# Patient Record
Sex: FEMALE | Race: AMERICAN INDIAN OR ALASKA NATIVE | NOT HISPANIC OR LATINO | URBAN - METROPOLITAN AREA
[De-identification: names, ages, dates, MRNs, and addresses within clinical notes are randomized per-mention and may not be internally consistent; named-entity substitution may affect disease eponyms.]

---

## 2018-10-17 ENCOUNTER — OUTPATIENT (OUTPATIENT)
Dept: OUTPATIENT SERVICES | Facility: HOSPITAL | Age: 63
LOS: 1 days | Discharge: HOME | End: 2018-10-17

## 2018-10-17 DIAGNOSIS — M25.521 PAIN IN RIGHT ELBOW: ICD-10-CM

## 2018-10-17 DIAGNOSIS — M25.562 PAIN IN LEFT KNEE: ICD-10-CM

## 2018-10-17 DIAGNOSIS — M25.561 PAIN IN RIGHT KNEE: ICD-10-CM

## 2018-10-17 DIAGNOSIS — M25.522 PAIN IN LEFT ELBOW: ICD-10-CM

## 2018-10-19 ENCOUNTER — TRANSCRIPTION ENCOUNTER (OUTPATIENT)
Age: 63
End: 2018-10-19

## 2019-06-27 ENCOUNTER — OUTPATIENT (OUTPATIENT)
Dept: OUTPATIENT SERVICES | Facility: HOSPITAL | Age: 64
LOS: 1 days | Discharge: HOME | End: 2019-06-27

## 2019-06-27 ENCOUNTER — APPOINTMENT (OUTPATIENT)
Age: 64
End: 2019-06-27
Payer: MEDICARE

## 2019-06-27 VITALS
BODY MASS INDEX: 35.08 KG/M2 | HEIGHT: 63 IN | WEIGHT: 198 LBS | HEART RATE: 88 BPM | DIASTOLIC BLOOD PRESSURE: 89 MMHG | SYSTOLIC BLOOD PRESSURE: 149 MMHG

## 2019-06-27 DIAGNOSIS — Z78.9 OTHER SPECIFIED HEALTH STATUS: ICD-10-CM

## 2019-06-27 DIAGNOSIS — Z86.79 PERSONAL HISTORY OF OTHER DISEASES OF THE CIRCULATORY SYSTEM: ICD-10-CM

## 2019-06-27 DIAGNOSIS — Z82.49 FAMILY HISTORY OF ISCHEMIC HEART DISEASE AND OTHER DISEASES OF THE CIRCULATORY SYSTEM: ICD-10-CM

## 2019-06-27 DIAGNOSIS — Z80.9 FAMILY HISTORY OF MALIGNANT NEOPLASM, UNSPECIFIED: ICD-10-CM

## 2019-06-27 DIAGNOSIS — Z85.850 PERSONAL HISTORY OF MALIGNANT NEOPLASM OF THYROID: ICD-10-CM

## 2019-06-27 DIAGNOSIS — M19.90 UNSPECIFIED OSTEOARTHRITIS, UNSPECIFIED SITE: ICD-10-CM

## 2019-06-27 DIAGNOSIS — Z87.09 PERSONAL HISTORY OF OTHER DISEASES OF THE RESPIRATORY SYSTEM: ICD-10-CM

## 2019-06-27 LAB
BILIRUB UR QL STRIP: NORMAL
CLARITY UR: CLEAR
COLLECTION METHOD: NORMAL
GLUCOSE UR-MCNC: NORMAL
HCG UR QL: NORMAL EU/DL
HGB UR QL STRIP.AUTO: NORMAL
KETONES UR-MCNC: NORMAL
LEUKOCYTE ESTERASE UR QL STRIP: 25
NITRITE UR QL STRIP: NORMAL
PH UR STRIP: 7
PROT UR STRIP-MCNC: NORMAL
SP GR UR STRIP: 1

## 2019-06-27 PROCEDURE — 81003 URINALYSIS AUTO W/O SCOPE: CPT | Mod: QW

## 2019-06-27 PROCEDURE — 99205 OFFICE O/P NEW HI 60 MIN: CPT

## 2019-06-27 PROCEDURE — 51701 INSERT BLADDER CATHETER: CPT

## 2019-06-27 RX ORDER — ASPIRIN ENTERIC COATED TABLETS 81 MG 81 MG/1
81 TABLET, DELAYED RELEASE ORAL
Refills: 0 | Status: ACTIVE | COMMUNITY

## 2019-06-27 RX ORDER — LEVOTHYROXINE SODIUM 137 UG/1
137 TABLET ORAL
Refills: 0 | Status: ACTIVE | COMMUNITY

## 2019-06-27 RX ORDER — ESTRADIOL 0.1 MG/G
0.1 CREAM VAGINAL
Qty: 1 | Refills: 3 | Status: ACTIVE | COMMUNITY
Start: 2019-06-27 | End: 1900-01-01

## 2019-06-27 RX ORDER — ALLOPURINOL 100 MG/1
100 TABLET ORAL
Refills: 0 | Status: ACTIVE | COMMUNITY

## 2019-06-27 RX ORDER — LOSARTAN POTASSIUM 50 MG/1
50 TABLET, FILM COATED ORAL
Refills: 0 | Status: ACTIVE | COMMUNITY

## 2019-06-27 NOTE — COUNSELING
[FreeTextEntry1] : \par We will notify you of the urine results if they are abnormal\par \par Please call the office if you feel like you have an infection so that we can arrange testing of your urine with a urine CULTURE. If you keep getting infections then I will recommend further evaluation of your kidneys and bladder\par \par Please decrease your water intake to MAX 4-5 bottles of water per day\par \par Decrease bladder irritants\par \par Apply a pea size amount of the cream to the opening of the vagina every night for two weeks followed by three nights per week\par \par Please start taking the trospium (bladder medication) twice a day for the urgency and incontinence\par \par Please call my office if you have any issues with the cost or side effects of the medication.\par \par Schedule 6 week med check with my PAJeannette (GERARDO/atrophy)\par \par Referral to pelvic floor PT. We will add you to the list for scheduling PT here at [facility]\par \par \par

## 2019-06-27 NOTE — DISCUSSION/SUMMARY
[FreeTextEntry1] : \par Mixed incontinence-\par The pathophysiology of the above condition was discussed with the patient. The patient was given information and education on pelvic floor muscle exercises/rehabilitation, avoidance of dietary bladder irritants, and other strategies to improve bladder control, such as scheduled voiding. She was counseled regarding further management strategies for overactive bladder and urge incontinence including pelvic floor physical therapy, medications or surgical management. The patient voiced understanding and agrees with diet changes, referral to PT and medical management. The risks and benefits of trospium was reviewed. We will follow up on her urine tests.\par \par The management options for stress incontinence were discussed including observation, pessary placement, pelvic floor physical therapy or surgery. The patient voiced understanding and agrees with a referral to PT.\par \par History of UTI-\par Advised the patient that recurrent UTIs are defined as having 3 or more positive urine culture in 1 year or 2 or more in 6 months, which she has not had. Advised to call the office if she feels like she has an infection so that we can arrange testing of her urine. If she keeps getting infections then I will recommend a workup of further evaluation of her kidneys and bladder and further prevention treatment including estrogen vaginal cream and daily antibiotic suppression. The patient voiced understanding and agrees with the plan.\par \par Atrophic vaginitis-\par We reviewed the risks, benefits, alternatives and indications of local estrogen therapy and I gave her a handout that covers this information. She does opt to begin this therapy. I have given her a prescription and specific instructions on how to use the estrogen cream, applied with a finger at a low dose for urogenital atrophy.\par \par \par

## 2019-06-27 NOTE — HISTORY OF PRESENT ILLNESS
[FreeTextEntry1] : \par Pt with pelvic floor dysfunction here for urogynecologic evaluation. She describes: \par Referring provider: Dr FRANCESCA Parra\par PCP: Dr Naveen Bagley\par \par Chief PFD: urinary incontinence\par \par RN\par \par UTI symptoms-not always related to sex\par hesitancy, dysuria, back pain, no hematuria\par takes azo and it mostly improves, if it does not improve her pcp empirically treats without sending a culture. last Abx treatment two weeks ago, around 4 treatments of abx in the last 12 months\par \par Pelvic organ prolapse: s/p tubal, s/p QUEENIE/US0 for fibroids, no bulge, denies splinting \par Stress urinary incontinence: Leakage with coughing, GERARDO: U>S\par Overactive bladder syndrome: voids [id number] secondary to urgency and incontinence, urge incontinence a couple of times per week, daily eneuresis, for one year, worsening, no prior treatment, no glaucoma, reports drinking 10-12 bottles of water per day (each 16 ounces)\par Voiding dysfunction: No incomplete bladder emptying, no hesitancy, no splinting \par Lower urinary tract/vaginal symptoms: as above UTIs per year, no hematuria, occasional dysuria relieved with azo, no bladder pain \par Fecal incontinence: denies \par Defecatory dysfunction: soft\par Sexual dysfunction: denies pain, has leakage with orgasm\par Pelvic pain: intermittent left sided, cramping & dull, typically 7/0 intensity, no pain currently, no aggravating factors, radiates to lower back, improves with resting, for around 1.5 years\par Vaginal dryness, uses Replens intermittently\par \par Her pelvic floor symptoms are significantly bothersome and negatively impacting her quality of life. \par \par

## 2019-06-28 DIAGNOSIS — N39.46 MIXED INCONTINENCE: ICD-10-CM

## 2019-07-01 LAB
APPEARANCE: CLEAR
BACTERIA UR CULT: NORMAL
BILIRUBIN URINE: NEGATIVE
BLOOD URINE: NEGATIVE
COLOR: YELLOW
GLUCOSE QUALITATIVE U: NEGATIVE MG/DL
KETONES URINE: NEGATIVE
LEUKOCYTE ESTERASE URINE: NEGATIVE
NITRITE URINE: NEGATIVE
PH URINE: 7
PROTEIN URINE: NEGATIVE MG/DL
SPECIFIC GRAVITY URINE: <=1.005
UROBILINOGEN URINE: 0.2 MG/DL (ref 0.2–?)

## 2019-08-12 ENCOUNTER — APPOINTMENT (OUTPATIENT)
Dept: UROGYNECOLOGY | Facility: CLINIC | Age: 64
End: 2019-08-12

## 2019-09-06 ENCOUNTER — RX RENEWAL (OUTPATIENT)
Age: 64
End: 2019-09-06

## 2019-09-06 RX ORDER — TROSPIUM CHLORIDE 20 MG/1
20 TABLET, FILM COATED ORAL
Qty: 60 | Refills: 1 | Status: DISCONTINUED | COMMUNITY
Start: 2019-06-27 | End: 2019-09-06

## 2019-09-12 RX ORDER — NITROFURANTOIN (MONOHYDRATE/MACROCRYSTALS) 25; 75 MG/1; MG/1
100 CAPSULE ORAL TWICE DAILY
Qty: 14 | Refills: 0 | Status: COMPLETED | COMMUNITY
Start: 2019-09-12 | End: 2019-09-19

## 2020-01-01 ENCOUNTER — APPOINTMENT (OUTPATIENT)
Dept: GASTROENTEROLOGY | Facility: CLINIC | Age: 65
End: 2020-01-01
Payer: COMMERCIAL

## 2020-01-01 ENCOUNTER — OUTPATIENT (OUTPATIENT)
Dept: OUTPATIENT SERVICES | Facility: HOSPITAL | Age: 65
LOS: 1 days | Discharge: HOME | End: 2020-01-01
Payer: COMMERCIAL

## 2020-01-01 VITALS — WEIGHT: 194 LBS | BODY MASS INDEX: 34.38 KG/M2 | TEMPERATURE: 98 F | HEIGHT: 63 IN

## 2020-01-01 DIAGNOSIS — Z87.09 PERSONAL HISTORY OF OTHER DISEASES OF THE RESPIRATORY SYSTEM: ICD-10-CM

## 2020-01-01 DIAGNOSIS — R07.9 CHEST PAIN, UNSPECIFIED: ICD-10-CM

## 2020-01-01 DIAGNOSIS — R73.03 PREDIABETES.: ICD-10-CM

## 2020-01-01 DIAGNOSIS — Z85.850 PERSONAL HISTORY OF MALIGNANT NEOPLASM OF THYROID: ICD-10-CM

## 2020-01-01 PROCEDURE — 75574 CT ANGIO HRT W/3D IMAGE: CPT | Mod: 26

## 2020-01-01 PROCEDURE — 99203 OFFICE O/P NEW LOW 30 MIN: CPT

## 2020-01-01 RX ORDER — ASCORBIC ACID 500 MG
500 TABLET ORAL
Refills: 0 | Status: ACTIVE | COMMUNITY

## 2020-01-01 RX ORDER — MONTELUKAST SODIUM 10 MG/1
10 TABLET, FILM COATED ORAL
Refills: 0 | Status: ACTIVE | COMMUNITY

## 2020-01-01 RX ORDER — PANTOPRAZOLE SODIUM 20 MG/1
20 TABLET, DELAYED RELEASE ORAL
Refills: 0 | Status: ACTIVE | COMMUNITY

## 2020-01-01 RX ORDER — AMLODIPINE BESYLATE 5 MG/1
5 TABLET ORAL
Refills: 0 | Status: ACTIVE | COMMUNITY

## 2020-08-21 ENCOUNTER — TRANSCRIPTION ENCOUNTER (OUTPATIENT)
Age: 65
End: 2020-08-21

## 2020-08-21 ENCOUNTER — EMERGENCY (EMERGENCY)
Facility: HOSPITAL | Age: 65
LOS: 0 days | Discharge: HOME | End: 2020-08-21
Attending: STUDENT IN AN ORGANIZED HEALTH CARE EDUCATION/TRAINING PROGRAM | Admitting: INTERNAL MEDICINE
Payer: COMMERCIAL

## 2020-08-21 VITALS
DIASTOLIC BLOOD PRESSURE: 73 MMHG | SYSTOLIC BLOOD PRESSURE: 156 MMHG | HEART RATE: 100 BPM | RESPIRATION RATE: 18 BRPM | TEMPERATURE: 98 F | OXYGEN SATURATION: 99 %

## 2020-08-21 VITALS
RESPIRATION RATE: 18 BRPM | HEART RATE: 78 BPM | DIASTOLIC BLOOD PRESSURE: 77 MMHG | TEMPERATURE: 97 F | OXYGEN SATURATION: 99 % | SYSTOLIC BLOOD PRESSURE: 148 MMHG

## 2020-08-21 DIAGNOSIS — Z90.09 ACQUIRED ABSENCE OF OTHER PART OF HEAD AND NECK: ICD-10-CM

## 2020-08-21 DIAGNOSIS — J45.909 UNSPECIFIED ASTHMA, UNCOMPLICATED: ICD-10-CM

## 2020-08-21 DIAGNOSIS — I10 ESSENTIAL (PRIMARY) HYPERTENSION: ICD-10-CM

## 2020-08-21 DIAGNOSIS — E11.9 TYPE 2 DIABETES MELLITUS WITHOUT COMPLICATIONS: ICD-10-CM

## 2020-08-21 DIAGNOSIS — N39.0 URINARY TRACT INFECTION, SITE NOT SPECIFIED: ICD-10-CM

## 2020-08-21 DIAGNOSIS — Z90.710 ACQUIRED ABSENCE OF BOTH CERVIX AND UTERUS: ICD-10-CM

## 2020-08-21 DIAGNOSIS — R10.31 RIGHT LOWER QUADRANT PAIN: ICD-10-CM

## 2020-08-21 DIAGNOSIS — B37.9 CANDIDIASIS, UNSPECIFIED: ICD-10-CM

## 2020-08-21 LAB
ALBUMIN SERPL ELPH-MCNC: 4.5 G/DL — SIGNIFICANT CHANGE UP (ref 3.5–5.2)
ALP SERPL-CCNC: 76 U/L — SIGNIFICANT CHANGE UP (ref 30–115)
ALT FLD-CCNC: 21 U/L — SIGNIFICANT CHANGE UP (ref 0–41)
ANION GAP SERPL CALC-SCNC: 11 MMOL/L — SIGNIFICANT CHANGE UP (ref 7–14)
APPEARANCE UR: ABNORMAL
AST SERPL-CCNC: 28 U/L — SIGNIFICANT CHANGE UP (ref 0–41)
BACTERIA # UR AUTO: NEGATIVE — SIGNIFICANT CHANGE UP
BASOPHILS # BLD AUTO: 0.04 K/UL — SIGNIFICANT CHANGE UP (ref 0–0.2)
BASOPHILS NFR BLD AUTO: 0.6 % — SIGNIFICANT CHANGE UP (ref 0–1)
BILIRUB DIRECT SERPL-MCNC: <0.2 MG/DL — SIGNIFICANT CHANGE UP (ref 0–0.2)
BILIRUB INDIRECT FLD-MCNC: >0.3 MG/DL — SIGNIFICANT CHANGE UP (ref 0.2–1.2)
BILIRUB SERPL-MCNC: 0.5 MG/DL — SIGNIFICANT CHANGE UP (ref 0.2–1.2)
BILIRUB UR-MCNC: NEGATIVE — SIGNIFICANT CHANGE UP
BUN SERPL-MCNC: 10 MG/DL — SIGNIFICANT CHANGE UP (ref 10–20)
CALCIUM SERPL-MCNC: 9.2 MG/DL — SIGNIFICANT CHANGE UP (ref 8.5–10.1)
CHLORIDE SERPL-SCNC: 105 MMOL/L — SIGNIFICANT CHANGE UP (ref 98–110)
CO2 SERPL-SCNC: 23 MMOL/L — SIGNIFICANT CHANGE UP (ref 17–32)
COLOR SPEC: YELLOW — SIGNIFICANT CHANGE UP
CREAT SERPL-MCNC: 0.9 MG/DL — SIGNIFICANT CHANGE UP (ref 0.7–1.5)
DIFF PNL FLD: SIGNIFICANT CHANGE UP
EOSINOPHIL # BLD AUTO: 0.08 K/UL — SIGNIFICANT CHANGE UP (ref 0–0.7)
EOSINOPHIL NFR BLD AUTO: 1.2 % — SIGNIFICANT CHANGE UP (ref 0–8)
EPI CELLS # UR: >27 /HPF — HIGH (ref 0–5)
GLUCOSE SERPL-MCNC: 102 MG/DL — HIGH (ref 70–99)
GLUCOSE UR QL: NEGATIVE — SIGNIFICANT CHANGE UP
HCT VFR BLD CALC: 37.2 % — SIGNIFICANT CHANGE UP (ref 37–47)
HGB BLD-MCNC: 12 G/DL — SIGNIFICANT CHANGE UP (ref 12–16)
HYALINE CASTS # UR AUTO: 2 /LPF — SIGNIFICANT CHANGE UP (ref 0–7)
IMM GRANULOCYTES NFR BLD AUTO: 0.3 % — SIGNIFICANT CHANGE UP (ref 0.1–0.3)
KETONES UR-MCNC: NEGATIVE — SIGNIFICANT CHANGE UP
LACTATE SERPL-SCNC: 1.9 MMOL/L — SIGNIFICANT CHANGE UP (ref 0.7–2)
LEUKOCYTE ESTERASE UR-ACNC: ABNORMAL
LIDOCAIN IGE QN: 23 U/L — SIGNIFICANT CHANGE UP (ref 7–60)
LYMPHOCYTES # BLD AUTO: 1.93 K/UL — SIGNIFICANT CHANGE UP (ref 1.2–3.4)
LYMPHOCYTES # BLD AUTO: 29 % — SIGNIFICANT CHANGE UP (ref 20.5–51.1)
MCHC RBC-ENTMCNC: 30.4 PG — SIGNIFICANT CHANGE UP (ref 27–31)
MCHC RBC-ENTMCNC: 32.3 G/DL — SIGNIFICANT CHANGE UP (ref 32–37)
MCV RBC AUTO: 94.2 FL — SIGNIFICANT CHANGE UP (ref 81–99)
MONOCYTES # BLD AUTO: 0.63 K/UL — HIGH (ref 0.1–0.6)
MONOCYTES NFR BLD AUTO: 9.5 % — HIGH (ref 1.7–9.3)
NEUTROPHILS # BLD AUTO: 3.95 K/UL — SIGNIFICANT CHANGE UP (ref 1.4–6.5)
NEUTROPHILS NFR BLD AUTO: 59.4 % — SIGNIFICANT CHANGE UP (ref 42.2–75.2)
NITRITE UR-MCNC: NEGATIVE — SIGNIFICANT CHANGE UP
NRBC # BLD: 0 /100 WBCS — SIGNIFICANT CHANGE UP (ref 0–0)
PH UR: 7 — SIGNIFICANT CHANGE UP (ref 5–8)
PLATELET # BLD AUTO: 587 K/UL — HIGH (ref 130–400)
POTASSIUM SERPL-MCNC: 4.9 MMOL/L — SIGNIFICANT CHANGE UP (ref 3.5–5)
POTASSIUM SERPL-SCNC: 4.9 MMOL/L — SIGNIFICANT CHANGE UP (ref 3.5–5)
PROT SERPL-MCNC: 7.3 G/DL — SIGNIFICANT CHANGE UP (ref 6–8)
PROT UR-MCNC: SIGNIFICANT CHANGE UP
RBC # BLD: 3.95 M/UL — LOW (ref 4.2–5.4)
RBC # FLD: 13.2 % — SIGNIFICANT CHANGE UP (ref 11.5–14.5)
RBC CASTS # UR COMP ASSIST: 2 /HPF — SIGNIFICANT CHANGE UP (ref 0–4)
SODIUM SERPL-SCNC: 139 MMOL/L — SIGNIFICANT CHANGE UP (ref 135–146)
SP GR SPEC: 1.01 — SIGNIFICANT CHANGE UP (ref 1.01–1.02)
UROBILINOGEN FLD QL: SIGNIFICANT CHANGE UP
WBC # BLD: 6.65 K/UL — SIGNIFICANT CHANGE UP (ref 4.8–10.8)
WBC # FLD AUTO: 6.65 K/UL — SIGNIFICANT CHANGE UP (ref 4.8–10.8)
WBC UR QL: 14 /HPF — HIGH (ref 0–5)

## 2020-08-21 PROCEDURE — 99285 EMERGENCY DEPT VISIT HI MDM: CPT

## 2020-08-21 PROCEDURE — 74177 CT ABD & PELVIS W/CONTRAST: CPT | Mod: 26

## 2020-08-21 RX ORDER — CEFPODOXIME PROXETIL 100 MG
1 TABLET ORAL
Qty: 20 | Refills: 0
Start: 2020-08-21 | End: 2020-01-01

## 2020-08-21 RX ORDER — ONDANSETRON 8 MG/1
4 TABLET, FILM COATED ORAL ONCE
Refills: 0 | Status: COMPLETED | OUTPATIENT
Start: 2020-08-21 | End: 2020-08-21

## 2020-08-21 RX ORDER — SODIUM CHLORIDE 9 MG/ML
1000 INJECTION, SOLUTION INTRAVENOUS ONCE
Refills: 0 | Status: COMPLETED | OUTPATIENT
Start: 2020-08-21 | End: 2020-08-21

## 2020-08-21 RX ORDER — FLUCONAZOLE 150 MG/1
150 TABLET ORAL ONCE
Refills: 0 | Status: COMPLETED | OUTPATIENT
Start: 2020-08-21 | End: 2020-08-21

## 2020-08-21 RX ORDER — ACETAMINOPHEN 500 MG
650 TABLET ORAL ONCE
Refills: 0 | Status: COMPLETED | OUTPATIENT
Start: 2020-08-21 | End: 2020-08-21

## 2020-08-21 RX ADMIN — FLUCONAZOLE 150 MILLIGRAM(S): 150 TABLET ORAL at 14:05

## 2020-08-21 RX ADMIN — SODIUM CHLORIDE 1000 MILLILITER(S): 9 INJECTION, SOLUTION INTRAVENOUS at 12:35

## 2020-08-21 RX ADMIN — Medication 650 MILLIGRAM(S): at 12:34

## 2020-08-21 RX ADMIN — ONDANSETRON 4 MILLIGRAM(S): 8 TABLET, FILM COATED ORAL at 12:35

## 2020-08-21 NOTE — ED PROVIDER NOTE - PHYSICAL EXAMINATION
CONSTITUTIONAL: Well-developed; well-nourished; in no acute distress.   SKIN: warm, dry  HEAD: Normocephalic; atraumatic.  EYES: no conjunctival injection.  EOMI.   ENT: No nasal discharge; airway clear.  NECK: Supple; non tender.  CARD: S1, S2 normal; no murmurs, gallops, or rubs. Regular rate and rhythm.   RESP: No wheezes, rales or rhonchi.  ABD: soft nondistended. rlq ttp. +suprapubic TTP. no CVA tenderness.   EXT: Normal ROM.  No clubbing, cyanosis or edema.   LYMPH: No acute cervical adenopathy.  NEURO: Alert, oriented, grossly unremarkable.  PSYCH: Cooperative, appropriate.

## 2020-08-21 NOTE — ED PROVIDER NOTE - NSFOLLOWUPINSTRUCTIONS_ED_ALL_ED_FT
Urinary Tract Infection    A urinary tract infection (UTI) is an infection of any part of the urinary tract, which includes the kidneys, ureters, bladder, and urethra. Risk factors include ignoring your need to urinate, wiping back to front if female, being an uncircumcised male, and having diabetes or a weak immune system. Symptoms include frequent urination, pain or burning with urination, foul smelling urine, cloudy urine, pain in the lower abdomen, blood in the urine, and fever. If you were prescribed an antibiotic medicine, take it as told by your health care provider. Do not stop taking the antibiotic even if you start to feel better.    SEEK IMMEDIATE MEDICAL CARE IF YOU HAVE THE FOLLOWING SYMPTOMS: severe back or abdominal pain, inability to keep fluids or medicine down, dizziness/lightheadedness, or a change in mental status.      Vaginal Yeast infection, Adult    Vaginal yeast infection is a condition that causes soreness, swelling, and redness (inflammation) of the vagina. It also causes vaginal discharge. This is a common condition. Some women get this infection frequently.     CAUSES  This condition is caused by a change in the normal balance of the yeast (candida) and bacteria that live in the vagina. This change causes an overgrowth of yeast, which causes the inflammation.    RISK FACTORS  This condition is more likely to develop in:    Women who take antibiotic medicines.  Women who have diabetes.  Women who take birth control pills.  Women who are pregnant.  Women who douche often.  Women who have a weak defense (immune) system.  Women who have been taking steroid medicines for a long time.  Women who frequently wear tight clothing.    SYMPTOMS  Symptoms of this condition include:    White, thick vaginal discharge.  Swelling, itching, redness, and irritation of the vagina. The lips of the vagina (vulva) may be affected as well.  Pain or a burning feeling while urinating.  Pain during sex.    DIAGNOSIS  This condition is diagnosed with a medical history and physical exam. This will include a pelvic exam. Your health care provider will examine a sample of your vaginal discharge under a microscope. Your health care provider may send this sample for testing to confirm the diagnosis.    TREATMENT  This condition is treated with medicine. Medicines may be over-the-counter or prescription. You may be told to use one or more of the following:    Medicine that is taken orally.  Medicine that is applied as a cream.  Medicine that is inserted directly into the vagina (suppository).    HOME CARE INSTRUCTIONS  Take or apply over-the-counter and prescription medicines only as told by your health care provider.  Do not have sex until your health care provider has approved. Tell your sex partner that you have a yeast infection. That person should go to his or her health care provider if he or she develops symptoms.  Do not wear tight clothes, such as pantyhose or tight pants.  Avoid using tampons until your health care provider approves.  Eat more yogurt. This may help to keep your yeast infection from returning.  Try taking a sitz bath to help with discomfort. This is a warm water bath that is taken while you are sitting down. The water should only come up to your hips and should cover your buttocks. Do this 3–4 times per day or as told by your health care provider.  Do not douche.  Wear breathable, cotton underwear.  If you have diabetes, keep your blood sugar levels under control.    SEEK MEDICAL CARE IF:  You have a fever.  Your symptoms go away and then return.  Your symptoms do not get better with treatment.  Your symptoms get worse.  You have new symptoms.  You develop blisters in or around your vagina.  You have blood coming from your vagina and it is not your menstrual period.  You develop pain in your abdomen.    ADDITIONAL NOTES AND INSTRUCTIONS    Please follow up with your Primary MD in 24-48 hr.  Seek immediate medical care for any new/worsening signs or symptoms.

## 2020-08-21 NOTE — ED PROVIDER NOTE - CARE PROVIDER_API CALL
Zurdo Caceres  GASTROENTEROLOGY  [address] [place] [address]  Phone: [phone]  Fax: [phone]  Follow Up Time:     Naveen Bagley  INTERNAL MEDICINE  [address] [place] [address]  Phone: [phone]  Fax: [phone]  Follow Up Time:

## 2020-08-21 NOTE — ED ADULT NURSE NOTE - OBJECTIVE STATEMENT
Patient presents to ED with c/o RLQ abdominal pain and nausea for 3 days, worsening today. Patient denies vomiting, denies diarrhea.

## 2020-08-21 NOTE — ED PROVIDER NOTE - ATTENDING CONTRIBUTION TO CARE
65 yr old f w/ a pmh significant for  HTN, asthma, DM, thyroid CA s/p thyroidectomy, partial hysterectomy presents to ED with RLQ pain x3 days radiating to her back. Pt denies any nausea, vomiting, chest pain, fevers, chills back pain or any other complaints. Of note, pt does complain of vaginal itchiness.     Review of Systems    Constitutional: (-) fever  Cardiovascular: (-) chest pain, (-) syncope  Respiratory: (-) cough, (-) shortness of breath  Gastrointestinal: (-) vomiting, (-) diarrhea, (+) abdominal pain  Musculoskeletal: (-) neck pain, (-) back pain, (-) joint pain  Integumentary: (-) rash, (-) edema  Neurological: (-) headache, (-) altered mental status    Except as documented in the HPI, all other systems are negative.    VITAL SIGNS: I have reviewed nursing notes and confirm.  CONSTITUTIONAL: non-toxic, well appearing  SKIN: no rash, no petechiae.  EYES: PERRL, EOMI, pink conjunctiva, anicteric  ENT: tongue midline, no exudates, MMM  NECK: Supple; no meningismus, no JVD  CARD: RRR, no murmurs, equal radial pulses bilaterally 2+  RESP: CTAB, no respiratory distress  ABD: Soft, non-tender, non-distended, no peritoneal signs, no HSM, no CVA tenderness  : GYN Exam:      Ext vagina: normal. No erythema, swelling, or lesions.      Speculum exam: Vaginal wall normal in appearance without erythema, masses, or lesions. Cervix normal in appearance without erythema or masses. The os is closed. There is scant discharge. The discharge is thick and white.      Bimanual: No CMT, right adnexal tenderness, or left adnexal tenderness.      Chaperoned by: Dr. Rizvi    a/p  65 yr old f that presents with lower abdominal pain, concerning for UTI vs appy  -on exam, pt has evidence of yeast infection, will treat with fluconazole  -labs  -ct a/p  -ua/culture  -dispo as per above

## 2020-08-21 NOTE — ED PROVIDER NOTE - OBJECTIVE STATEMENT
64 yo F 66 yo F PMHx 66 yo F PMHx HTN, asthma, DM, thyroid CA s/p thyroidectomy, partial hysterectomy presents to ED with RLQ pain x3 days radiating to her back. Denies vomiting, fevers, diarrhea. Pain has been gradual and worsening. Denies vaginal bleeding, dysuria but reports urinary frequency.

## 2020-08-21 NOTE — ED PROVIDER NOTE - PATIENT PORTAL LINK FT
You can access the FollowMyHealth Patient Portal offered by [facility] by registering at the following website: [email or web address]. By joining [employer]’s FollowMyHealth portal, you will also be able to view your health information using other applications (apps) compatible with our system.

## 2020-08-21 NOTE — ED PROVIDER NOTE - PROGRESS NOTE DETAILS
GONZÁLEZ Rizvi: Pelvic exam performed. Normal appearing female genitalia, no masses/lesions/erythema. +thick white discharge consistent with yeast infection. Os closed. No active bleeding. No CMT or adnexal tenderness. David: pt signed out to Dr. Knapp SR: s/o received from dr. ivy will f.u ct and reassess

## 2020-08-21 NOTE — ED PROVIDER NOTE - CARE PLAN
Principal Discharge DX:	Abdominal pain  Secondary Diagnosis:	UTI (urinary tract infection)  Secondary Diagnosis:	Yeast infection

## 2020-08-21 NOTE — ED PROVIDER NOTE - NS ED ROS FT
Review of Systems:  CONSTITUTIONAL: No fever, No diaphoresis, No weight change  SKIN: No rash  HEMATOLOGIC: No abnormal bleeding or bruising  EYES: No eye pain, No blurred vision  ENT: No change in hearing, No sore throat, No neck pain, No rhinorrhea, No ear pain  RESPIRATORY: No shortness of breath, No cough  CARDIAC: No chest pain, No palpitations  GI: +abdominal pain, No nausea, No vomiting, No diarrhea, No constipation, No bright red blood per rectum or melena. No flank pain.   : No dysuria, hematuria. +frequency   MUSCULOSKELETAL: No joint paint, No swelling, No back pain  NEUROLOGIC: No numbness, No focal weakness, No headache, No dizziness  All other systems negative, unless specified in HPI

## 2020-08-21 NOTE — ED PROVIDER NOTE - CLINICAL SUMMARY MEDICAL DECISION MAKING FREE TEXT BOX
65 year old f w/ a pmh significant for  HTN, asthma, DM, thyroid CA s/p thyroidectomy, partial hysterectomy presents to ED with RLQ pain x3 days . VS reviewed. Labs imaging obtained and reviewed. Patient found to have findings consistent with yeast infection on pelvic exam and treated with fluconazole. Urinalysis positive for UTI , antibiotics sent to pharmacy. CT demonstrated "Ill-defined 1.5 cm focus of hypodensity in the left hepatic lobe is indeterminate. Another ill-defined a 3.8 cm region of hyperdensity adjacent to the gallbladder in the left hepatic lobe. Gadolinium-enhanced MRI of the liver is recommended for further evaluation as an outpatient." Discussed with patient to follow up with PMD for further evaluation for MRI. Patient a spoken to in detail about results  All questions addressed.  Results of ED work up discussed and patient given a copy of the results. Patient has proper follow up. Return precautions given.

## 2020-08-22 LAB
CULTURE RESULTS: SIGNIFICANT CHANGE UP
SPECIMEN SOURCE: SIGNIFICANT CHANGE UP

## 2020-09-30 PROBLEM — Z85.850 HISTORY OF MALIGNANT NEOPLASM OF THYROID: Status: RESOLVED | Noted: 2020-01-01 | Resolved: 2020-01-01

## 2020-09-30 PROBLEM — Z87.09 HISTORY OF SINUSITIS: Status: RESOLVED | Noted: 2020-01-01 | Resolved: 2020-01-01

## 2020-09-30 PROBLEM — R73.03 PRE-DIABETES: Status: RESOLVED | Noted: 2020-01-01 | Resolved: 2020-01-01

## 2020-09-30 NOTE — ASSESSMENT
[FreeTextEntry1] : Patient is a 64 y/o with PMHX of HTN, pre-DM, whom was referred by ED for abdominal pain that occurred in August. Pain was located in right lower quadrant with radiation to back. Pain since resolved. PAtient without fever, chills, additional pain, or change in stool. She is up to date with CRC and follows GI  whom did last Colonoscopy 3 years ago. CT scan done in ED notable for 1.5cm and 3.8cm Liver lesion. Lab work was unrevealing. will obtain MRI with Brodie.\par \par Liver lesions 1.5cm and 3.8cm\par - MRI of abdomen with contrast \par \par Patient will call us back if she request EGD and Colonoscopy

## 2020-09-30 NOTE — HISTORY OF PRESENT ILLNESS
[de-identified] : Patient is a 66 y/o with PMHX of HTN, pre-DM, whom was referred by ED for abdominal pain that occurred in August. Pain was located in right lower quadrant with radiation to back. Pain since resolved. PAtient without fever, chills, additional pain, or change in stool. She is up to date with CRC and follows GI  whom did last Colonoscopy 3 years ago.

## 2020-09-30 NOTE — PHYSICAL EXAM
[General Appearance - Alert] : alert [General Appearance - In No Acute Distress] : in no acute distress [Sclera] : the sclera and conjunctiva were normal [Outer Ear] : the ears and nose were normal in appearance [Neck Appearance] : the appearance of the neck was normal [] : no respiratory distress [Heart Sounds] : normal S1 and S2 [Abdomen Soft] : soft [Abdomen Tenderness] : non-tender [Abnormal Walk] : normal gait [Skin Color & Pigmentation] : normal skin color and pigmentation [No Focal Deficits] : no focal deficits [Oriented To Time, Place, And Person] : oriented to person, place, and time [Affect] : the affect was normal

## 2021-01-01 ENCOUNTER — RESULT REVIEW (OUTPATIENT)
Age: 66
End: 2021-01-01

## 2021-01-01 ENCOUNTER — APPOINTMENT (OUTPATIENT)
Dept: GASTROENTEROLOGY | Facility: CLINIC | Age: 66
End: 2021-01-01
Payer: COMMERCIAL

## 2021-01-01 ENCOUNTER — TRANSCRIPTION ENCOUNTER (OUTPATIENT)
Age: 66
End: 2021-01-01

## 2021-01-01 ENCOUNTER — OUTPATIENT (OUTPATIENT)
Dept: OUTPATIENT SERVICES | Facility: HOSPITAL | Age: 66
LOS: 1 days | Discharge: HOME | End: 2021-01-01
Payer: COMMERCIAL

## 2021-01-01 ENCOUNTER — OUTPATIENT (OUTPATIENT)
Dept: OUTPATIENT SERVICES | Facility: HOSPITAL | Age: 66
LOS: 1 days | Discharge: HOME | End: 2021-01-01

## 2021-01-01 ENCOUNTER — INPATIENT (INPATIENT)
Facility: HOSPITAL | Age: 66
LOS: 15 days | End: 2021-08-23
Attending: INTERNAL MEDICINE | Admitting: INTERNAL MEDICINE
Payer: SELF-PAY

## 2021-01-01 ENCOUNTER — LABORATORY RESULT (OUTPATIENT)
Age: 66
End: 2021-01-01

## 2021-01-01 VITALS
WEIGHT: 195.99 LBS | OXYGEN SATURATION: 100 % | HEIGHT: 63 IN | DIASTOLIC BLOOD PRESSURE: 80 MMHG | RESPIRATION RATE: 16 BRPM | SYSTOLIC BLOOD PRESSURE: 155 MMHG | TEMPERATURE: 97 F | HEART RATE: 102 BPM

## 2021-01-01 VITALS
OXYGEN SATURATION: 85 % | RESPIRATION RATE: 22 BRPM | TEMPERATURE: 102 F | HEIGHT: 63 IN | SYSTOLIC BLOOD PRESSURE: 138 MMHG | DIASTOLIC BLOOD PRESSURE: 61 MMHG | HEART RATE: 105 BPM | WEIGHT: 195.99 LBS

## 2021-01-01 VITALS — RESPIRATION RATE: 18 BRPM | DIASTOLIC BLOOD PRESSURE: 76 MMHG | HEART RATE: 86 BPM | SYSTOLIC BLOOD PRESSURE: 149 MMHG

## 2021-01-01 VITALS — HEART RATE: 60 BPM

## 2021-01-01 DIAGNOSIS — K29.80 DUODENITIS WITHOUT BLEEDING: ICD-10-CM

## 2021-01-01 DIAGNOSIS — K62.1 RECTAL POLYP: ICD-10-CM

## 2021-01-01 DIAGNOSIS — N95.2 POSTMENOPAUSAL ATROPHIC VAGINITIS: ICD-10-CM

## 2021-01-01 DIAGNOSIS — Z00.00 ENCOUNTER FOR GENERAL ADULT MEDICAL EXAMINATION W/OUT ABNORMAL FINDINGS: ICD-10-CM

## 2021-01-01 DIAGNOSIS — K76.9 LIVER DISEASE, UNSPECIFIED: ICD-10-CM

## 2021-01-01 DIAGNOSIS — K25.4 CHRONIC OR UNSPECIFIED GASTRIC ULCER WITH HEMORRHAGE: ICD-10-CM

## 2021-01-01 DIAGNOSIS — K25.9 GASTRIC ULCER, UNSPECIFIED AS ACUTE OR CHRONIC, W/OUT HEMORRHAGE OR PERFORATION: ICD-10-CM

## 2021-01-01 DIAGNOSIS — Z90.710 ACQUIRED ABSENCE OF BOTH CERVIX AND UTERUS: Chronic | ICD-10-CM

## 2021-01-01 DIAGNOSIS — K63.5 POLYP OF COLON: ICD-10-CM

## 2021-01-01 DIAGNOSIS — R74.8 ABNORMAL LEVELS OF OTHER SERUM ENZYMES: ICD-10-CM

## 2021-01-01 DIAGNOSIS — K62.89 OTHER SPECIFIED DISEASES OF ANUS AND RECTUM: ICD-10-CM

## 2021-01-01 DIAGNOSIS — K57.30 DIVERTICULOSIS OF LARGE INTESTINE W/OUT PERFORATION OR ABSCESS W/OUT BLEEDING: ICD-10-CM

## 2021-01-01 DIAGNOSIS — R12 HEARTBURN: ICD-10-CM

## 2021-01-01 DIAGNOSIS — E89.0 POSTPROCEDURAL HYPOTHYROIDISM: Chronic | ICD-10-CM

## 2021-01-01 DIAGNOSIS — K57.30 DIVERTICULOSIS OF LARGE INTESTINE WITHOUT PERFORATION OR ABSCESS WITHOUT BLEEDING: ICD-10-CM

## 2021-01-01 DIAGNOSIS — Z12.11 ENCOUNTER FOR SCREENING FOR MALIGNANT NEOPLASM OF COLON: ICD-10-CM

## 2021-01-01 DIAGNOSIS — K20.90 ESOPHAGITIS, UNSPECIFIED WITHOUT BLEEDING: ICD-10-CM

## 2021-01-01 DIAGNOSIS — N39.46 MIXED INCONTINENCE: ICD-10-CM

## 2021-01-01 DIAGNOSIS — K29.50 UNSPECIFIED CHRONIC GASTRITIS WITHOUT BLEEDING: ICD-10-CM

## 2021-01-01 DIAGNOSIS — K20.0 EOSINOPHILIC ESOPHAGITIS: ICD-10-CM

## 2021-01-01 DIAGNOSIS — K64.8 OTHER HEMORRHOIDS: ICD-10-CM

## 2021-01-01 DIAGNOSIS — R10.9 UNSPECIFIED ABDOMINAL PAIN: ICD-10-CM

## 2021-01-01 DIAGNOSIS — Z11.59 ENCOUNTER FOR SCREENING FOR OTHER VIRAL DISEASES: ICD-10-CM

## 2021-01-01 DIAGNOSIS — K52.9 NONINFECTIVE GASTROENTERITIS AND COLITIS, UNSPECIFIED: ICD-10-CM

## 2021-01-01 DIAGNOSIS — Z87.440 PERSONAL HISTORY OF URINARY (TRACT) INFECTIONS: ICD-10-CM

## 2021-01-01 LAB
A1C WITH ESTIMATED AVERAGE GLUCOSE RESULT: 7 % — HIGH (ref 4–5.6)
ALBUMIN SERPL ELPH-MCNC: 3.1 G/DL — LOW (ref 3.5–5.2)
ALBUMIN SERPL ELPH-MCNC: 3.2 G/DL — LOW (ref 3.5–5.2)
ALBUMIN SERPL ELPH-MCNC: 3.3 G/DL — LOW (ref 3.5–5.2)
ALBUMIN SERPL ELPH-MCNC: 3.4 G/DL — LOW (ref 3.5–5.2)
ALBUMIN SERPL ELPH-MCNC: 3.5 G/DL — SIGNIFICANT CHANGE UP (ref 3.5–5.2)
ALBUMIN SERPL ELPH-MCNC: 3.6 G/DL — SIGNIFICANT CHANGE UP (ref 3.5–5.2)
ALBUMIN SERPL ELPH-MCNC: 3.6 G/DL — SIGNIFICANT CHANGE UP (ref 3.5–5.2)
ALBUMIN SERPL ELPH-MCNC: 3.7 G/DL — SIGNIFICANT CHANGE UP (ref 3.5–5.2)
ALP SERPL-CCNC: 63 U/L — SIGNIFICANT CHANGE UP (ref 30–115)
ALP SERPL-CCNC: 64 U/L — SIGNIFICANT CHANGE UP (ref 30–115)
ALP SERPL-CCNC: 74 U/L — SIGNIFICANT CHANGE UP (ref 30–115)
ALP SERPL-CCNC: 80 U/L — SIGNIFICANT CHANGE UP (ref 30–115)
ALP SERPL-CCNC: 82 U/L — SIGNIFICANT CHANGE UP (ref 30–115)
ALP SERPL-CCNC: 83 U/L — SIGNIFICANT CHANGE UP (ref 30–115)
ALP SERPL-CCNC: 86 U/L — SIGNIFICANT CHANGE UP (ref 30–115)
ALP SERPL-CCNC: 87 U/L — SIGNIFICANT CHANGE UP (ref 30–115)
ALP SERPL-CCNC: 88 U/L — SIGNIFICANT CHANGE UP (ref 30–115)
ALP SERPL-CCNC: 91 U/L — SIGNIFICANT CHANGE UP (ref 30–115)
ALP SERPL-CCNC: 96 U/L — SIGNIFICANT CHANGE UP (ref 30–115)
ALP SERPL-CCNC: 97 U/L — SIGNIFICANT CHANGE UP (ref 30–115)
ALT FLD-CCNC: 26 U/L — SIGNIFICANT CHANGE UP (ref 0–41)
ALT FLD-CCNC: 27 U/L — SIGNIFICANT CHANGE UP (ref 0–41)
ALT FLD-CCNC: 30 U/L — SIGNIFICANT CHANGE UP (ref 0–41)
ALT FLD-CCNC: 32 U/L — SIGNIFICANT CHANGE UP (ref 0–41)
ALT FLD-CCNC: 33 U/L — SIGNIFICANT CHANGE UP (ref 0–41)
ALT FLD-CCNC: 37 U/L — SIGNIFICANT CHANGE UP (ref 0–41)
ALT FLD-CCNC: 38 U/L — SIGNIFICANT CHANGE UP (ref 0–41)
ALT FLD-CCNC: 38 U/L — SIGNIFICANT CHANGE UP (ref 0–41)
ALT FLD-CCNC: 40 U/L — SIGNIFICANT CHANGE UP (ref 0–41)
ALT FLD-CCNC: 42 U/L — HIGH (ref 0–41)
ALT FLD-CCNC: 43 U/L — HIGH (ref 0–41)
ALT FLD-CCNC: 44 U/L — HIGH (ref 0–41)
ALT FLD-CCNC: 46 U/L — HIGH (ref 0–41)
ALT FLD-CCNC: 48 U/L — HIGH (ref 0–41)
ALT FLD-CCNC: 59 U/L — HIGH (ref 0–41)
ALT FLD-CCNC: 60 U/L — HIGH (ref 0–41)
ANION GAP SERPL CALC-SCNC: 12 MMOL/L — SIGNIFICANT CHANGE UP (ref 7–14)
ANION GAP SERPL CALC-SCNC: 13 MMOL/L — SIGNIFICANT CHANGE UP (ref 7–14)
ANION GAP SERPL CALC-SCNC: 13 MMOL/L — SIGNIFICANT CHANGE UP (ref 7–14)
ANION GAP SERPL CALC-SCNC: 14 MMOL/L — SIGNIFICANT CHANGE UP (ref 7–14)
ANION GAP SERPL CALC-SCNC: 15 MMOL/L — HIGH (ref 7–14)
ANION GAP SERPL CALC-SCNC: 15 MMOL/L — HIGH (ref 7–14)
ANION GAP SERPL CALC-SCNC: 16 MMOL/L — HIGH (ref 7–14)
ANION GAP SERPL CALC-SCNC: 19 MMOL/L — HIGH (ref 7–14)
APPEARANCE UR: ABNORMAL
AST SERPL-CCNC: 29 U/L — SIGNIFICANT CHANGE UP (ref 0–41)
AST SERPL-CCNC: 30 U/L — SIGNIFICANT CHANGE UP (ref 0–41)
AST SERPL-CCNC: 31 U/L — SIGNIFICANT CHANGE UP (ref 0–41)
AST SERPL-CCNC: 32 U/L — SIGNIFICANT CHANGE UP (ref 0–41)
AST SERPL-CCNC: 35 U/L — SIGNIFICANT CHANGE UP (ref 0–41)
AST SERPL-CCNC: 39 U/L — SIGNIFICANT CHANGE UP (ref 0–41)
AST SERPL-CCNC: 47 U/L — HIGH (ref 0–41)
AST SERPL-CCNC: 48 U/L — HIGH (ref 0–41)
AST SERPL-CCNC: 48 U/L — HIGH (ref 0–41)
AST SERPL-CCNC: 50 U/L — HIGH (ref 0–41)
AST SERPL-CCNC: 51 U/L — HIGH (ref 0–41)
AST SERPL-CCNC: 56 U/L — HIGH (ref 0–41)
AST SERPL-CCNC: 57 U/L — HIGH (ref 0–41)
AST SERPL-CCNC: 59 U/L — HIGH (ref 0–41)
AST SERPL-CCNC: 71 U/L — HIGH (ref 0–41)
AST SERPL-CCNC: 73 U/L — HIGH (ref 0–41)
BACTERIA # UR AUTO: NEGATIVE — SIGNIFICANT CHANGE UP
BASE EXCESS BLDA CALC-SCNC: 4 MMOL/L — HIGH (ref -2–2)
BASE EXCESS BLDV CALC-SCNC: 5.6 MMOL/L — HIGH (ref -2–2)
BASOPHILS # BLD AUTO: 0 K/UL — SIGNIFICANT CHANGE UP (ref 0–0.2)
BASOPHILS # BLD AUTO: 0.01 K/UL — SIGNIFICANT CHANGE UP (ref 0–0.2)
BASOPHILS # BLD AUTO: 0.02 K/UL — SIGNIFICANT CHANGE UP (ref 0–0.2)
BASOPHILS # BLD AUTO: 0.03 K/UL — SIGNIFICANT CHANGE UP (ref 0–0.2)
BASOPHILS # BLD AUTO: 0.03 K/UL — SIGNIFICANT CHANGE UP (ref 0–0.2)
BASOPHILS NFR BLD AUTO: 0 % — SIGNIFICANT CHANGE UP (ref 0–1)
BASOPHILS NFR BLD AUTO: 0.1 % — SIGNIFICANT CHANGE UP (ref 0–1)
BASOPHILS NFR BLD AUTO: 0.2 % — SIGNIFICANT CHANGE UP (ref 0–1)
BASOPHILS NFR BLD AUTO: 0.3 % — SIGNIFICANT CHANGE UP (ref 0–1)
BILIRUB SERPL-MCNC: 0.4 MG/DL — SIGNIFICANT CHANGE UP (ref 0.2–1.2)
BILIRUB SERPL-MCNC: 0.5 MG/DL — SIGNIFICANT CHANGE UP (ref 0.2–1.2)
BILIRUB SERPL-MCNC: 0.5 MG/DL — SIGNIFICANT CHANGE UP (ref 0.2–1.2)
BILIRUB SERPL-MCNC: 0.6 MG/DL — SIGNIFICANT CHANGE UP (ref 0.2–1.2)
BILIRUB SERPL-MCNC: 0.7 MG/DL — SIGNIFICANT CHANGE UP (ref 0.2–1.2)
BILIRUB SERPL-MCNC: 0.7 MG/DL — SIGNIFICANT CHANGE UP (ref 0.2–1.2)
BILIRUB SERPL-MCNC: 0.8 MG/DL — SIGNIFICANT CHANGE UP (ref 0.2–1.2)
BILIRUB SERPL-MCNC: 0.9 MG/DL — SIGNIFICANT CHANGE UP (ref 0.2–1.2)
BILIRUB SERPL-MCNC: 1 MG/DL — SIGNIFICANT CHANGE UP (ref 0.2–1.2)
BILIRUB UR-MCNC: NEGATIVE — SIGNIFICANT CHANGE UP
BLD GP AB SCN SERPL QL: SIGNIFICANT CHANGE UP
BLD GP AB SCN SERPL QL: SIGNIFICANT CHANGE UP
BUN SERPL-MCNC: 12 MG/DL — SIGNIFICANT CHANGE UP (ref 10–20)
BUN SERPL-MCNC: 12 MG/DL — SIGNIFICANT CHANGE UP (ref 10–20)
BUN SERPL-MCNC: 14 MG/DL — SIGNIFICANT CHANGE UP (ref 10–20)
BUN SERPL-MCNC: 15 MG/DL — SIGNIFICANT CHANGE UP (ref 10–20)
BUN SERPL-MCNC: 16 MG/DL — SIGNIFICANT CHANGE UP (ref 10–20)
BUN SERPL-MCNC: 17 MG/DL — SIGNIFICANT CHANGE UP (ref 10–20)
BUN SERPL-MCNC: 17 MG/DL — SIGNIFICANT CHANGE UP (ref 10–20)
BUN SERPL-MCNC: 18 MG/DL — SIGNIFICANT CHANGE UP (ref 10–20)
BUN SERPL-MCNC: 19 MG/DL — SIGNIFICANT CHANGE UP (ref 10–20)
BUN SERPL-MCNC: 25 MG/DL — HIGH (ref 10–20)
BUN SERPL-MCNC: 25 MG/DL — HIGH (ref 10–20)
BUN SERPL-MCNC: 26 MG/DL — HIGH (ref 10–20)
BUN SERPL-MCNC: 29 MG/DL — HIGH (ref 10–20)
BUN SERPL-MCNC: 30 MG/DL — HIGH (ref 10–20)
CALCIUM SERPL-MCNC: 7.7 MG/DL — LOW (ref 8.5–10.1)
CALCIUM SERPL-MCNC: 7.8 MG/DL — LOW (ref 8.5–10.1)
CALCIUM SERPL-MCNC: 7.8 MG/DL — LOW (ref 8.5–10.1)
CALCIUM SERPL-MCNC: 7.9 MG/DL — LOW (ref 8.5–10.1)
CALCIUM SERPL-MCNC: 8 MG/DL — LOW (ref 8.5–10.1)
CALCIUM SERPL-MCNC: 8.1 MG/DL — LOW (ref 8.5–10.1)
CALCIUM SERPL-MCNC: 8.2 MG/DL — LOW (ref 8.5–10.1)
CALCIUM SERPL-MCNC: 8.3 MG/DL — LOW (ref 8.5–10.1)
CALCIUM SERPL-MCNC: 8.6 MG/DL — SIGNIFICANT CHANGE UP (ref 8.5–10.1)
CALCIUM SERPL-MCNC: 8.7 MG/DL — SIGNIFICANT CHANGE UP (ref 8.5–10.1)
CHLORIDE SERPL-SCNC: 100 MMOL/L — SIGNIFICANT CHANGE UP (ref 98–110)
CHLORIDE SERPL-SCNC: 101 MMOL/L — SIGNIFICANT CHANGE UP (ref 98–110)
CHLORIDE SERPL-SCNC: 103 MMOL/L — SIGNIFICANT CHANGE UP (ref 98–110)
CHLORIDE SERPL-SCNC: 86 MMOL/L — LOW (ref 98–110)
CHLORIDE SERPL-SCNC: 93 MMOL/L — LOW (ref 98–110)
CHLORIDE SERPL-SCNC: 95 MMOL/L — LOW (ref 98–110)
CHLORIDE SERPL-SCNC: 95 MMOL/L — LOW (ref 98–110)
CHLORIDE SERPL-SCNC: 97 MMOL/L — LOW (ref 98–110)
CHLORIDE SERPL-SCNC: 97 MMOL/L — LOW (ref 98–110)
CHLORIDE SERPL-SCNC: 98 MMOL/L — SIGNIFICANT CHANGE UP (ref 98–110)
CHLORIDE SERPL-SCNC: 99 MMOL/L — SIGNIFICANT CHANGE UP (ref 98–110)
CO2 SERPL-SCNC: 19 MMOL/L — SIGNIFICANT CHANGE UP (ref 17–32)
CO2 SERPL-SCNC: 23 MMOL/L — SIGNIFICANT CHANGE UP (ref 17–32)
CO2 SERPL-SCNC: 24 MMOL/L — SIGNIFICANT CHANGE UP (ref 17–32)
CO2 SERPL-SCNC: 24 MMOL/L — SIGNIFICANT CHANGE UP (ref 17–32)
CO2 SERPL-SCNC: 25 MMOL/L — SIGNIFICANT CHANGE UP (ref 17–32)
CO2 SERPL-SCNC: 26 MMOL/L — SIGNIFICANT CHANGE UP (ref 17–32)
CO2 SERPL-SCNC: 27 MMOL/L — SIGNIFICANT CHANGE UP (ref 17–32)
CO2 SERPL-SCNC: 28 MMOL/L — SIGNIFICANT CHANGE UP (ref 17–32)
CO2 SERPL-SCNC: 28 MMOL/L — SIGNIFICANT CHANGE UP (ref 17–32)
CO2 SERPL-SCNC: 29 MMOL/L — SIGNIFICANT CHANGE UP (ref 17–32)
COLOR SPEC: YELLOW — SIGNIFICANT CHANGE UP
COVID-19 SPIKE DOMAIN AB INTERP: NEGATIVE — SIGNIFICANT CHANGE UP
COVID-19 SPIKE DOMAIN ANTIBODY RESULT: 0.4 U/ML — SIGNIFICANT CHANGE UP
CREAT SERPL-MCNC: 0.5 MG/DL — LOW (ref 0.7–1.5)
CREAT SERPL-MCNC: 0.5 MG/DL — LOW (ref 0.7–1.5)
CREAT SERPL-MCNC: 0.6 MG/DL — LOW (ref 0.7–1.5)
CREAT SERPL-MCNC: 0.7 MG/DL — SIGNIFICANT CHANGE UP (ref 0.7–1.5)
CREAT SERPL-MCNC: 0.7 MG/DL — SIGNIFICANT CHANGE UP (ref 0.7–1.5)
CREAT SERPL-MCNC: 0.8 MG/DL — SIGNIFICANT CHANGE UP (ref 0.7–1.5)
CREAT SERPL-MCNC: 0.8 MG/DL — SIGNIFICANT CHANGE UP (ref 0.7–1.5)
CREAT SERPL-MCNC: 1 MG/DL — SIGNIFICANT CHANGE UP (ref 0.7–1.5)
CREAT SERPL-MCNC: 1.1 MG/DL — SIGNIFICANT CHANGE UP (ref 0.7–1.5)
CREAT SERPL-MCNC: 1.5 MG/DL — SIGNIFICANT CHANGE UP (ref 0.7–1.5)
CRP SERPL-MCNC: 11 MG/L — HIGH
CRP SERPL-MCNC: 204 MG/L — HIGH
CRP SERPL-MCNC: 21 MG/L — HIGH
CRP SERPL-MCNC: 4 MG/L — SIGNIFICANT CHANGE UP
CRP SERPL-MCNC: 42 MG/L — HIGH
CRP SERPL-MCNC: 51 MG/L — HIGH
CRP SERPL-MCNC: 7 MG/L — HIGH
CULTURE RESULTS: SIGNIFICANT CHANGE UP
D DIMER BLD IA.RAPID-MCNC: 1265 NG/ML DDU — HIGH (ref 0–230)
D DIMER BLD IA.RAPID-MCNC: 137 NG/ML DDU — SIGNIFICANT CHANGE UP (ref 0–230)
D DIMER BLD IA.RAPID-MCNC: 2220 NG/ML DDU — HIGH (ref 0–230)
D DIMER BLD IA.RAPID-MCNC: 227 NG/ML DDU — SIGNIFICANT CHANGE UP (ref 0–230)
D DIMER BLD IA.RAPID-MCNC: 4492 NG/ML DDU — HIGH (ref 0–230)
D DIMER BLD IA.RAPID-MCNC: 651 NG/ML DDU — HIGH (ref 0–230)
D DIMER BLD IA.RAPID-MCNC: 882 NG/ML DDU — HIGH (ref 0–230)
DIFF PNL FLD: NEGATIVE — SIGNIFICANT CHANGE UP
EOSINOPHIL # BLD AUTO: 0 K/UL — SIGNIFICANT CHANGE UP (ref 0–0.7)
EOSINOPHIL # BLD AUTO: 0.01 K/UL — SIGNIFICANT CHANGE UP (ref 0–0.7)
EOSINOPHIL # BLD AUTO: 0.03 K/UL — SIGNIFICANT CHANGE UP (ref 0–0.7)
EOSINOPHIL # BLD AUTO: 0.04 K/UL — SIGNIFICANT CHANGE UP (ref 0–0.7)
EOSINOPHIL # BLD AUTO: 0.04 K/UL — SIGNIFICANT CHANGE UP (ref 0–0.7)
EOSINOPHIL # BLD AUTO: 0.05 K/UL — SIGNIFICANT CHANGE UP (ref 0–0.7)
EOSINOPHIL # BLD AUTO: 0.05 K/UL — SIGNIFICANT CHANGE UP (ref 0–0.7)
EOSINOPHIL # BLD AUTO: 0.18 K/UL — SIGNIFICANT CHANGE UP (ref 0–0.7)
EOSINOPHIL # BLD AUTO: 0.46 K/UL — SIGNIFICANT CHANGE UP (ref 0–0.7)
EOSINOPHIL # BLD AUTO: 0.58 K/UL — SIGNIFICANT CHANGE UP (ref 0–0.7)
EOSINOPHIL NFR BLD AUTO: 0 % — SIGNIFICANT CHANGE UP (ref 0–8)
EOSINOPHIL NFR BLD AUTO: 0.1 % — SIGNIFICANT CHANGE UP (ref 0–8)
EOSINOPHIL NFR BLD AUTO: 0.2 % — SIGNIFICANT CHANGE UP (ref 0–8)
EOSINOPHIL NFR BLD AUTO: 0.3 % — SIGNIFICANT CHANGE UP (ref 0–8)
EOSINOPHIL NFR BLD AUTO: 0.3 % — SIGNIFICANT CHANGE UP (ref 0–8)
EOSINOPHIL NFR BLD AUTO: 0.4 % — SIGNIFICANT CHANGE UP (ref 0–8)
EOSINOPHIL NFR BLD AUTO: 0.4 % — SIGNIFICANT CHANGE UP (ref 0–8)
EOSINOPHIL NFR BLD AUTO: 1.1 % — SIGNIFICANT CHANGE UP (ref 0–8)
EOSINOPHIL NFR BLD AUTO: 2.8 % — SIGNIFICANT CHANGE UP (ref 0–8)
EOSINOPHIL NFR BLD AUTO: 3.2 % — SIGNIFICANT CHANGE UP (ref 0–8)
EPI CELLS # UR: 3 /HPF — SIGNIFICANT CHANGE UP (ref 0–5)
ESTIMATED AVERAGE GLUCOSE: 154 MG/DL — HIGH (ref 68–114)
FERRITIN SERPL-MCNC: 1359 NG/ML — HIGH (ref 15–150)
FERRITIN SERPL-MCNC: 1426 NG/ML — HIGH (ref 15–150)
FERRITIN SERPL-MCNC: 1574 NG/ML — HIGH (ref 15–150)
FERRITIN SERPL-MCNC: 2247 NG/ML — HIGH (ref 15–150)
FERRITIN SERPL-MCNC: 926 NG/ML — HIGH (ref 15–150)
GAS PNL BLDA: SIGNIFICANT CHANGE UP
GLUCOSE BLDC GLUCOMTR-MCNC: 121 MG/DL — HIGH (ref 70–99)
GLUCOSE BLDC GLUCOMTR-MCNC: 122 MG/DL — HIGH (ref 70–99)
GLUCOSE BLDC GLUCOMTR-MCNC: 131 MG/DL — HIGH (ref 70–99)
GLUCOSE BLDC GLUCOMTR-MCNC: 133 MG/DL — HIGH (ref 70–99)
GLUCOSE BLDC GLUCOMTR-MCNC: 139 MG/DL — HIGH (ref 70–99)
GLUCOSE BLDC GLUCOMTR-MCNC: 141 MG/DL — HIGH (ref 70–99)
GLUCOSE BLDC GLUCOMTR-MCNC: 142 MG/DL — HIGH (ref 70–99)
GLUCOSE BLDC GLUCOMTR-MCNC: 143 MG/DL — HIGH (ref 70–99)
GLUCOSE BLDC GLUCOMTR-MCNC: 146 MG/DL — HIGH (ref 70–99)
GLUCOSE BLDC GLUCOMTR-MCNC: 146 MG/DL — HIGH (ref 70–99)
GLUCOSE BLDC GLUCOMTR-MCNC: 147 MG/DL — HIGH (ref 70–99)
GLUCOSE BLDC GLUCOMTR-MCNC: 150 MG/DL — HIGH (ref 70–99)
GLUCOSE BLDC GLUCOMTR-MCNC: 151 MG/DL — HIGH (ref 70–99)
GLUCOSE BLDC GLUCOMTR-MCNC: 151 MG/DL — HIGH (ref 70–99)
GLUCOSE BLDC GLUCOMTR-MCNC: 155 MG/DL — HIGH (ref 70–99)
GLUCOSE BLDC GLUCOMTR-MCNC: 155 MG/DL — HIGH (ref 70–99)
GLUCOSE BLDC GLUCOMTR-MCNC: 156 MG/DL — HIGH (ref 70–99)
GLUCOSE BLDC GLUCOMTR-MCNC: 157 MG/DL — HIGH (ref 70–99)
GLUCOSE BLDC GLUCOMTR-MCNC: 158 MG/DL — HIGH (ref 70–99)
GLUCOSE BLDC GLUCOMTR-MCNC: 161 MG/DL — HIGH (ref 70–99)
GLUCOSE BLDC GLUCOMTR-MCNC: 165 MG/DL — HIGH (ref 70–99)
GLUCOSE BLDC GLUCOMTR-MCNC: 169 MG/DL — HIGH (ref 70–99)
GLUCOSE BLDC GLUCOMTR-MCNC: 169 MG/DL — HIGH (ref 70–99)
GLUCOSE BLDC GLUCOMTR-MCNC: 171 MG/DL — HIGH (ref 70–99)
GLUCOSE BLDC GLUCOMTR-MCNC: 173 MG/DL — HIGH (ref 70–99)
GLUCOSE BLDC GLUCOMTR-MCNC: 175 MG/DL — HIGH (ref 70–99)
GLUCOSE BLDC GLUCOMTR-MCNC: 175 MG/DL — HIGH (ref 70–99)
GLUCOSE BLDC GLUCOMTR-MCNC: 177 MG/DL — HIGH (ref 70–99)
GLUCOSE BLDC GLUCOMTR-MCNC: 179 MG/DL — HIGH (ref 70–99)
GLUCOSE BLDC GLUCOMTR-MCNC: 180 MG/DL — HIGH (ref 70–99)
GLUCOSE BLDC GLUCOMTR-MCNC: 181 MG/DL — HIGH (ref 70–99)
GLUCOSE BLDC GLUCOMTR-MCNC: 188 MG/DL — HIGH (ref 70–99)
GLUCOSE BLDC GLUCOMTR-MCNC: 194 MG/DL — HIGH (ref 70–99)
GLUCOSE BLDC GLUCOMTR-MCNC: 195 MG/DL — HIGH (ref 70–99)
GLUCOSE BLDC GLUCOMTR-MCNC: 196 MG/DL — HIGH (ref 70–99)
GLUCOSE BLDC GLUCOMTR-MCNC: 201 MG/DL — HIGH (ref 70–99)
GLUCOSE BLDC GLUCOMTR-MCNC: 202 MG/DL — HIGH (ref 70–99)
GLUCOSE BLDC GLUCOMTR-MCNC: 205 MG/DL — HIGH (ref 70–99)
GLUCOSE BLDC GLUCOMTR-MCNC: 205 MG/DL — HIGH (ref 70–99)
GLUCOSE BLDC GLUCOMTR-MCNC: 208 MG/DL — HIGH (ref 70–99)
GLUCOSE BLDC GLUCOMTR-MCNC: 213 MG/DL — HIGH (ref 70–99)
GLUCOSE BLDC GLUCOMTR-MCNC: 222 MG/DL — HIGH (ref 70–99)
GLUCOSE BLDC GLUCOMTR-MCNC: 224 MG/DL — HIGH (ref 70–99)
GLUCOSE BLDC GLUCOMTR-MCNC: 224 MG/DL — HIGH (ref 70–99)
GLUCOSE BLDC GLUCOMTR-MCNC: 232 MG/DL — HIGH (ref 70–99)
GLUCOSE BLDC GLUCOMTR-MCNC: 234 MG/DL — HIGH (ref 70–99)
GLUCOSE BLDC GLUCOMTR-MCNC: 236 MG/DL — HIGH (ref 70–99)
GLUCOSE BLDC GLUCOMTR-MCNC: 238 MG/DL — HIGH (ref 70–99)
GLUCOSE BLDC GLUCOMTR-MCNC: 240 MG/DL — HIGH (ref 70–99)
GLUCOSE BLDC GLUCOMTR-MCNC: 260 MG/DL — HIGH (ref 70–99)
GLUCOSE BLDC GLUCOMTR-MCNC: 262 MG/DL — HIGH (ref 70–99)
GLUCOSE BLDC GLUCOMTR-MCNC: 266 MG/DL — HIGH (ref 70–99)
GLUCOSE BLDC GLUCOMTR-MCNC: 270 MG/DL — HIGH (ref 70–99)
GLUCOSE BLDC GLUCOMTR-MCNC: 287 MG/DL — HIGH (ref 70–99)
GLUCOSE BLDC GLUCOMTR-MCNC: 296 MG/DL — HIGH (ref 70–99)
GLUCOSE BLDC GLUCOMTR-MCNC: 80 MG/DL — SIGNIFICANT CHANGE UP (ref 70–99)
GLUCOSE BLDC GLUCOMTR-MCNC: 98 MG/DL — SIGNIFICANT CHANGE UP (ref 70–99)
GLUCOSE SERPL-MCNC: 103 MG/DL — HIGH (ref 70–99)
GLUCOSE SERPL-MCNC: 117 MG/DL — HIGH (ref 70–99)
GLUCOSE SERPL-MCNC: 131 MG/DL — HIGH (ref 70–99)
GLUCOSE SERPL-MCNC: 143 MG/DL — HIGH (ref 70–99)
GLUCOSE SERPL-MCNC: 146 MG/DL — HIGH (ref 70–99)
GLUCOSE SERPL-MCNC: 149 MG/DL — HIGH (ref 70–99)
GLUCOSE SERPL-MCNC: 152 MG/DL — HIGH (ref 70–99)
GLUCOSE SERPL-MCNC: 165 MG/DL — HIGH (ref 70–99)
GLUCOSE SERPL-MCNC: 169 MG/DL — HIGH (ref 70–99)
GLUCOSE SERPL-MCNC: 172 MG/DL — HIGH (ref 70–99)
GLUCOSE SERPL-MCNC: 185 MG/DL — HIGH (ref 70–99)
GLUCOSE SERPL-MCNC: 192 MG/DL — HIGH (ref 70–99)
GLUCOSE SERPL-MCNC: 203 MG/DL — HIGH (ref 70–99)
GLUCOSE SERPL-MCNC: 204 MG/DL — HIGH (ref 70–99)
GLUCOSE SERPL-MCNC: 223 MG/DL — HIGH (ref 70–99)
GLUCOSE SERPL-MCNC: 228 MG/DL — HIGH (ref 70–99)
GLUCOSE UR QL: NEGATIVE — SIGNIFICANT CHANGE UP
HCG SERPL QL: NEGATIVE — SIGNIFICANT CHANGE UP
HCO3 BLDA-SCNC: 28 MMOL/L — HIGH (ref 23–27)
HCO3 BLDV-SCNC: 29 MMOL/L — SIGNIFICANT CHANGE UP (ref 22–29)
HCT VFR BLD CALC: 35.6 % — LOW (ref 37–47)
HCT VFR BLD CALC: 36.8 % — LOW (ref 37–47)
HCT VFR BLD CALC: 37 % — SIGNIFICANT CHANGE UP (ref 37–47)
HCT VFR BLD CALC: 37.2 % — SIGNIFICANT CHANGE UP (ref 37–47)
HCT VFR BLD CALC: 37.3 % — SIGNIFICANT CHANGE UP (ref 37–47)
HCT VFR BLD CALC: 37.3 % — SIGNIFICANT CHANGE UP (ref 37–47)
HCT VFR BLD CALC: 37.8 % — SIGNIFICANT CHANGE UP (ref 37–47)
HCT VFR BLD CALC: 38.4 % — SIGNIFICANT CHANGE UP (ref 37–47)
HCT VFR BLD CALC: 38.5 % — SIGNIFICANT CHANGE UP (ref 37–47)
HCT VFR BLD CALC: 38.7 % — SIGNIFICANT CHANGE UP (ref 37–47)
HCT VFR BLD CALC: 39 % — SIGNIFICANT CHANGE UP (ref 37–47)
HCT VFR BLD CALC: 39.1 % — SIGNIFICANT CHANGE UP (ref 37–47)
HCT VFR BLD CALC: 39.4 % — SIGNIFICANT CHANGE UP (ref 37–47)
HCT VFR BLD CALC: 39.5 % — SIGNIFICANT CHANGE UP (ref 37–47)
HCT VFR BLD CALC: 39.7 % — SIGNIFICANT CHANGE UP (ref 37–47)
HCT VFR BLD CALC: 40.9 % — SIGNIFICANT CHANGE UP (ref 37–47)
HCV AB S/CO SERPL IA: 0.03 COI — SIGNIFICANT CHANGE UP
HCV AB SERPL-IMP: SIGNIFICANT CHANGE UP
HGB BLD-MCNC: 11.7 G/DL — LOW (ref 12–16)
HGB BLD-MCNC: 11.8 G/DL — LOW (ref 12–16)
HGB BLD-MCNC: 11.9 G/DL — LOW (ref 12–16)
HGB BLD-MCNC: 12.2 G/DL — SIGNIFICANT CHANGE UP (ref 12–16)
HGB BLD-MCNC: 12.3 G/DL — SIGNIFICANT CHANGE UP (ref 12–16)
HGB BLD-MCNC: 12.3 G/DL — SIGNIFICANT CHANGE UP (ref 12–16)
HGB BLD-MCNC: 12.4 G/DL — SIGNIFICANT CHANGE UP (ref 12–16)
HGB BLD-MCNC: 12.5 G/DL — SIGNIFICANT CHANGE UP (ref 12–16)
HGB BLD-MCNC: 12.6 G/DL — SIGNIFICANT CHANGE UP (ref 12–16)
HGB BLD-MCNC: 12.7 G/DL — SIGNIFICANT CHANGE UP (ref 12–16)
HGB BLD-MCNC: 12.8 G/DL — SIGNIFICANT CHANGE UP (ref 12–16)
HGB BLD-MCNC: 13 G/DL — SIGNIFICANT CHANGE UP (ref 12–16)
HGB BLD-MCNC: 13.2 G/DL — SIGNIFICANT CHANGE UP (ref 12–16)
HGB BLD-MCNC: 13.2 G/DL — SIGNIFICANT CHANGE UP (ref 12–16)
HYALINE CASTS # UR AUTO: 0 /LPF — SIGNIFICANT CHANGE UP (ref 0–7)
IMM GRANULOCYTES NFR BLD AUTO: 0.5 % — HIGH (ref 0.1–0.3)
IMM GRANULOCYTES NFR BLD AUTO: 0.5 % — HIGH (ref 0.1–0.3)
IMM GRANULOCYTES NFR BLD AUTO: 0.6 % — HIGH (ref 0.1–0.3)
IMM GRANULOCYTES NFR BLD AUTO: 0.7 % — HIGH (ref 0.1–0.3)
IMM GRANULOCYTES NFR BLD AUTO: 0.8 % — HIGH (ref 0.1–0.3)
IMM GRANULOCYTES NFR BLD AUTO: 0.8 % — HIGH (ref 0.1–0.3)
IMM GRANULOCYTES NFR BLD AUTO: 0.9 % — HIGH (ref 0.1–0.3)
IMM GRANULOCYTES NFR BLD AUTO: 1.2 % — HIGH (ref 0.1–0.3)
IMM GRANULOCYTES NFR BLD AUTO: 1.2 % — HIGH (ref 0.1–0.3)
IMM GRANULOCYTES NFR BLD AUTO: 1.4 % — HIGH (ref 0.1–0.3)
IMM GRANULOCYTES NFR BLD AUTO: 2.3 % — HIGH (ref 0.1–0.3)
IMM GRANULOCYTES NFR BLD AUTO: 2.7 % — HIGH (ref 0.1–0.3)
INR BLD: 1.03 RATIO — SIGNIFICANT CHANGE UP (ref 0.65–1.3)
KETONES UR-MCNC: NEGATIVE — SIGNIFICANT CHANGE UP
LACTATE BLDV-MCNC: 1.6 MMOL/L — SIGNIFICANT CHANGE UP (ref 0.5–1.6)
LEGIONELLA AG UR QL: NEGATIVE — SIGNIFICANT CHANGE UP
LEUKOCYTE ESTERASE UR-ACNC: NEGATIVE — SIGNIFICANT CHANGE UP
LYMPHOCYTES # BLD AUTO: 0.57 K/UL — LOW (ref 1.2–3.4)
LYMPHOCYTES # BLD AUTO: 0.68 K/UL — LOW (ref 1.2–3.4)
LYMPHOCYTES # BLD AUTO: 0.73 K/UL — LOW (ref 1.2–3.4)
LYMPHOCYTES # BLD AUTO: 0.73 K/UL — LOW (ref 1.2–3.4)
LYMPHOCYTES # BLD AUTO: 0.8 K/UL — LOW (ref 1.2–3.4)
LYMPHOCYTES # BLD AUTO: 0.89 K/UL — LOW (ref 1.2–3.4)
LYMPHOCYTES # BLD AUTO: 0.9 K/UL — LOW (ref 1.2–3.4)
LYMPHOCYTES # BLD AUTO: 0.94 K/UL — LOW (ref 1.2–3.4)
LYMPHOCYTES # BLD AUTO: 1.04 K/UL — LOW (ref 1.2–3.4)
LYMPHOCYTES # BLD AUTO: 1.12 K/UL — LOW (ref 1.2–3.4)
LYMPHOCYTES # BLD AUTO: 1.13 K/UL — LOW (ref 1.2–3.4)
LYMPHOCYTES # BLD AUTO: 1.16 K/UL — LOW (ref 1.2–3.4)
LYMPHOCYTES # BLD AUTO: 1.32 K/UL — SIGNIFICANT CHANGE UP (ref 1.2–3.4)
LYMPHOCYTES # BLD AUTO: 1.51 K/UL — SIGNIFICANT CHANGE UP (ref 1.2–3.4)
LYMPHOCYTES # BLD AUTO: 1.54 K/UL — SIGNIFICANT CHANGE UP (ref 1.2–3.4)
LYMPHOCYTES # BLD AUTO: 1.66 K/UL — SIGNIFICANT CHANGE UP (ref 1.2–3.4)
LYMPHOCYTES # BLD AUTO: 10.6 % — LOW (ref 20.5–51.1)
LYMPHOCYTES # BLD AUTO: 11.3 % — LOW (ref 20.5–51.1)
LYMPHOCYTES # BLD AUTO: 11.5 % — LOW (ref 20.5–51.1)
LYMPHOCYTES # BLD AUTO: 11.9 % — LOW (ref 20.5–51.1)
LYMPHOCYTES # BLD AUTO: 11.9 % — LOW (ref 20.5–51.1)
LYMPHOCYTES # BLD AUTO: 16.7 % — LOW (ref 20.5–51.1)
LYMPHOCYTES # BLD AUTO: 17 % — LOW (ref 20.5–51.1)
LYMPHOCYTES # BLD AUTO: 4.4 % — LOW (ref 20.5–51.1)
LYMPHOCYTES # BLD AUTO: 4.6 % — LOW (ref 20.5–51.1)
LYMPHOCYTES # BLD AUTO: 5.2 % — LOW (ref 20.5–51.1)
LYMPHOCYTES # BLD AUTO: 5.7 % — LOW (ref 20.5–51.1)
LYMPHOCYTES # BLD AUTO: 7 % — LOW (ref 20.5–51.1)
LYMPHOCYTES # BLD AUTO: 7 % — LOW (ref 20.5–51.1)
LYMPHOCYTES # BLD AUTO: 7.2 % — LOW (ref 20.5–51.1)
LYMPHOCYTES # BLD AUTO: 8.4 % — LOW (ref 20.5–51.1)
LYMPHOCYTES # BLD AUTO: 9 % — LOW (ref 20.5–51.1)
MAGNESIUM SERPL-MCNC: 1.4 MG/DL — LOW (ref 1.8–2.4)
MAGNESIUM SERPL-MCNC: 1.7 MG/DL — LOW (ref 1.8–2.4)
MAGNESIUM SERPL-MCNC: 1.9 MG/DL — SIGNIFICANT CHANGE UP (ref 1.8–2.4)
MAGNESIUM SERPL-MCNC: 1.9 MG/DL — SIGNIFICANT CHANGE UP (ref 1.8–2.4)
MAGNESIUM SERPL-MCNC: 2 MG/DL — SIGNIFICANT CHANGE UP (ref 1.8–2.4)
MAGNESIUM SERPL-MCNC: 2.1 MG/DL — SIGNIFICANT CHANGE UP (ref 1.8–2.4)
MAGNESIUM SERPL-MCNC: 2.2 MG/DL — SIGNIFICANT CHANGE UP (ref 1.8–2.4)
MAGNESIUM SERPL-MCNC: 2.3 MG/DL — SIGNIFICANT CHANGE UP (ref 1.8–2.4)
MCHC RBC-ENTMCNC: 28.9 PG — SIGNIFICANT CHANGE UP (ref 27–31)
MCHC RBC-ENTMCNC: 29 PG — SIGNIFICANT CHANGE UP (ref 27–31)
MCHC RBC-ENTMCNC: 29 PG — SIGNIFICANT CHANGE UP (ref 27–31)
MCHC RBC-ENTMCNC: 29.2 PG — SIGNIFICANT CHANGE UP (ref 27–31)
MCHC RBC-ENTMCNC: 29.3 PG — SIGNIFICANT CHANGE UP (ref 27–31)
MCHC RBC-ENTMCNC: 29.5 PG — SIGNIFICANT CHANGE UP (ref 27–31)
MCHC RBC-ENTMCNC: 29.6 PG — SIGNIFICANT CHANGE UP (ref 27–31)
MCHC RBC-ENTMCNC: 29.8 PG — SIGNIFICANT CHANGE UP (ref 27–31)
MCHC RBC-ENTMCNC: 29.8 PG — SIGNIFICANT CHANGE UP (ref 27–31)
MCHC RBC-ENTMCNC: 29.9 PG — SIGNIFICANT CHANGE UP (ref 27–31)
MCHC RBC-ENTMCNC: 30.1 PG — SIGNIFICANT CHANGE UP (ref 27–31)
MCHC RBC-ENTMCNC: 30.3 PG — SIGNIFICANT CHANGE UP (ref 27–31)
MCHC RBC-ENTMCNC: 30.9 G/DL — LOW (ref 32–37)
MCHC RBC-ENTMCNC: 31.4 G/DL — LOW (ref 32–37)
MCHC RBC-ENTMCNC: 31.4 G/DL — LOW (ref 32–37)
MCHC RBC-ENTMCNC: 31.8 G/DL — LOW (ref 32–37)
MCHC RBC-ENTMCNC: 32.1 G/DL — SIGNIFICANT CHANGE UP (ref 32–37)
MCHC RBC-ENTMCNC: 32.2 G/DL — SIGNIFICANT CHANGE UP (ref 32–37)
MCHC RBC-ENTMCNC: 32.2 G/DL — SIGNIFICANT CHANGE UP (ref 32–37)
MCHC RBC-ENTMCNC: 32.3 G/DL — SIGNIFICANT CHANGE UP (ref 32–37)
MCHC RBC-ENTMCNC: 32.3 G/DL — SIGNIFICANT CHANGE UP (ref 32–37)
MCHC RBC-ENTMCNC: 33 G/DL — SIGNIFICANT CHANGE UP (ref 32–37)
MCHC RBC-ENTMCNC: 33.2 G/DL — SIGNIFICANT CHANGE UP (ref 32–37)
MCHC RBC-ENTMCNC: 33.3 G/DL — SIGNIFICANT CHANGE UP (ref 32–37)
MCHC RBC-ENTMCNC: 33.5 G/DL — SIGNIFICANT CHANGE UP (ref 32–37)
MCHC RBC-ENTMCNC: 34.1 G/DL — SIGNIFICANT CHANGE UP (ref 32–37)
MCHC RBC-ENTMCNC: 34.1 G/DL — SIGNIFICANT CHANGE UP (ref 32–37)
MCHC RBC-ENTMCNC: 34.6 G/DL — SIGNIFICANT CHANGE UP (ref 32–37)
MCV RBC AUTO: 85.6 FL — SIGNIFICANT CHANGE UP (ref 81–99)
MCV RBC AUTO: 87.3 FL — SIGNIFICANT CHANGE UP (ref 81–99)
MCV RBC AUTO: 87.3 FL — SIGNIFICANT CHANGE UP (ref 81–99)
MCV RBC AUTO: 87.9 FL — SIGNIFICANT CHANGE UP (ref 81–99)
MCV RBC AUTO: 88.4 FL — SIGNIFICANT CHANGE UP (ref 81–99)
MCV RBC AUTO: 89.2 FL — SIGNIFICANT CHANGE UP (ref 81–99)
MCV RBC AUTO: 89.4 FL — SIGNIFICANT CHANGE UP (ref 81–99)
MCV RBC AUTO: 89.5 FL — SIGNIFICANT CHANGE UP (ref 81–99)
MCV RBC AUTO: 89.9 FL — SIGNIFICANT CHANGE UP (ref 81–99)
MCV RBC AUTO: 90.9 FL — SIGNIFICANT CHANGE UP (ref 81–99)
MCV RBC AUTO: 91.7 FL — SIGNIFICANT CHANGE UP (ref 81–99)
MCV RBC AUTO: 93.2 FL — SIGNIFICANT CHANGE UP (ref 81–99)
MCV RBC AUTO: 93.9 FL — SIGNIFICANT CHANGE UP (ref 81–99)
MCV RBC AUTO: 94.4 FL — SIGNIFICANT CHANGE UP (ref 81–99)
MCV RBC AUTO: 94.4 FL — SIGNIFICANT CHANGE UP (ref 81–99)
MCV RBC AUTO: 94.8 FL — SIGNIFICANT CHANGE UP (ref 81–99)
MONOCYTES # BLD AUTO: 0.32 K/UL — SIGNIFICANT CHANGE UP (ref 0.1–0.6)
MONOCYTES # BLD AUTO: 0.32 K/UL — SIGNIFICANT CHANGE UP (ref 0.1–0.6)
MONOCYTES # BLD AUTO: 0.4 K/UL — SIGNIFICANT CHANGE UP (ref 0.1–0.6)
MONOCYTES # BLD AUTO: 0.41 K/UL — SIGNIFICANT CHANGE UP (ref 0.1–0.6)
MONOCYTES # BLD AUTO: 0.47 K/UL — SIGNIFICANT CHANGE UP (ref 0.1–0.6)
MONOCYTES # BLD AUTO: 0.56 K/UL — SIGNIFICANT CHANGE UP (ref 0.1–0.6)
MONOCYTES # BLD AUTO: 0.58 K/UL — SIGNIFICANT CHANGE UP (ref 0.1–0.6)
MONOCYTES # BLD AUTO: 0.69 K/UL — HIGH (ref 0.1–0.6)
MONOCYTES # BLD AUTO: 0.72 K/UL — HIGH (ref 0.1–0.6)
MONOCYTES # BLD AUTO: 0.79 K/UL — HIGH (ref 0.1–0.6)
MONOCYTES # BLD AUTO: 0.83 K/UL — HIGH (ref 0.1–0.6)
MONOCYTES # BLD AUTO: 0.83 K/UL — HIGH (ref 0.1–0.6)
MONOCYTES # BLD AUTO: 0.87 K/UL — HIGH (ref 0.1–0.6)
MONOCYTES # BLD AUTO: 0.99 K/UL — HIGH (ref 0.1–0.6)
MONOCYTES # BLD AUTO: 1.37 K/UL — HIGH (ref 0.1–0.6)
MONOCYTES # BLD AUTO: 1.48 K/UL — HIGH (ref 0.1–0.6)
MONOCYTES NFR BLD AUTO: 10 % — HIGH (ref 1.7–9.3)
MONOCYTES NFR BLD AUTO: 10.4 % — HIGH (ref 1.7–9.3)
MONOCYTES NFR BLD AUTO: 13.5 % — HIGH (ref 1.7–9.3)
MONOCYTES NFR BLD AUTO: 14.1 % — HIGH (ref 1.7–9.3)
MONOCYTES NFR BLD AUTO: 2 % — SIGNIFICANT CHANGE UP (ref 1.7–9.3)
MONOCYTES NFR BLD AUTO: 2.3 % — SIGNIFICANT CHANGE UP (ref 1.7–9.3)
MONOCYTES NFR BLD AUTO: 2.4 % — SIGNIFICANT CHANGE UP (ref 1.7–9.3)
MONOCYTES NFR BLD AUTO: 3.9 % — SIGNIFICANT CHANGE UP (ref 1.7–9.3)
MONOCYTES NFR BLD AUTO: 4.5 % — SIGNIFICANT CHANGE UP (ref 1.7–9.3)
MONOCYTES NFR BLD AUTO: 5.6 % — SIGNIFICANT CHANGE UP (ref 1.7–9.3)
MONOCYTES NFR BLD AUTO: 5.7 % — SIGNIFICANT CHANGE UP (ref 1.7–9.3)
MONOCYTES NFR BLD AUTO: 5.8 % — SIGNIFICANT CHANGE UP (ref 1.7–9.3)
MONOCYTES NFR BLD AUTO: 5.9 % — SIGNIFICANT CHANGE UP (ref 1.7–9.3)
MONOCYTES NFR BLD AUTO: 6.1 % — SIGNIFICANT CHANGE UP (ref 1.7–9.3)
MONOCYTES NFR BLD AUTO: 6.4 % — SIGNIFICANT CHANGE UP (ref 1.7–9.3)
MONOCYTES NFR BLD AUTO: 6.6 % — SIGNIFICANT CHANGE UP (ref 1.7–9.3)
NEUTROPHILS # BLD AUTO: 11.2 K/UL — HIGH (ref 1.4–6.5)
NEUTROPHILS # BLD AUTO: 11.3 K/UL — HIGH (ref 1.4–6.5)
NEUTROPHILS # BLD AUTO: 11.75 K/UL — HIGH (ref 1.4–6.5)
NEUTROPHILS # BLD AUTO: 11.91 K/UL — HIGH (ref 1.4–6.5)
NEUTROPHILS # BLD AUTO: 12.01 K/UL — HIGH (ref 1.4–6.5)
NEUTROPHILS # BLD AUTO: 12.28 K/UL — HIGH (ref 1.4–6.5)
NEUTROPHILS # BLD AUTO: 12.57 K/UL — HIGH (ref 1.4–6.5)
NEUTROPHILS # BLD AUTO: 12.91 K/UL — HIGH (ref 1.4–6.5)
NEUTROPHILS # BLD AUTO: 14.52 K/UL — HIGH (ref 1.4–6.5)
NEUTROPHILS # BLD AUTO: 14.65 K/UL — HIGH (ref 1.4–6.5)
NEUTROPHILS # BLD AUTO: 15.4 K/UL — HIGH (ref 1.4–6.5)
NEUTROPHILS # BLD AUTO: 3.68 K/UL — SIGNIFICANT CHANGE UP (ref 1.4–6.5)
NEUTROPHILS # BLD AUTO: 4.67 K/UL — SIGNIFICANT CHANGE UP (ref 1.4–6.5)
NEUTROPHILS # BLD AUTO: 4.79 K/UL — SIGNIFICANT CHANGE UP (ref 1.4–6.5)
NEUTROPHILS # BLD AUTO: 5.76 K/UL — SIGNIFICANT CHANGE UP (ref 1.4–6.5)
NEUTROPHILS # BLD AUTO: 7.08 K/UL — HIGH (ref 1.4–6.5)
NEUTROPHILS NFR BLD AUTO: 68.9 % — SIGNIFICANT CHANGE UP (ref 42.2–75.2)
NEUTROPHILS NFR BLD AUTO: 71.8 % — SIGNIFICANT CHANGE UP (ref 42.2–75.2)
NEUTROPHILS NFR BLD AUTO: 73 % — SIGNIFICANT CHANGE UP (ref 42.2–75.2)
NEUTROPHILS NFR BLD AUTO: 79.7 % — HIGH (ref 42.2–75.2)
NEUTROPHILS NFR BLD AUTO: 81.3 % — HIGH (ref 42.2–75.2)
NEUTROPHILS NFR BLD AUTO: 82 % — HIGH (ref 42.2–75.2)
NEUTROPHILS NFR BLD AUTO: 82.2 % — HIGH (ref 42.2–75.2)
NEUTROPHILS NFR BLD AUTO: 83.1 % — HIGH (ref 42.2–75.2)
NEUTROPHILS NFR BLD AUTO: 83.6 % — HIGH (ref 42.2–75.2)
NEUTROPHILS NFR BLD AUTO: 84 % — HIGH (ref 42.2–75.2)
NEUTROPHILS NFR BLD AUTO: 85.2 % — HIGH (ref 42.2–75.2)
NEUTROPHILS NFR BLD AUTO: 87 % — HIGH (ref 42.2–75.2)
NEUTROPHILS NFR BLD AUTO: 87.3 % — HIGH (ref 42.2–75.2)
NEUTROPHILS NFR BLD AUTO: 87.8 % — HIGH (ref 42.2–75.2)
NEUTROPHILS NFR BLD AUTO: 88.3 % — HIGH (ref 42.2–75.2)
NEUTROPHILS NFR BLD AUTO: 91.4 % — HIGH (ref 42.2–75.2)
NITRITE UR-MCNC: NEGATIVE — SIGNIFICANT CHANGE UP
NRBC # BLD: 0 /100 WBCS — SIGNIFICANT CHANGE UP (ref 0–0)
PCO2 BLDA: 40 MMHG — SIGNIFICANT CHANGE UP (ref 38–42)
PCO2 BLDV: 38 MMHG — LOW (ref 41–51)
PH BLDA: 7.46 — HIGH (ref 7.38–7.42)
PH BLDV: 7.5 — HIGH (ref 7.26–7.43)
PH UR: 6.5 — SIGNIFICANT CHANGE UP (ref 5–8)
PHOSPHATE SERPL-MCNC: 3.7 MG/DL — SIGNIFICANT CHANGE UP (ref 2.1–4.9)
PHOSPHATE SERPL-MCNC: 3.8 MG/DL — SIGNIFICANT CHANGE UP (ref 2.1–4.9)
PHOSPHATE SERPL-MCNC: 4.1 MG/DL — SIGNIFICANT CHANGE UP (ref 2.1–4.9)
PHOSPHATE SERPL-MCNC: 4.5 MG/DL — SIGNIFICANT CHANGE UP (ref 2.1–4.9)
PLATELET # BLD AUTO: 299 K/UL — SIGNIFICANT CHANGE UP (ref 130–400)
PLATELET # BLD AUTO: 303 K/UL — SIGNIFICANT CHANGE UP (ref 130–400)
PLATELET # BLD AUTO: 355 K/UL — SIGNIFICANT CHANGE UP (ref 130–400)
PLATELET # BLD AUTO: 361 K/UL — SIGNIFICANT CHANGE UP (ref 130–400)
PLATELET # BLD AUTO: 378 K/UL — SIGNIFICANT CHANGE UP (ref 130–400)
PLATELET # BLD AUTO: 388 K/UL — SIGNIFICANT CHANGE UP (ref 130–400)
PLATELET # BLD AUTO: 390 K/UL — SIGNIFICANT CHANGE UP (ref 130–400)
PLATELET # BLD AUTO: 404 K/UL — HIGH (ref 130–400)
PLATELET # BLD AUTO: 415 K/UL — HIGH (ref 130–400)
PLATELET # BLD AUTO: 417 K/UL — HIGH (ref 130–400)
PLATELET # BLD AUTO: 418 K/UL — HIGH (ref 130–400)
PLATELET # BLD AUTO: 422 K/UL — HIGH (ref 130–400)
PLATELET # BLD AUTO: 440 K/UL — HIGH (ref 130–400)
PLATELET # BLD AUTO: 455 K/UL — HIGH (ref 130–400)
PLATELET # BLD AUTO: 459 K/UL — HIGH (ref 130–400)
PLATELET # BLD AUTO: 481 K/UL — HIGH (ref 130–400)
PO2 BLDA: 58 MMHG — LOW (ref 78–95)
PO2 BLDV: 22 MMHG — SIGNIFICANT CHANGE UP (ref 20–40)
POTASSIUM SERPL-MCNC: 3.3 MMOL/L — LOW (ref 3.5–5)
POTASSIUM SERPL-MCNC: 3.6 MMOL/L — SIGNIFICANT CHANGE UP (ref 3.5–5)
POTASSIUM SERPL-MCNC: 3.7 MMOL/L — SIGNIFICANT CHANGE UP (ref 3.5–5)
POTASSIUM SERPL-MCNC: 3.7 MMOL/L — SIGNIFICANT CHANGE UP (ref 3.5–5)
POTASSIUM SERPL-MCNC: 3.8 MMOL/L — SIGNIFICANT CHANGE UP (ref 3.5–5)
POTASSIUM SERPL-MCNC: 3.9 MMOL/L — SIGNIFICANT CHANGE UP (ref 3.5–5)
POTASSIUM SERPL-MCNC: 4 MMOL/L — SIGNIFICANT CHANGE UP (ref 3.5–5)
POTASSIUM SERPL-MCNC: 4.1 MMOL/L — SIGNIFICANT CHANGE UP (ref 3.5–5)
POTASSIUM SERPL-MCNC: 4.3 MMOL/L — SIGNIFICANT CHANGE UP (ref 3.5–5)
POTASSIUM SERPL-SCNC: 3.3 MMOL/L — LOW (ref 3.5–5)
POTASSIUM SERPL-SCNC: 3.6 MMOL/L — SIGNIFICANT CHANGE UP (ref 3.5–5)
POTASSIUM SERPL-SCNC: 3.7 MMOL/L — SIGNIFICANT CHANGE UP (ref 3.5–5)
POTASSIUM SERPL-SCNC: 3.7 MMOL/L — SIGNIFICANT CHANGE UP (ref 3.5–5)
POTASSIUM SERPL-SCNC: 3.8 MMOL/L — SIGNIFICANT CHANGE UP (ref 3.5–5)
POTASSIUM SERPL-SCNC: 3.9 MMOL/L — SIGNIFICANT CHANGE UP (ref 3.5–5)
POTASSIUM SERPL-SCNC: 4 MMOL/L — SIGNIFICANT CHANGE UP (ref 3.5–5)
POTASSIUM SERPL-SCNC: 4.1 MMOL/L — SIGNIFICANT CHANGE UP (ref 3.5–5)
POTASSIUM SERPL-SCNC: 4.3 MMOL/L — SIGNIFICANT CHANGE UP (ref 3.5–5)
PROCALCITONIN SERPL-MCNC: 0.03 NG/ML — SIGNIFICANT CHANGE UP (ref 0.02–0.1)
PROCALCITONIN SERPL-MCNC: 0.05 NG/ML — SIGNIFICANT CHANGE UP (ref 0.02–0.1)
PROCALCITONIN SERPL-MCNC: 0.07 NG/ML — SIGNIFICANT CHANGE UP (ref 0.02–0.1)
PROCALCITONIN SERPL-MCNC: 0.09 NG/ML — SIGNIFICANT CHANGE UP (ref 0.02–0.1)
PROCALCITONIN SERPL-MCNC: 0.1 NG/ML — SIGNIFICANT CHANGE UP (ref 0.02–0.1)
PROCALCITONIN SERPL-MCNC: 0.27 NG/ML — HIGH (ref 0.02–0.1)
PROCALCITONIN SERPL-MCNC: 0.83 NG/ML — HIGH (ref 0.02–0.1)
PROT SERPL-MCNC: 5.6 G/DL — LOW (ref 6–8)
PROT SERPL-MCNC: 5.6 G/DL — LOW (ref 6–8)
PROT SERPL-MCNC: 5.7 G/DL — LOW (ref 6–8)
PROT SERPL-MCNC: 5.8 G/DL — LOW (ref 6–8)
PROT SERPL-MCNC: 5.8 G/DL — LOW (ref 6–8)
PROT SERPL-MCNC: 5.9 G/DL — LOW (ref 6–8)
PROT SERPL-MCNC: 6.1 G/DL — SIGNIFICANT CHANGE UP (ref 6–8)
PROT SERPL-MCNC: 6.4 G/DL — SIGNIFICANT CHANGE UP (ref 6–8)
PROT SERPL-MCNC: 6.4 G/DL — SIGNIFICANT CHANGE UP (ref 6–8)
PROT SERPL-MCNC: 6.5 G/DL — SIGNIFICANT CHANGE UP (ref 6–8)
PROT UR-MCNC: ABNORMAL
PROTHROM AB SERPL-ACNC: 11.8 SEC — SIGNIFICANT CHANGE UP (ref 9.95–12.87)
RBC # BLD: 3.9 M/UL — LOW (ref 4.2–5.4)
RBC # BLD: 3.95 M/UL — LOW (ref 4.2–5.4)
RBC # BLD: 4.06 M/UL — LOW (ref 4.2–5.4)
RBC # BLD: 4.09 M/UL — LOW (ref 4.2–5.4)
RBC # BLD: 4.16 M/UL — LOW (ref 4.2–5.4)
RBC # BLD: 4.18 M/UL — LOW (ref 4.2–5.4)
RBC # BLD: 4.24 M/UL — SIGNIFICANT CHANGE UP (ref 4.2–5.4)
RBC # BLD: 4.24 M/UL — SIGNIFICANT CHANGE UP (ref 4.2–5.4)
RBC # BLD: 4.26 M/UL — SIGNIFICANT CHANGE UP (ref 4.2–5.4)
RBC # BLD: 4.3 M/UL — SIGNIFICANT CHANGE UP (ref 4.2–5.4)
RBC # BLD: 4.3 M/UL — SIGNIFICANT CHANGE UP (ref 4.2–5.4)
RBC # BLD: 4.33 M/UL — SIGNIFICANT CHANGE UP (ref 4.2–5.4)
RBC # BLD: 4.36 M/UL — SIGNIFICANT CHANGE UP (ref 4.2–5.4)
RBC # BLD: 4.38 M/UL — SIGNIFICANT CHANGE UP (ref 4.2–5.4)
RBC # BLD: 4.4 M/UL — SIGNIFICANT CHANGE UP (ref 4.2–5.4)
RBC # BLD: 4.55 M/UL — SIGNIFICANT CHANGE UP (ref 4.2–5.4)
RBC # FLD: 12.8 % — SIGNIFICANT CHANGE UP (ref 11.5–14.5)
RBC # FLD: 12.8 % — SIGNIFICANT CHANGE UP (ref 11.5–14.5)
RBC # FLD: 13.1 % — SIGNIFICANT CHANGE UP (ref 11.5–14.5)
RBC # FLD: 13.2 % — SIGNIFICANT CHANGE UP (ref 11.5–14.5)
RBC # FLD: 13.2 % — SIGNIFICANT CHANGE UP (ref 11.5–14.5)
RBC # FLD: 13.3 % — SIGNIFICANT CHANGE UP (ref 11.5–14.5)
RBC # FLD: 13.4 % — SIGNIFICANT CHANGE UP (ref 11.5–14.5)
RBC # FLD: 13.4 % — SIGNIFICANT CHANGE UP (ref 11.5–14.5)
RBC # FLD: 13.6 % — SIGNIFICANT CHANGE UP (ref 11.5–14.5)
RBC # FLD: 13.7 % — SIGNIFICANT CHANGE UP (ref 11.5–14.5)
RBC # FLD: 13.8 % — SIGNIFICANT CHANGE UP (ref 11.5–14.5)
RBC # FLD: 13.8 % — SIGNIFICANT CHANGE UP (ref 11.5–14.5)
RBC # FLD: 14 % — SIGNIFICANT CHANGE UP (ref 11.5–14.5)
RBC # FLD: 14 % — SIGNIFICANT CHANGE UP (ref 11.5–14.5)
RBC CASTS # UR COMP ASSIST: 6 /HPF — HIGH (ref 0–4)
S PNEUM AG UR QL: NEGATIVE — SIGNIFICANT CHANGE UP
SAO2 % BLDA: 90 % — LOW (ref 94–98)
SAO2 % BLDV: 42 % — SIGNIFICANT CHANGE UP
SARS-COV-2 IGG+IGM SERPL QL IA: 0.4 U/ML — SIGNIFICANT CHANGE UP
SARS-COV-2 IGG+IGM SERPL QL IA: NEGATIVE — SIGNIFICANT CHANGE UP
SARS-COV-2 RNA SPEC QL NAA+PROBE: DETECTED
SODIUM SERPL-SCNC: 124 MMOL/L — LOW (ref 135–146)
SODIUM SERPL-SCNC: 130 MMOL/L — LOW (ref 135–146)
SODIUM SERPL-SCNC: 133 MMOL/L — LOW (ref 135–146)
SODIUM SERPL-SCNC: 134 MMOL/L — LOW (ref 135–146)
SODIUM SERPL-SCNC: 135 MMOL/L — SIGNIFICANT CHANGE UP (ref 135–146)
SODIUM SERPL-SCNC: 137 MMOL/L — SIGNIFICANT CHANGE UP (ref 135–146)
SODIUM SERPL-SCNC: 138 MMOL/L — SIGNIFICANT CHANGE UP (ref 135–146)
SODIUM SERPL-SCNC: 139 MMOL/L — SIGNIFICANT CHANGE UP (ref 135–146)
SODIUM SERPL-SCNC: 140 MMOL/L — SIGNIFICANT CHANGE UP (ref 135–146)
SODIUM SERPL-SCNC: 140 MMOL/L — SIGNIFICANT CHANGE UP (ref 135–146)
SODIUM SERPL-SCNC: 141 MMOL/L — SIGNIFICANT CHANGE UP (ref 135–146)
SODIUM SERPL-SCNC: 144 MMOL/L — SIGNIFICANT CHANGE UP (ref 135–146)
SP GR SPEC: 1.03 — SIGNIFICANT CHANGE UP (ref 1.01–1.03)
SPECIMEN SOURCE: SIGNIFICANT CHANGE UP
SURGICAL PATHOLOGY STUDY: SIGNIFICANT CHANGE UP
SURGICAL PATHOLOGY STUDY: SIGNIFICANT CHANGE UP
T4 FREE SERPL-MCNC: 1.5 NG/DL — SIGNIFICANT CHANGE UP (ref 0.9–1.8)
T4 FREE+ TSH PNL SERPL: 0.08 UIU/ML — LOW (ref 0.27–4.2)
TRIGL SERPL-MCNC: 186 MG/DL — HIGH
TSH SERPL-MCNC: 0.11 UIU/ML — LOW (ref 0.27–4.2)
UROBILINOGEN FLD QL: ABNORMAL
WBC # BLD: 12.83 K/UL — HIGH (ref 4.8–10.8)
WBC # BLD: 12.87 K/UL — HIGH (ref 4.8–10.8)
WBC # BLD: 14.11 K/UL — HIGH (ref 4.8–10.8)
WBC # BLD: 14.45 K/UL — HIGH (ref 4.8–10.8)
WBC # BLD: 14.5 K/UL — HIGH (ref 4.8–10.8)
WBC # BLD: 14.73 K/UL — HIGH (ref 4.8–10.8)
WBC # BLD: 14.95 K/UL — HIGH (ref 4.8–10.8)
WBC # BLD: 15.45 K/UL — HIGH (ref 4.8–10.8)
WBC # BLD: 15.9 K/UL — HIGH (ref 4.8–10.8)
WBC # BLD: 16.57 K/UL — HIGH (ref 4.8–10.8)
WBC # BLD: 18.07 K/UL — HIGH (ref 4.8–10.8)
WBC # BLD: 5.34 K/UL — SIGNIFICANT CHANGE UP (ref 4.8–10.8)
WBC # BLD: 5.7 K/UL — SIGNIFICANT CHANGE UP (ref 4.8–10.8)
WBC # BLD: 6.66 K/UL — SIGNIFICANT CHANGE UP (ref 4.8–10.8)
WBC # BLD: 7.09 K/UL — SIGNIFICANT CHANGE UP (ref 4.8–10.8)
WBC # BLD: 9.71 K/UL — SIGNIFICANT CHANGE UP (ref 4.8–10.8)
WBC # FLD AUTO: 12.83 K/UL — HIGH (ref 4.8–10.8)
WBC # FLD AUTO: 12.87 K/UL — HIGH (ref 4.8–10.8)
WBC # FLD AUTO: 14.11 K/UL — HIGH (ref 4.8–10.8)
WBC # FLD AUTO: 14.45 K/UL — HIGH (ref 4.8–10.8)
WBC # FLD AUTO: 14.5 K/UL — HIGH (ref 4.8–10.8)
WBC # FLD AUTO: 14.73 K/UL — HIGH (ref 4.8–10.8)
WBC # FLD AUTO: 14.95 K/UL — HIGH (ref 4.8–10.8)
WBC # FLD AUTO: 15.45 K/UL — HIGH (ref 4.8–10.8)
WBC # FLD AUTO: 15.9 K/UL — HIGH (ref 4.8–10.8)
WBC # FLD AUTO: 16.57 K/UL — HIGH (ref 4.8–10.8)
WBC # FLD AUTO: 18.07 K/UL — HIGH (ref 4.8–10.8)
WBC # FLD AUTO: 5.34 K/UL — SIGNIFICANT CHANGE UP (ref 4.8–10.8)
WBC # FLD AUTO: 5.7 K/UL — SIGNIFICANT CHANGE UP (ref 4.8–10.8)
WBC # FLD AUTO: 6.66 K/UL — SIGNIFICANT CHANGE UP (ref 4.8–10.8)
WBC # FLD AUTO: 7.09 K/UL — SIGNIFICANT CHANGE UP (ref 4.8–10.8)
WBC # FLD AUTO: 9.71 K/UL — SIGNIFICANT CHANGE UP (ref 4.8–10.8)
WBC UR QL: 2 /HPF — SIGNIFICANT CHANGE UP (ref 0–5)

## 2021-01-01 PROCEDURE — 99233 SBSQ HOSP IP/OBS HIGH 50: CPT

## 2021-01-01 PROCEDURE — 93010 ELECTROCARDIOGRAM REPORT: CPT

## 2021-01-01 PROCEDURE — 74018 RADEX ABDOMEN 1 VIEW: CPT | Mod: 26

## 2021-01-01 PROCEDURE — 71045 X-RAY EXAM CHEST 1 VIEW: CPT | Mod: 26

## 2021-01-01 PROCEDURE — 99232 SBSQ HOSP IP/OBS MODERATE 35: CPT

## 2021-01-01 PROCEDURE — 93010 ELECTROCARDIOGRAM REPORT: CPT | Mod: 77

## 2021-01-01 PROCEDURE — 88305 TISSUE EXAM BY PATHOLOGIST: CPT | Mod: 26

## 2021-01-01 PROCEDURE — 93970 EXTREMITY STUDY: CPT | Mod: 26

## 2021-01-01 PROCEDURE — 45380 COLONOSCOPY AND BIOPSY: CPT

## 2021-01-01 PROCEDURE — 99285 EMERGENCY DEPT VISIT HI MDM: CPT

## 2021-01-01 PROCEDURE — 43239 EGD BIOPSY SINGLE/MULTIPLE: CPT | Mod: XU

## 2021-01-01 PROCEDURE — 88312 SPECIAL STAINS GROUP 1: CPT | Mod: 26

## 2021-01-01 PROCEDURE — 99443: CPT

## 2021-01-01 PROCEDURE — 88342 IMHCHEM/IMCYTCHM 1ST ANTB: CPT | Mod: 26

## 2021-01-01 PROCEDURE — 93971 EXTREMITY STUDY: CPT | Mod: 26,RT

## 2021-01-01 PROCEDURE — 99223 1ST HOSP IP/OBS HIGH 75: CPT | Mod: GC

## 2021-01-01 PROCEDURE — 43255 EGD CONTROL BLEEDING ANY: CPT | Mod: XS

## 2021-01-01 PROCEDURE — 99222 1ST HOSP IP/OBS MODERATE 55: CPT

## 2021-01-01 PROCEDURE — 88341 IMHCHEM/IMCYTCHM EA ADD ANTB: CPT | Mod: 26

## 2021-01-01 PROCEDURE — 88313 SPECIAL STAINS GROUP 2: CPT | Mod: 26

## 2021-01-01 RX ORDER — KETOROLAC TROMETHAMINE 30 MG/ML
15 SYRINGE (ML) INJECTION ONCE
Refills: 0 | Status: DISCONTINUED | OUTPATIENT
Start: 2021-01-01 | End: 2021-01-01

## 2021-01-01 RX ORDER — POTASSIUM CHLORIDE 20 MEQ
20 PACKET (EA) ORAL ONCE
Refills: 0 | Status: COMPLETED | OUTPATIENT
Start: 2021-01-01 | End: 2021-01-01

## 2021-01-01 RX ORDER — ASPIRIN/CALCIUM CARB/MAGNESIUM 324 MG
81 TABLET ORAL DAILY
Refills: 0 | Status: DISCONTINUED | OUTPATIENT
Start: 2021-01-01 | End: 2021-01-01

## 2021-01-01 RX ORDER — FUROSEMIDE 40 MG
40 TABLET ORAL DAILY
Refills: 0 | Status: DISCONTINUED | OUTPATIENT
Start: 2021-01-01 | End: 2021-01-01

## 2021-01-01 RX ORDER — INSULIN LISPRO 100/ML
8 VIAL (ML) SUBCUTANEOUS
Refills: 0 | Status: DISCONTINUED | OUTPATIENT
Start: 2021-01-01 | End: 2021-01-01

## 2021-01-01 RX ORDER — DEXAMETHASONE 0.5 MG/5ML
6 ELIXIR ORAL DAILY
Refills: 0 | Status: DISCONTINUED | OUTPATIENT
Start: 2021-01-01 | End: 2021-01-01

## 2021-01-01 RX ORDER — INSULIN GLARGINE 100 [IU]/ML
24 INJECTION, SOLUTION SUBCUTANEOUS AT BEDTIME
Refills: 0 | Status: DISCONTINUED | OUTPATIENT
Start: 2021-01-01 | End: 2021-01-01

## 2021-01-01 RX ORDER — MORPHINE SULFATE 50 MG/1
2 CAPSULE, EXTENDED RELEASE ORAL ONCE
Refills: 0 | Status: DISCONTINUED | OUTPATIENT
Start: 2021-01-01 | End: 2021-01-01

## 2021-01-01 RX ORDER — REMDESIVIR 5 MG/ML
INJECTION INTRAVENOUS
Refills: 0 | Status: COMPLETED | OUTPATIENT
Start: 2021-01-01 | End: 2021-01-01

## 2021-01-01 RX ORDER — ENOXAPARIN SODIUM 100 MG/ML
90 INJECTION SUBCUTANEOUS
Refills: 0 | Status: DISCONTINUED | OUTPATIENT
Start: 2021-01-01 | End: 2021-01-01

## 2021-01-01 RX ORDER — MORPHINE SULFATE 50 MG/1
1 CAPSULE, EXTENDED RELEASE ORAL ONCE
Refills: 0 | Status: DISCONTINUED | OUTPATIENT
Start: 2021-01-01 | End: 2021-01-01

## 2021-01-01 RX ORDER — LEVOTHYROXINE SODIUM 125 MCG
1 TABLET ORAL
Qty: 0 | Refills: 0 | DISCHARGE

## 2021-01-01 RX ORDER — MAGNESIUM SULFATE 500 MG/ML
2 VIAL (ML) INJECTION ONCE
Refills: 0 | Status: COMPLETED | OUTPATIENT
Start: 2021-01-01 | End: 2021-01-01

## 2021-01-01 RX ORDER — NIFEDIPINE 30 MG
30 TABLET, EXTENDED RELEASE 24 HR ORAL DAILY
Refills: 0 | Status: DISCONTINUED | OUTPATIENT
Start: 2021-01-01 | End: 2021-01-01

## 2021-01-01 RX ORDER — TOCILIZUMAB 20 MG/ML
800 INJECTION, SOLUTION, CONCENTRATE INTRAVENOUS ONCE
Refills: 0 | Status: COMPLETED | OUTPATIENT
Start: 2021-01-01 | End: 2021-01-01

## 2021-01-01 RX ORDER — DEXTROSE 50 % IN WATER 50 %
12.5 SYRINGE (ML) INTRAVENOUS ONCE
Refills: 0 | Status: DISCONTINUED | OUTPATIENT
Start: 2021-01-01 | End: 2021-01-01

## 2021-01-01 RX ORDER — NYSTATIN CREAM 100000 [USP'U]/G
1 CREAM TOPICAL
Refills: 0 | Status: DISCONTINUED | OUTPATIENT
Start: 2021-01-01 | End: 2021-01-01

## 2021-01-01 RX ORDER — SODIUM CHLORIDE 9 MG/ML
500 INJECTION, SOLUTION INTRAVENOUS ONCE
Refills: 0 | Status: COMPLETED | OUTPATIENT
Start: 2021-01-01 | End: 2021-01-01

## 2021-01-01 RX ORDER — ALBUTEROL 90 UG/1
2 AEROSOL, METERED ORAL EVERY 6 HOURS
Refills: 0 | Status: DISCONTINUED | OUTPATIENT
Start: 2021-01-01 | End: 2021-01-01

## 2021-01-01 RX ORDER — INSULIN GLARGINE 100 [IU]/ML
23 INJECTION, SOLUTION SUBCUTANEOUS AT BEDTIME
Refills: 0 | Status: DISCONTINUED | OUTPATIENT
Start: 2021-01-01 | End: 2021-01-01

## 2021-01-01 RX ORDER — CEFTRIAXONE 500 MG/1
1000 INJECTION, POWDER, FOR SOLUTION INTRAMUSCULAR; INTRAVENOUS EVERY 24 HOURS
Refills: 0 | Status: COMPLETED | OUTPATIENT
Start: 2021-01-01 | End: 2021-01-01

## 2021-01-01 RX ORDER — GLUCAGON INJECTION, SOLUTION 0.5 MG/.1ML
1 INJECTION, SOLUTION SUBCUTANEOUS ONCE
Refills: 0 | Status: DISCONTINUED | OUTPATIENT
Start: 2021-01-01 | End: 2021-01-01

## 2021-01-01 RX ORDER — SODIUM CHLORIDE 9 MG/ML
1000 INJECTION INTRAMUSCULAR; INTRAVENOUS; SUBCUTANEOUS ONCE
Refills: 0 | Status: COMPLETED | OUTPATIENT
Start: 2021-01-01 | End: 2021-01-01

## 2021-01-01 RX ORDER — TOCILIZUMAB 20 MG/ML
730 INJECTION, SOLUTION, CONCENTRATE INTRAVENOUS ONCE
Refills: 0 | Status: DISCONTINUED | OUTPATIENT
Start: 2021-01-01 | End: 2021-01-01

## 2021-01-01 RX ORDER — PANTOPRAZOLE SODIUM 20 MG/1
40 TABLET, DELAYED RELEASE ORAL
Refills: 0 | Status: DISCONTINUED | OUTPATIENT
Start: 2021-01-01 | End: 2021-01-01

## 2021-01-01 RX ORDER — REMDESIVIR 5 MG/ML
100 INJECTION INTRAVENOUS EVERY 24 HOURS
Refills: 0 | Status: COMPLETED | OUTPATIENT
Start: 2021-01-01 | End: 2021-01-01

## 2021-01-01 RX ORDER — LABETALOL HCL 100 MG
10 TABLET ORAL ONCE
Refills: 0 | Status: DISCONTINUED | OUTPATIENT
Start: 2021-01-01 | End: 2021-01-01

## 2021-01-01 RX ORDER — METFORMIN HYDROCHLORIDE 850 MG/1
1 TABLET ORAL
Qty: 0 | Refills: 0 | DISCHARGE

## 2021-01-01 RX ORDER — CEFTRIAXONE 500 MG/1
1000 INJECTION, POWDER, FOR SOLUTION INTRAMUSCULAR; INTRAVENOUS ONCE
Refills: 0 | Status: COMPLETED | OUTPATIENT
Start: 2021-01-01 | End: 2021-01-01

## 2021-01-01 RX ORDER — SODIUM CHLORIDE 9 MG/ML
1000 INJECTION, SOLUTION INTRAVENOUS
Refills: 0 | Status: DISCONTINUED | OUTPATIENT
Start: 2021-01-01 | End: 2021-01-01

## 2021-01-01 RX ORDER — ELECTROLYTE SOLUTION,INJ
1 VIAL (ML) INTRAVENOUS
Refills: 0 | Status: DISCONTINUED | OUTPATIENT
Start: 2021-01-01 | End: 2021-01-01

## 2021-01-01 RX ORDER — DEXAMETHASONE 0.5 MG/5ML
6 ELIXIR ORAL EVERY 12 HOURS
Refills: 0 | Status: DISCONTINUED | OUTPATIENT
Start: 2021-01-01 | End: 2021-01-01

## 2021-01-01 RX ORDER — DEXTROSE 50 % IN WATER 50 %
15 SYRINGE (ML) INTRAVENOUS ONCE
Refills: 0 | Status: DISCONTINUED | OUTPATIENT
Start: 2021-01-01 | End: 2021-01-01

## 2021-01-01 RX ORDER — ALBUTEROL 90 UG/1
2 AEROSOL, METERED ORAL
Qty: 0 | Refills: 0 | DISCHARGE

## 2021-01-01 RX ORDER — TOCILIZUMAB 20 MG/ML
750 INJECTION, SOLUTION, CONCENTRATE INTRAVENOUS ONCE
Refills: 0 | Status: DISCONTINUED | OUTPATIENT
Start: 2021-01-01 | End: 2021-01-01

## 2021-01-01 RX ORDER — AZITHROMYCIN 500 MG/1
500 TABLET, FILM COATED ORAL ONCE
Refills: 0 | Status: COMPLETED | OUTPATIENT
Start: 2021-01-01 | End: 2021-01-01

## 2021-01-01 RX ORDER — LABETALOL HCL 100 MG
5 TABLET ORAL ONCE
Refills: 0 | Status: COMPLETED | OUTPATIENT
Start: 2021-01-01 | End: 2021-01-01

## 2021-01-01 RX ORDER — INSULIN GLARGINE 100 [IU]/ML
16 INJECTION, SOLUTION SUBCUTANEOUS EVERY MORNING
Refills: 0 | Status: DISCONTINUED | OUTPATIENT
Start: 2021-01-01 | End: 2021-01-01

## 2021-01-01 RX ORDER — REMDESIVIR 5 MG/ML
200 INJECTION INTRAVENOUS EVERY 24 HOURS
Refills: 0 | Status: COMPLETED | OUTPATIENT
Start: 2021-01-01 | End: 2021-01-01

## 2021-01-01 RX ORDER — ASPIRIN/CALCIUM CARB/MAGNESIUM 324 MG
1 TABLET ORAL
Qty: 0 | Refills: 0 | DISCHARGE

## 2021-01-01 RX ORDER — I.V. FAT EMULSION 20 G/100ML
0.82 EMULSION INTRAVENOUS
Qty: 75 | Refills: 0 | Status: DISCONTINUED | OUTPATIENT
Start: 2021-01-01 | End: 2021-01-01

## 2021-01-01 RX ORDER — INSULIN LISPRO 100/ML
VIAL (ML) SUBCUTANEOUS
Refills: 0 | Status: DISCONTINUED | OUTPATIENT
Start: 2021-01-01 | End: 2021-01-01

## 2021-01-01 RX ORDER — OMEPRAZOLE 10 MG/1
1 CAPSULE, DELAYED RELEASE ORAL
Qty: 60 | Refills: 2
Start: 2021-01-01 | End: 2021-10-13

## 2021-01-01 RX ORDER — ACETAMINOPHEN 500 MG
650 TABLET ORAL EVERY 6 HOURS
Refills: 0 | Status: DISCONTINUED | OUTPATIENT
Start: 2021-01-01 | End: 2021-01-01

## 2021-01-01 RX ORDER — LANOLIN ALCOHOL/MO/W.PET/CERES
5 CREAM (GRAM) TOPICAL AT BEDTIME
Refills: 0 | Status: DISCONTINUED | OUTPATIENT
Start: 2021-01-01 | End: 2021-01-01

## 2021-01-01 RX ORDER — PANTOPRAZOLE SODIUM 20 MG/1
40 TABLET, DELAYED RELEASE ORAL EVERY 12 HOURS
Refills: 0 | Status: DISCONTINUED | OUTPATIENT
Start: 2021-01-01 | End: 2021-01-01

## 2021-01-01 RX ORDER — INSULIN LISPRO 100/ML
6 VIAL (ML) SUBCUTANEOUS
Refills: 0 | Status: DISCONTINUED | OUTPATIENT
Start: 2021-01-01 | End: 2021-01-01

## 2021-01-01 RX ORDER — MIDAZOLAM HYDROCHLORIDE 1 MG/ML
0.02 INJECTION, SOLUTION INTRAMUSCULAR; INTRAVENOUS
Qty: 100 | Refills: 0 | Status: DISCONTINUED | OUTPATIENT
Start: 2021-01-01 | End: 2021-01-01

## 2021-01-01 RX ORDER — ACETAMINOPHEN 500 MG
975 TABLET ORAL ONCE
Refills: 0 | Status: COMPLETED | OUTPATIENT
Start: 2021-01-01 | End: 2021-01-01

## 2021-01-01 RX ORDER — GUAIFENESIN/DEXTROMETHORPHAN 600MG-30MG
10 TABLET, EXTENDED RELEASE 12 HR ORAL EVERY 6 HOURS
Refills: 0 | Status: DISCONTINUED | OUTPATIENT
Start: 2021-01-01 | End: 2021-01-01

## 2021-01-01 RX ORDER — MONTELUKAST 4 MG/1
1 TABLET, CHEWABLE ORAL
Qty: 0 | Refills: 0 | DISCHARGE

## 2021-01-01 RX ORDER — SENNA PLUS 8.6 MG/1
2 TABLET ORAL AT BEDTIME
Refills: 0 | Status: DISCONTINUED | OUTPATIENT
Start: 2021-01-01 | End: 2021-01-01

## 2021-01-01 RX ORDER — LABETALOL HCL 100 MG
10 TABLET ORAL ONCE
Refills: 0 | Status: COMPLETED | OUTPATIENT
Start: 2021-01-01 | End: 2021-01-01

## 2021-01-01 RX ORDER — DEXTROSE 50 % IN WATER 50 %
25 SYRINGE (ML) INTRAVENOUS ONCE
Refills: 0 | Status: DISCONTINUED | OUTPATIENT
Start: 2021-01-01 | End: 2021-01-01

## 2021-01-01 RX ORDER — POLYETHYLENE GLYCOL 3350 17 G/17G
17 POWDER, FOR SOLUTION ORAL
Refills: 0 | Status: DISCONTINUED | OUTPATIENT
Start: 2021-01-01 | End: 2021-01-01

## 2021-01-01 RX ORDER — LOSARTAN/HYDROCHLOROTHIAZIDE 100MG-25MG
1 TABLET ORAL
Qty: 0 | Refills: 0 | DISCHARGE

## 2021-01-01 RX ORDER — INSULIN LISPRO 100/ML
5 VIAL (ML) SUBCUTANEOUS
Refills: 0 | Status: DISCONTINUED | OUTPATIENT
Start: 2021-01-01 | End: 2021-01-01

## 2021-01-01 RX ORDER — LOSARTAN POTASSIUM 100 MG/1
100 TABLET, FILM COATED ORAL DAILY
Refills: 0 | Status: DISCONTINUED | OUTPATIENT
Start: 2021-01-01 | End: 2021-01-01

## 2021-01-01 RX ORDER — NOREPINEPHRINE BITARTRATE/D5W 8 MG/250ML
0.05 PLASTIC BAG, INJECTION (ML) INTRAVENOUS
Qty: 8 | Refills: 0 | Status: DISCONTINUED | OUTPATIENT
Start: 2021-01-01 | End: 2021-01-01

## 2021-01-01 RX ORDER — AZITHROMYCIN 500 MG/1
TABLET, FILM COATED ORAL
Refills: 0 | Status: DISCONTINUED | OUTPATIENT
Start: 2021-01-01 | End: 2021-01-01

## 2021-01-01 RX ORDER — ONDANSETRON 8 MG/1
4 TABLET, FILM COATED ORAL EVERY 6 HOURS
Refills: 0 | Status: DISCONTINUED | OUTPATIENT
Start: 2021-01-01 | End: 2021-01-01

## 2021-01-01 RX ORDER — INSULIN GLARGINE 100 [IU]/ML
20 INJECTION, SOLUTION SUBCUTANEOUS EVERY MORNING
Refills: 0 | Status: DISCONTINUED | OUTPATIENT
Start: 2021-01-01 | End: 2021-01-01

## 2021-01-01 RX ORDER — LEVOTHYROXINE SODIUM 125 MCG
137 TABLET ORAL DAILY
Refills: 0 | Status: DISCONTINUED | OUTPATIENT
Start: 2021-01-01 | End: 2021-01-01

## 2021-01-01 RX ORDER — SODIUM CHLORIDE 9 MG/ML
500 INJECTION INTRAMUSCULAR; INTRAVENOUS; SUBCUTANEOUS ONCE
Refills: 0 | Status: COMPLETED | OUTPATIENT
Start: 2021-01-01 | End: 2021-01-01

## 2021-01-01 RX ORDER — FENTANYL CITRATE 50 UG/ML
0.5 INJECTION INTRAVENOUS
Qty: 2500 | Refills: 0 | Status: DISCONTINUED | OUTPATIENT
Start: 2021-01-01 | End: 2021-01-01

## 2021-01-01 RX ORDER — DIPHENHYDRAMINE HCL 50 MG
25 CAPSULE ORAL ONCE
Refills: 0 | Status: COMPLETED | OUTPATIENT
Start: 2021-01-01 | End: 2021-01-01

## 2021-01-01 RX ORDER — MONTELUKAST 4 MG/1
10 TABLET, CHEWABLE ORAL DAILY
Refills: 0 | Status: DISCONTINUED | OUTPATIENT
Start: 2021-01-01 | End: 2021-01-01

## 2021-01-01 RX ORDER — MAGNESIUM OXIDE 400 MG ORAL TABLET 241.3 MG
400 TABLET ORAL
Refills: 0 | Status: COMPLETED | OUTPATIENT
Start: 2021-01-01 | End: 2021-01-01

## 2021-01-01 RX ORDER — SODIUM CHLORIDE 9 MG/ML
1000 INJECTION, SOLUTION INTRAVENOUS ONCE
Refills: 0 | Status: COMPLETED | OUTPATIENT
Start: 2021-01-01 | End: 2021-01-01

## 2021-01-01 RX ORDER — SODIUM SULFATE, POTASSIUM SULFATE, MAGNESIUM SULFATE 17.5; 3.13; 1.6 G/ML; G/ML; G/ML
17.5-3.13-1.6 SOLUTION, CONCENTRATE ORAL
Qty: 1 | Refills: 0 | Status: ACTIVE | COMMUNITY
Start: 2021-01-01 | End: 1900-01-01

## 2021-01-01 RX ORDER — ACETAMINOPHEN 500 MG
1000 TABLET ORAL ONCE
Refills: 0 | Status: COMPLETED | OUTPATIENT
Start: 2021-01-01 | End: 2021-01-01

## 2021-01-01 RX ORDER — AZITHROMYCIN 500 MG/1
500 TABLET, FILM COATED ORAL EVERY 24 HOURS
Refills: 0 | Status: DISCONTINUED | OUTPATIENT
Start: 2021-01-01 | End: 2021-01-01

## 2021-01-01 RX ORDER — CEFTRIAXONE 500 MG/1
INJECTION, POWDER, FOR SOLUTION INTRAMUSCULAR; INTRAVENOUS
Refills: 0 | Status: COMPLETED | OUTPATIENT
Start: 2021-01-01 | End: 2021-01-01

## 2021-01-01 RX ORDER — ENOXAPARIN SODIUM 100 MG/ML
40 INJECTION SUBCUTANEOUS
Refills: 0 | Status: DISCONTINUED | OUTPATIENT
Start: 2021-01-01 | End: 2021-01-01

## 2021-01-01 RX ADMIN — ENOXAPARIN SODIUM 40 MILLIGRAM(S): 100 INJECTION SUBCUTANEOUS at 17:53

## 2021-01-01 RX ADMIN — ENOXAPARIN SODIUM 40 MILLIGRAM(S): 100 INJECTION SUBCUTANEOUS at 17:30

## 2021-01-01 RX ADMIN — ENOXAPARIN SODIUM 90 MILLIGRAM(S): 100 INJECTION SUBCUTANEOUS at 16:56

## 2021-01-01 RX ADMIN — ALBUTEROL 2 PUFF(S): 90 AEROSOL, METERED ORAL at 20:53

## 2021-01-01 RX ADMIN — Medication 81 MILLIGRAM(S): at 12:07

## 2021-01-01 RX ADMIN — Medication 6 MILLIGRAM(S): at 17:08

## 2021-01-01 RX ADMIN — Medication 1 EACH: at 22:04

## 2021-01-01 RX ADMIN — Medication 6 MILLIGRAM(S): at 17:50

## 2021-01-01 RX ADMIN — Medication 1000 MILLIGRAM(S): at 01:15

## 2021-01-01 RX ADMIN — INSULIN GLARGINE 16 UNIT(S): 100 INJECTION, SOLUTION SUBCUTANEOUS at 08:34

## 2021-01-01 RX ADMIN — Medication 6 UNIT(S): at 11:59

## 2021-01-01 RX ADMIN — LOSARTAN POTASSIUM 100 MILLIGRAM(S): 100 TABLET, FILM COATED ORAL at 05:38

## 2021-01-01 RX ADMIN — Medication 137 MICROGRAM(S): at 05:38

## 2021-01-01 RX ADMIN — Medication 1 APPLICATION(S): at 06:18

## 2021-01-01 RX ADMIN — Medication 1 APPLICATION(S): at 06:00

## 2021-01-01 RX ADMIN — Medication 5 MILLIGRAM(S): at 21:12

## 2021-01-01 RX ADMIN — Medication 137 MICROGRAM(S): at 06:27

## 2021-01-01 RX ADMIN — ENOXAPARIN SODIUM 40 MILLIGRAM(S): 100 INJECTION SUBCUTANEOUS at 17:23

## 2021-01-01 RX ADMIN — Medication 6 MILLIGRAM(S): at 06:24

## 2021-01-01 RX ADMIN — Medication 81 MILLIGRAM(S): at 11:39

## 2021-01-01 RX ADMIN — Medication 1 EACH: at 21:02

## 2021-01-01 RX ADMIN — ENOXAPARIN SODIUM 90 MILLIGRAM(S): 100 INJECTION SUBCUTANEOUS at 05:39

## 2021-01-01 RX ADMIN — Medication 650 MILLIGRAM(S): at 21:01

## 2021-01-01 RX ADMIN — Medication 1: at 08:48

## 2021-01-01 RX ADMIN — I.V. FAT EMULSION 31.3 GM/KG/DAY: 20 EMULSION INTRAVENOUS at 22:04

## 2021-01-01 RX ADMIN — MAGNESIUM OXIDE 400 MG ORAL TABLET 400 MILLIGRAM(S): 241.3 TABLET ORAL at 11:49

## 2021-01-01 RX ADMIN — Medication 10 MILLILITER(S): at 21:12

## 2021-01-01 RX ADMIN — Medication 5 MILLIGRAM(S): at 21:01

## 2021-01-01 RX ADMIN — I.V. FAT EMULSION 31.3 GM/KG/DAY: 20 EMULSION INTRAVENOUS at 21:45

## 2021-01-01 RX ADMIN — Medication 50 GRAM(S): at 12:44

## 2021-01-01 RX ADMIN — I.V. FAT EMULSION 31.3 GM/KG/DAY: 20 EMULSION INTRAVENOUS at 21:03

## 2021-01-01 RX ADMIN — Medication 6 MILLIGRAM(S): at 17:10

## 2021-01-01 RX ADMIN — Medication 1: at 12:08

## 2021-01-01 RX ADMIN — Medication 6 MILLIGRAM(S): at 12:20

## 2021-01-01 RX ADMIN — PANTOPRAZOLE SODIUM 40 MILLIGRAM(S): 20 TABLET, DELAYED RELEASE ORAL at 17:08

## 2021-01-01 RX ADMIN — Medication 5 MILLIGRAM(S): at 06:20

## 2021-01-01 RX ADMIN — INSULIN GLARGINE 16 UNIT(S): 100 INJECTION, SOLUTION SUBCUTANEOUS at 09:27

## 2021-01-01 RX ADMIN — INSULIN GLARGINE 24 UNIT(S): 100 INJECTION, SOLUTION SUBCUTANEOUS at 22:04

## 2021-01-01 RX ADMIN — Medication 975 MILLIGRAM(S): at 21:36

## 2021-01-01 RX ADMIN — Medication 0.5 MILLIGRAM(S): at 03:52

## 2021-01-01 RX ADMIN — I.V. FAT EMULSION 31.3 GM/KG/DAY: 20 EMULSION INTRAVENOUS at 20:02

## 2021-01-01 RX ADMIN — LOSARTAN POTASSIUM 100 MILLIGRAM(S): 100 TABLET, FILM COATED ORAL at 18:54

## 2021-01-01 RX ADMIN — SENNA PLUS 2 TABLET(S): 8.6 TABLET ORAL at 21:10

## 2021-01-01 RX ADMIN — MONTELUKAST 10 MILLIGRAM(S): 4 TABLET, CHEWABLE ORAL at 12:08

## 2021-01-01 RX ADMIN — Medication 1: at 11:26

## 2021-01-01 RX ADMIN — Medication 5 UNIT(S): at 12:08

## 2021-01-01 RX ADMIN — Medication 2: at 07:50

## 2021-01-01 RX ADMIN — Medication 40 MILLIGRAM(S): at 06:24

## 2021-01-01 RX ADMIN — ENOXAPARIN SODIUM 90 MILLIGRAM(S): 100 INJECTION SUBCUTANEOUS at 07:36

## 2021-01-01 RX ADMIN — PANTOPRAZOLE SODIUM 40 MILLIGRAM(S): 20 TABLET, DELAYED RELEASE ORAL at 16:55

## 2021-01-01 RX ADMIN — Medication 1 APPLICATION(S): at 14:00

## 2021-01-01 RX ADMIN — ENOXAPARIN SODIUM 90 MILLIGRAM(S): 100 INJECTION SUBCUTANEOUS at 05:14

## 2021-01-01 RX ADMIN — ENOXAPARIN SODIUM 40 MILLIGRAM(S): 100 INJECTION SUBCUTANEOUS at 05:04

## 2021-01-01 RX ADMIN — Medication 137 MICROGRAM(S): at 06:28

## 2021-01-01 RX ADMIN — Medication 5 UNIT(S): at 11:58

## 2021-01-01 RX ADMIN — Medication 50 GRAM(S): at 08:27

## 2021-01-01 RX ADMIN — Medication 1 APPLICATION(S): at 21:18

## 2021-01-01 RX ADMIN — INSULIN GLARGINE 23 UNIT(S): 100 INJECTION, SOLUTION SUBCUTANEOUS at 21:02

## 2021-01-01 RX ADMIN — CEFTRIAXONE 100 MILLIGRAM(S): 500 INJECTION, POWDER, FOR SOLUTION INTRAMUSCULAR; INTRAVENOUS at 23:04

## 2021-01-01 RX ADMIN — Medication 81 MILLIGRAM(S): at 11:49

## 2021-01-01 RX ADMIN — Medication 1 APPLICATION(S): at 14:15

## 2021-01-01 RX ADMIN — Medication 0.5 MILLIGRAM(S): at 05:38

## 2021-01-01 RX ADMIN — MONTELUKAST 10 MILLIGRAM(S): 4 TABLET, CHEWABLE ORAL at 12:07

## 2021-01-01 RX ADMIN — PANTOPRAZOLE SODIUM 40 MILLIGRAM(S): 20 TABLET, DELAYED RELEASE ORAL at 05:15

## 2021-01-01 RX ADMIN — Medication 5 MILLIGRAM(S): at 21:31

## 2021-01-01 RX ADMIN — Medication 6 MILLIGRAM(S): at 05:15

## 2021-01-01 RX ADMIN — SENNA PLUS 2 TABLET(S): 8.6 TABLET ORAL at 21:12

## 2021-01-01 RX ADMIN — Medication 6 MILLIGRAM(S): at 05:34

## 2021-01-01 RX ADMIN — Medication 1 APPLICATION(S): at 13:52

## 2021-01-01 RX ADMIN — Medication 81 MILLIGRAM(S): at 10:27

## 2021-01-01 RX ADMIN — Medication 0.5 MILLIGRAM(S): at 10:14

## 2021-01-01 RX ADMIN — INSULIN GLARGINE 16 UNIT(S): 100 INJECTION, SOLUTION SUBCUTANEOUS at 08:43

## 2021-01-01 RX ADMIN — Medication 600 MILLIGRAM(S): at 05:56

## 2021-01-01 RX ADMIN — INSULIN GLARGINE 16 UNIT(S): 100 INJECTION, SOLUTION SUBCUTANEOUS at 08:00

## 2021-01-01 RX ADMIN — Medication 0.5 MILLIGRAM(S): at 22:04

## 2021-01-01 RX ADMIN — REMDESIVIR 500 MILLIGRAM(S): 5 INJECTION INTRAVENOUS at 05:09

## 2021-01-01 RX ADMIN — Medication 0.5 MILLIGRAM(S): at 21:04

## 2021-01-01 RX ADMIN — Medication 15 MILLIGRAM(S): at 03:18

## 2021-01-01 RX ADMIN — Medication 6 MILLIGRAM(S): at 05:39

## 2021-01-01 RX ADMIN — Medication 2: at 17:10

## 2021-01-01 RX ADMIN — Medication 650 MILLIGRAM(S): at 20:29

## 2021-01-01 RX ADMIN — Medication 5 UNIT(S): at 12:27

## 2021-01-01 RX ADMIN — Medication 6 MILLIGRAM(S): at 06:27

## 2021-01-01 RX ADMIN — AZITHROMYCIN 255 MILLIGRAM(S): 500 TABLET, FILM COATED ORAL at 21:03

## 2021-01-01 RX ADMIN — REMDESIVIR 500 MILLIGRAM(S): 5 INJECTION INTRAVENOUS at 05:18

## 2021-01-01 RX ADMIN — Medication 1 APPLICATION(S): at 21:02

## 2021-01-01 RX ADMIN — Medication 81 MILLIGRAM(S): at 09:16

## 2021-01-01 RX ADMIN — SENNA PLUS 2 TABLET(S): 8.6 TABLET ORAL at 21:31

## 2021-01-01 RX ADMIN — Medication 2: at 11:16

## 2021-01-01 RX ADMIN — Medication 137 MICROGRAM(S): at 05:18

## 2021-01-01 RX ADMIN — MONTELUKAST 10 MILLIGRAM(S): 4 TABLET, CHEWABLE ORAL at 13:12

## 2021-01-01 RX ADMIN — Medication 2: at 12:07

## 2021-01-01 RX ADMIN — Medication 600 MILLIGRAM(S): at 05:03

## 2021-01-01 RX ADMIN — Medication 81 MILLIGRAM(S): at 12:08

## 2021-01-01 RX ADMIN — ENOXAPARIN SODIUM 40 MILLIGRAM(S): 100 INJECTION SUBCUTANEOUS at 17:08

## 2021-01-01 RX ADMIN — Medication 30 MILLIGRAM(S): at 16:10

## 2021-01-01 RX ADMIN — Medication 6 UNIT(S): at 08:22

## 2021-01-01 RX ADMIN — Medication 5 UNIT(S): at 17:10

## 2021-01-01 RX ADMIN — Medication 25 GRAM(S): at 14:40

## 2021-01-01 RX ADMIN — SENNA PLUS 2 TABLET(S): 8.6 TABLET ORAL at 21:02

## 2021-01-01 RX ADMIN — ENOXAPARIN SODIUM 40 MILLIGRAM(S): 100 INJECTION SUBCUTANEOUS at 16:21

## 2021-01-01 RX ADMIN — Medication 1: at 08:35

## 2021-01-01 RX ADMIN — REMDESIVIR 500 MILLIGRAM(S): 5 INJECTION INTRAVENOUS at 05:57

## 2021-01-01 RX ADMIN — MONTELUKAST 10 MILLIGRAM(S): 4 TABLET, CHEWABLE ORAL at 11:06

## 2021-01-01 RX ADMIN — SENNA PLUS 2 TABLET(S): 8.6 TABLET ORAL at 21:44

## 2021-01-01 RX ADMIN — AZITHROMYCIN 255 MILLIGRAM(S): 500 TABLET, FILM COATED ORAL at 21:09

## 2021-01-01 RX ADMIN — Medication 1: at 12:27

## 2021-01-01 RX ADMIN — Medication 6 MILLIGRAM(S): at 16:21

## 2021-01-01 RX ADMIN — Medication 6 MILLIGRAM(S): at 05:32

## 2021-01-01 RX ADMIN — Medication 1 APPLICATION(S): at 18:10

## 2021-01-01 RX ADMIN — Medication 5 MILLIGRAM(S): at 21:10

## 2021-01-01 RX ADMIN — Medication 40 MILLIGRAM(S): at 06:28

## 2021-01-01 RX ADMIN — Medication 0.5 MILLIGRAM(S): at 00:42

## 2021-01-01 RX ADMIN — Medication 25 MILLIGRAM(S): at 02:44

## 2021-01-01 RX ADMIN — Medication 5 UNIT(S): at 07:50

## 2021-01-01 RX ADMIN — Medication 5 UNIT(S): at 17:08

## 2021-01-01 RX ADMIN — Medication 50 MILLIEQUIVALENT(S): at 22:39

## 2021-01-01 RX ADMIN — LOSARTAN POTASSIUM 100 MILLIGRAM(S): 100 TABLET, FILM COATED ORAL at 06:25

## 2021-01-01 RX ADMIN — Medication 1 EACH: at 21:31

## 2021-01-01 RX ADMIN — Medication 600 MILLIGRAM(S): at 05:18

## 2021-01-01 RX ADMIN — Medication 137 MICROGRAM(S): at 05:15

## 2021-01-01 RX ADMIN — Medication 5 UNIT(S): at 11:15

## 2021-01-01 RX ADMIN — Medication 6 MILLIGRAM(S): at 17:29

## 2021-01-01 RX ADMIN — Medication 1 MILLIGRAM(S): at 23:05

## 2021-01-01 RX ADMIN — Medication 6 MILLIGRAM(S): at 05:17

## 2021-01-01 RX ADMIN — Medication 40 MILLIGRAM(S): at 05:14

## 2021-01-01 RX ADMIN — Medication 600 MILLIGRAM(S): at 05:09

## 2021-01-01 RX ADMIN — CEFTRIAXONE 100 MILLIGRAM(S): 500 INJECTION, POWDER, FOR SOLUTION INTRAMUSCULAR; INTRAVENOUS at 21:03

## 2021-01-01 RX ADMIN — Medication 5 UNIT(S): at 08:34

## 2021-01-01 RX ADMIN — SODIUM CHLORIDE 1000 MILLILITER(S): 9 INJECTION, SOLUTION INTRAVENOUS at 22:29

## 2021-01-01 RX ADMIN — CEFTRIAXONE 100 MILLIGRAM(S): 500 INJECTION, POWDER, FOR SOLUTION INTRAMUSCULAR; INTRAVENOUS at 21:08

## 2021-01-01 RX ADMIN — Medication 81 MILLIGRAM(S): at 10:57

## 2021-01-01 RX ADMIN — Medication 1 APPLICATION(S): at 21:33

## 2021-01-01 RX ADMIN — INSULIN GLARGINE 16 UNIT(S): 100 INJECTION, SOLUTION SUBCUTANEOUS at 11:10

## 2021-01-01 RX ADMIN — AZITHROMYCIN 255 MILLIGRAM(S): 500 TABLET, FILM COATED ORAL at 21:08

## 2021-01-01 RX ADMIN — Medication 5 UNIT(S): at 17:57

## 2021-01-01 RX ADMIN — Medication 5 UNIT(S): at 08:02

## 2021-01-01 RX ADMIN — Medication 40 MILLIGRAM(S): at 05:39

## 2021-01-01 RX ADMIN — Medication 10 MILLIGRAM(S): at 10:27

## 2021-01-01 RX ADMIN — Medication 137 MICROGRAM(S): at 05:34

## 2021-01-01 RX ADMIN — Medication 5 UNIT(S): at 07:59

## 2021-01-01 RX ADMIN — Medication 137 MICROGRAM(S): at 07:27

## 2021-01-01 RX ADMIN — CEFTRIAXONE 100 MILLIGRAM(S): 500 INJECTION, POWDER, FOR SOLUTION INTRAMUSCULAR; INTRAVENOUS at 21:11

## 2021-01-01 RX ADMIN — PANTOPRAZOLE SODIUM 40 MILLIGRAM(S): 20 TABLET, DELAYED RELEASE ORAL at 06:24

## 2021-01-01 RX ADMIN — INSULIN GLARGINE 24 UNIT(S): 100 INJECTION, SOLUTION SUBCUTANEOUS at 21:22

## 2021-01-01 RX ADMIN — INSULIN GLARGINE 20 UNIT(S): 100 INJECTION, SOLUTION SUBCUTANEOUS at 07:28

## 2021-01-01 RX ADMIN — Medication 5 MILLIGRAM(S): at 21:03

## 2021-01-01 RX ADMIN — Medication 137 MICROGRAM(S): at 05:03

## 2021-01-01 RX ADMIN — Medication 5 UNIT(S): at 17:45

## 2021-01-01 RX ADMIN — SENNA PLUS 2 TABLET(S): 8.6 TABLET ORAL at 21:45

## 2021-01-01 RX ADMIN — CEFTRIAXONE 100 MILLIGRAM(S): 500 INJECTION, POWDER, FOR SOLUTION INTRAMUSCULAR; INTRAVENOUS at 21:13

## 2021-01-01 RX ADMIN — SODIUM CHLORIDE 1000 MILLILITER(S): 9 INJECTION INTRAMUSCULAR; INTRAVENOUS; SUBCUTANEOUS at 22:39

## 2021-01-01 RX ADMIN — SODIUM CHLORIDE 2000 MILLILITER(S): 9 INJECTION INTRAMUSCULAR; INTRAVENOUS; SUBCUTANEOUS at 06:00

## 2021-01-01 RX ADMIN — Medication 1 APPLICATION(S): at 05:37

## 2021-01-01 RX ADMIN — Medication 5 MILLIGRAM(S): at 21:02

## 2021-01-01 RX ADMIN — TOCILIZUMAB 100 MILLIGRAM(S): 20 INJECTION, SOLUTION, CONCENTRATE INTRAVENOUS at 01:18

## 2021-01-01 RX ADMIN — Medication 1: at 18:06

## 2021-01-01 RX ADMIN — Medication 2: at 08:02

## 2021-01-01 RX ADMIN — ENOXAPARIN SODIUM 90 MILLIGRAM(S): 100 INJECTION SUBCUTANEOUS at 17:28

## 2021-01-01 RX ADMIN — AZITHROMYCIN 255 MILLIGRAM(S): 500 TABLET, FILM COATED ORAL at 21:21

## 2021-01-01 RX ADMIN — Medication 3: at 12:26

## 2021-01-01 RX ADMIN — ENOXAPARIN SODIUM 40 MILLIGRAM(S): 100 INJECTION SUBCUTANEOUS at 05:35

## 2021-01-01 RX ADMIN — Medication 81 MILLIGRAM(S): at 13:12

## 2021-01-01 RX ADMIN — Medication 25 GRAM(S): at 12:11

## 2021-01-01 RX ADMIN — Medication 5 MILLIGRAM(S): at 21:44

## 2021-01-01 RX ADMIN — Medication 6 MILLIGRAM(S): at 17:09

## 2021-01-01 RX ADMIN — I.V. FAT EMULSION 31.3 GM/KG/DAY: 20 EMULSION INTRAVENOUS at 21:44

## 2021-01-01 RX ADMIN — Medication 6 MILLIGRAM(S): at 17:16

## 2021-01-01 RX ADMIN — PANTOPRAZOLE SODIUM 40 MILLIGRAM(S): 20 TABLET, DELAYED RELEASE ORAL at 17:33

## 2021-01-01 RX ADMIN — Medication 50 GRAM(S): at 07:29

## 2021-01-01 RX ADMIN — Medication 2: at 08:48

## 2021-01-01 RX ADMIN — ENOXAPARIN SODIUM 40 MILLIGRAM(S): 100 INJECTION SUBCUTANEOUS at 05:15

## 2021-01-01 RX ADMIN — Medication 5 UNIT(S): at 11:50

## 2021-01-01 RX ADMIN — Medication 6 MILLIGRAM(S): at 05:56

## 2021-01-01 RX ADMIN — Medication 1 APPLICATION(S): at 21:45

## 2021-01-01 RX ADMIN — Medication 2: at 11:50

## 2021-01-01 RX ADMIN — Medication 1: at 08:31

## 2021-01-01 RX ADMIN — ENOXAPARIN SODIUM 40 MILLIGRAM(S): 100 INJECTION SUBCUTANEOUS at 05:17

## 2021-01-01 RX ADMIN — ENOXAPARIN SODIUM 40 MILLIGRAM(S): 100 INJECTION SUBCUTANEOUS at 17:50

## 2021-01-01 RX ADMIN — SODIUM CHLORIDE 500 MILLILITER(S): 9 INJECTION, SOLUTION INTRAVENOUS at 02:30

## 2021-01-01 RX ADMIN — REMDESIVIR 500 MILLIGRAM(S): 5 INJECTION INTRAVENOUS at 05:32

## 2021-01-01 RX ADMIN — Medication 3: at 12:08

## 2021-01-01 RX ADMIN — Medication 10 MILLILITER(S): at 12:21

## 2021-01-01 RX ADMIN — PANTOPRAZOLE SODIUM 40 MILLIGRAM(S): 20 TABLET, DELAYED RELEASE ORAL at 17:45

## 2021-01-01 RX ADMIN — Medication 6 MILLIGRAM(S): at 05:14

## 2021-01-01 RX ADMIN — MORPHINE SULFATE 1 MILLIGRAM(S): 50 CAPSULE, EXTENDED RELEASE ORAL at 05:12

## 2021-01-01 RX ADMIN — Medication 8 UNIT(S): at 11:50

## 2021-01-01 RX ADMIN — ENOXAPARIN SODIUM 40 MILLIGRAM(S): 100 INJECTION SUBCUTANEOUS at 05:32

## 2021-01-01 RX ADMIN — LOSARTAN POTASSIUM 100 MILLIGRAM(S): 100 TABLET, FILM COATED ORAL at 05:15

## 2021-01-01 RX ADMIN — Medication 600 MILLIGRAM(S): at 16:21

## 2021-01-01 RX ADMIN — ALBUTEROL 2 PUFF(S): 90 AEROSOL, METERED ORAL at 06:12

## 2021-01-01 RX ADMIN — Medication 5 UNIT(S): at 08:45

## 2021-01-01 RX ADMIN — Medication 8 UNIT(S): at 16:08

## 2021-01-01 RX ADMIN — I.V. FAT EMULSION 31.3 GM/KG/DAY: 20 EMULSION INTRAVENOUS at 21:31

## 2021-01-01 RX ADMIN — Medication 5 UNIT(S): at 11:26

## 2021-01-01 RX ADMIN — REMDESIVIR 500 MILLIGRAM(S): 5 INJECTION INTRAVENOUS at 05:39

## 2021-01-01 RX ADMIN — Medication 5 UNIT(S): at 18:37

## 2021-01-01 RX ADMIN — Medication 137 MICROGRAM(S): at 06:24

## 2021-01-01 RX ADMIN — Medication 1 APPLICATION(S): at 16:06

## 2021-01-01 RX ADMIN — Medication 81 MILLIGRAM(S): at 12:11

## 2021-01-01 RX ADMIN — MONTELUKAST 10 MILLIGRAM(S): 4 TABLET, CHEWABLE ORAL at 08:20

## 2021-01-01 RX ADMIN — ALBUTEROL 2 PUFF(S): 90 AEROSOL, METERED ORAL at 06:32

## 2021-01-01 RX ADMIN — PANTOPRAZOLE SODIUM 40 MILLIGRAM(S): 20 TABLET, DELAYED RELEASE ORAL at 17:25

## 2021-01-01 RX ADMIN — Medication 1 APPLICATION(S): at 15:46

## 2021-01-01 RX ADMIN — MONTELUKAST 10 MILLIGRAM(S): 4 TABLET, CHEWABLE ORAL at 12:20

## 2021-01-01 RX ADMIN — Medication 600 MILLIGRAM(S): at 05:34

## 2021-01-01 RX ADMIN — Medication 25 GRAM(S): at 11:00

## 2021-01-01 RX ADMIN — AZITHROMYCIN 255 MILLIGRAM(S): 500 TABLET, FILM COATED ORAL at 21:13

## 2021-01-01 RX ADMIN — ENOXAPARIN SODIUM 40 MILLIGRAM(S): 100 INJECTION SUBCUTANEOUS at 17:09

## 2021-01-01 RX ADMIN — Medication 5 MILLIGRAM(S): at 02:30

## 2021-01-01 RX ADMIN — Medication 5 UNIT(S): at 09:27

## 2021-01-01 RX ADMIN — Medication 600 MILLIGRAM(S): at 05:32

## 2021-01-01 RX ADMIN — Medication 81 MILLIGRAM(S): at 11:50

## 2021-01-01 RX ADMIN — Medication 0.5 MILLIGRAM(S): at 13:04

## 2021-01-01 RX ADMIN — ENOXAPARIN SODIUM 90 MILLIGRAM(S): 100 INJECTION SUBCUTANEOUS at 06:28

## 2021-01-01 RX ADMIN — ENOXAPARIN SODIUM 40 MILLIGRAM(S): 100 INJECTION SUBCUTANEOUS at 05:09

## 2021-01-01 RX ADMIN — Medication 5 MILLIGRAM(S): at 21:45

## 2021-01-01 RX ADMIN — Medication 6 MILLIGRAM(S): at 05:09

## 2021-01-01 RX ADMIN — Medication 6 UNIT(S): at 17:10

## 2021-01-01 RX ADMIN — Medication 6 MILLIGRAM(S): at 05:03

## 2021-01-01 RX ADMIN — Medication 1 APPLICATION(S): at 21:44

## 2021-01-01 RX ADMIN — Medication 137 MICROGRAM(S): at 05:56

## 2021-01-01 RX ADMIN — Medication 600 MILLIGRAM(S): at 17:09

## 2021-01-01 RX ADMIN — Medication 8 UNIT(S): at 08:02

## 2021-01-01 RX ADMIN — Medication 70 EACH: at 21:45

## 2021-01-01 RX ADMIN — MONTELUKAST 10 MILLIGRAM(S): 4 TABLET, CHEWABLE ORAL at 11:39

## 2021-01-01 RX ADMIN — Medication 1 APPLICATION(S): at 19:30

## 2021-01-01 RX ADMIN — INSULIN GLARGINE 16 UNIT(S): 100 INJECTION, SOLUTION SUBCUTANEOUS at 09:15

## 2021-01-01 RX ADMIN — ENOXAPARIN SODIUM 40 MILLIGRAM(S): 100 INJECTION SUBCUTANEOUS at 05:56

## 2021-01-01 RX ADMIN — Medication 6 MILLIGRAM(S): at 23:59

## 2021-01-01 RX ADMIN — ALBUTEROL 2 PUFF(S): 90 AEROSOL, METERED ORAL at 17:08

## 2021-01-01 RX ADMIN — ENOXAPARIN SODIUM 90 MILLIGRAM(S): 100 INJECTION SUBCUTANEOUS at 17:11

## 2021-01-01 RX ADMIN — Medication 0: at 17:53

## 2021-01-01 RX ADMIN — Medication 1: at 16:19

## 2021-01-01 RX ADMIN — Medication 0.5 MILLIGRAM(S): at 23:31

## 2021-01-01 RX ADMIN — Medication 600 MILLIGRAM(S): at 00:24

## 2021-01-01 RX ADMIN — CEFTRIAXONE 100 MILLIGRAM(S): 500 INJECTION, POWDER, FOR SOLUTION INTRAMUSCULAR; INTRAVENOUS at 21:09

## 2021-01-01 RX ADMIN — LOSARTAN POTASSIUM 100 MILLIGRAM(S): 100 TABLET, FILM COATED ORAL at 05:03

## 2021-01-01 RX ADMIN — Medication 1 APPLICATION(S): at 06:29

## 2021-01-01 RX ADMIN — MONTELUKAST 10 MILLIGRAM(S): 4 TABLET, CHEWABLE ORAL at 10:27

## 2021-01-01 RX ADMIN — Medication 1 APPLICATION(S): at 06:39

## 2021-01-01 RX ADMIN — Medication 81 MILLIGRAM(S): at 11:07

## 2021-01-01 RX ADMIN — Medication 81 MILLIGRAM(S): at 08:20

## 2021-01-01 RX ADMIN — ENOXAPARIN SODIUM 90 MILLIGRAM(S): 100 INJECTION SUBCUTANEOUS at 06:25

## 2021-01-01 RX ADMIN — Medication 5 UNIT(S): at 10:57

## 2021-01-01 RX ADMIN — ENOXAPARIN SODIUM 90 MILLIGRAM(S): 100 INJECTION SUBCUTANEOUS at 17:08

## 2021-01-01 RX ADMIN — Medication 1 APPLICATION(S): at 05:12

## 2021-01-01 RX ADMIN — INSULIN GLARGINE 16 UNIT(S): 100 INJECTION, SOLUTION SUBCUTANEOUS at 08:02

## 2021-01-01 RX ADMIN — Medication 10 MILLILITER(S): at 06:00

## 2021-01-01 RX ADMIN — Medication 137 MICROGRAM(S): at 05:09

## 2021-01-01 RX ADMIN — MONTELUKAST 10 MILLIGRAM(S): 4 TABLET, CHEWABLE ORAL at 11:50

## 2021-01-01 RX ADMIN — Medication 1: at 06:39

## 2021-01-01 RX ADMIN — ALBUTEROL 2 PUFF(S): 90 AEROSOL, METERED ORAL at 20:43

## 2021-01-01 RX ADMIN — Medication 6 MILLIGRAM(S): at 17:28

## 2021-01-01 RX ADMIN — MONTELUKAST 10 MILLIGRAM(S): 4 TABLET, CHEWABLE ORAL at 11:49

## 2021-01-01 RX ADMIN — Medication 8 UNIT(S): at 17:16

## 2021-01-01 RX ADMIN — PANTOPRAZOLE SODIUM 40 MILLIGRAM(S): 20 TABLET, DELAYED RELEASE ORAL at 16:21

## 2021-01-01 RX ADMIN — Medication 137 MICROGRAM(S): at 05:32

## 2021-01-01 RX ADMIN — Medication 137 MICROGRAM(S): at 05:39

## 2021-01-01 RX ADMIN — PANTOPRAZOLE SODIUM 40 MILLIGRAM(S): 20 TABLET, DELAYED RELEASE ORAL at 17:54

## 2021-01-01 RX ADMIN — Medication 650 MILLIGRAM(S): at 01:56

## 2021-01-01 RX ADMIN — Medication 400 MILLIGRAM(S): at 00:47

## 2021-01-01 RX ADMIN — ENOXAPARIN SODIUM 40 MILLIGRAM(S): 100 INJECTION SUBCUTANEOUS at 17:45

## 2021-01-01 RX ADMIN — ENOXAPARIN SODIUM 90 MILLIGRAM(S): 100 INJECTION SUBCUTANEOUS at 17:16

## 2021-01-01 RX ADMIN — Medication 1 APPLICATION(S): at 22:49

## 2021-01-01 RX ADMIN — Medication 5 UNIT(S): at 08:35

## 2021-01-01 RX ADMIN — MONTELUKAST 10 MILLIGRAM(S): 4 TABLET, CHEWABLE ORAL at 09:16

## 2021-01-01 RX ADMIN — Medication 6 MILLIGRAM(S): at 16:55

## 2021-01-01 RX ADMIN — ENOXAPARIN SODIUM 90 MILLIGRAM(S): 100 INJECTION SUBCUTANEOUS at 17:26

## 2021-01-01 RX ADMIN — SENNA PLUS 2 TABLET(S): 8.6 TABLET ORAL at 21:01

## 2021-01-01 RX ADMIN — MAGNESIUM OXIDE 400 MG ORAL TABLET 400 MILLIGRAM(S): 241.3 TABLET ORAL at 17:08

## 2021-01-01 RX ADMIN — NYSTATIN CREAM 1 APPLICATION(S): 100000 CREAM TOPICAL at 23:47

## 2021-01-01 RX ADMIN — MORPHINE SULFATE 1 MILLIGRAM(S): 50 CAPSULE, EXTENDED RELEASE ORAL at 04:44

## 2021-01-01 RX ADMIN — Medication 1 APPLICATION(S): at 23:04

## 2021-01-01 RX ADMIN — Medication 6 MILLIGRAM(S): at 17:27

## 2021-01-01 RX ADMIN — Medication 600 MILLIGRAM(S): at 17:30

## 2021-01-01 RX ADMIN — PANTOPRAZOLE SODIUM 40 MILLIGRAM(S): 20 TABLET, DELAYED RELEASE ORAL at 05:04

## 2021-01-01 RX ADMIN — SENNA PLUS 2 TABLET(S): 8.6 TABLET ORAL at 21:08

## 2021-01-01 RX ADMIN — Medication 1: at 17:09

## 2021-01-01 RX ADMIN — LOSARTAN POTASSIUM 100 MILLIGRAM(S): 100 TABLET, FILM COATED ORAL at 06:27

## 2021-01-01 RX ADMIN — Medication 2: at 16:41

## 2021-01-01 RX ADMIN — PANTOPRAZOLE SODIUM 40 MILLIGRAM(S): 20 TABLET, DELAYED RELEASE ORAL at 05:34

## 2021-01-01 RX ADMIN — ENOXAPARIN SODIUM 90 MILLIGRAM(S): 100 INJECTION SUBCUTANEOUS at 05:38

## 2021-01-01 RX ADMIN — MONTELUKAST 10 MILLIGRAM(S): 4 TABLET, CHEWABLE ORAL at 10:57

## 2021-01-01 RX ADMIN — PANTOPRAZOLE SODIUM 40 MILLIGRAM(S): 20 TABLET, DELAYED RELEASE ORAL at 05:18

## 2021-01-01 RX ADMIN — Medication 1: at 07:34

## 2021-01-01 RX ADMIN — ALBUTEROL 2 PUFF(S): 90 AEROSOL, METERED ORAL at 09:04

## 2021-01-01 RX ADMIN — Medication 5 UNIT(S): at 16:18

## 2021-01-01 RX ADMIN — MORPHINE SULFATE 2 MILLIGRAM(S): 50 CAPSULE, EXTENDED RELEASE ORAL at 04:14

## 2021-01-01 RX ADMIN — AZITHROMYCIN 255 MILLIGRAM(S): 500 TABLET, FILM COATED ORAL at 21:11

## 2021-01-01 RX ADMIN — ALBUTEROL 2 PUFF(S): 90 AEROSOL, METERED ORAL at 08:56

## 2021-01-01 RX ADMIN — LOSARTAN POTASSIUM 100 MILLIGRAM(S): 100 TABLET, FILM COATED ORAL at 07:27

## 2021-01-01 RX ADMIN — AZITHROMYCIN 255 MILLIGRAM(S): 500 TABLET, FILM COATED ORAL at 23:06

## 2021-01-01 RX ADMIN — Medication 25 GRAM(S): at 10:58

## 2021-01-01 RX ADMIN — Medication 5 MILLIGRAM(S): at 21:08

## 2021-01-01 RX ADMIN — Medication 3: at 10:57

## 2021-01-01 RX ADMIN — Medication 600 MILLIGRAM(S): at 17:08

## 2021-01-01 RX ADMIN — Medication 10 MILLIGRAM(S): at 21:33

## 2021-01-01 RX ADMIN — Medication 2: at 17:57

## 2021-01-01 RX ADMIN — Medication 50 GRAM(S): at 11:40

## 2021-01-01 RX ADMIN — ENOXAPARIN SODIUM 40 MILLIGRAM(S): 100 INJECTION SUBCUTANEOUS at 05:39

## 2021-01-01 RX ADMIN — INSULIN GLARGINE 16 UNIT(S): 100 INJECTION, SOLUTION SUBCUTANEOUS at 08:31

## 2021-01-01 RX ADMIN — PANTOPRAZOLE SODIUM 40 MILLIGRAM(S): 20 TABLET, DELAYED RELEASE ORAL at 05:32

## 2021-01-01 RX ADMIN — Medication 1: at 07:59

## 2021-01-01 RX ADMIN — Medication 3: at 11:59

## 2021-01-01 RX ADMIN — ENOXAPARIN SODIUM 90 MILLIGRAM(S): 100 INJECTION SUBCUTANEOUS at 17:33

## 2021-01-01 RX ADMIN — SENNA PLUS 2 TABLET(S): 8.6 TABLET ORAL at 21:05

## 2021-01-01 RX ADMIN — LOSARTAN POTASSIUM 100 MILLIGRAM(S): 100 TABLET, FILM COATED ORAL at 06:28

## 2021-05-05 NOTE — HISTORY OF PRESENT ILLNESS
[Home] : at home, [unfilled] , at the time of the visit. [Medical Office: ([facility])___] : at the medical office located in  [Verbal consent obtained from patient] : the patient, [unfilled] [de-identified] : Patient is a 67 y/o with PMHX of HTN, pre-DM, whom was referred by ED for abdominal pain that occurred in August. Pain was located in right lower quadrant with radiation to back. Pain since resolved. Patient requests EGD and Colon as she also has GERD.

## 2021-05-05 NOTE — ASSESSMENT
[FreeTextEntry1] : Patient is a 65 y/o with PMHX of HTN, pre-DM, whom was referred by ED for abdominal pain that occurred in August. Pain was located in right lower quadrant with radiation to back. Pain since resolved. Patient requests EGD and Colon as she also has GERD. \par \par Setup EGD and Colonoscopy\par Follow up after\par On follow up will order MRI to check prior noted Liver lesion

## 2021-07-16 NOTE — CHART NOTE - NSCHARTNOTEFT_GEN_A_CORE
PACU ANESTHESIA ADMISSION NOTE      Procedure:   Post op diagnosis:      ____  Intubated  TV:______       Rate: ______      FiO2: ______    __x__  Patent Airway    __x__  Full return of protective reflexes    _x___  Full recovery from anesthesia / back to baseline     Vitals:   T:           R:11                  BP: 112/88                 Sat:   100                P: 83      Mental Status:  _x___ Awake   __x___ Alert   _____ Drowsy   _____ Sedated    Nausea/Vomiting:  ___x_ NO  ______Yes,   See Post - Op Orders          Pain Scale (0-10):  x_____    Treatment: ____ None    ____ See Post - Op/PCA Orders    Post - Operative Fluids:   _x___ Oral   ____ See Post - Op Orders    Plan: Discharge:   __x__Home       _____Floor     _____Critical Care    _____  Other:_________________    Comments: tolerated procedure well

## 2021-07-16 NOTE — H&P PST ADULT - NSICDXPASTMEDICALHX_GEN_ALL_CORE_FT
PAST MEDICAL HISTORY:  Acute gout, unspecified cause, unspecified site     Essential hypertension     Gastroesophageal reflux disease without esophagitis     Hypothyroidism, unspecified type     Type 2 diabetes mellitus without complication, without long-term current use of insulin

## 2021-07-16 NOTE — ASU PATIENT PROFILE, ADULT - PMH
Acute gout, unspecified cause, unspecified site    Essential hypertension    Gastroesophageal reflux disease without esophagitis    Hypothyroidism, unspecified type    Type 2 diabetes mellitus without complication, without long-term current use of insulin

## 2021-07-29 PROBLEM — K25.9 GASTRIC ULCER: Status: ACTIVE | Noted: 2021-01-01

## 2021-07-29 PROBLEM — K62.1 HYPERPLASTIC RECTAL POLYP: Status: ACTIVE | Noted: 2021-01-01

## 2021-07-29 PROBLEM — R12 HEARTBURN: Status: ACTIVE | Noted: 2020-01-01

## 2021-07-29 PROBLEM — N39.46 MIXED INCONTINENCE: Status: ACTIVE | Noted: 2019-06-27

## 2021-07-29 PROBLEM — K20.90 ESOPHAGITIS: Status: ACTIVE | Noted: 2021-01-01

## 2021-07-29 PROBLEM — K57.30 DIVERTICULA, COLON: Status: ACTIVE | Noted: 2021-01-01

## 2021-07-29 PROBLEM — K64.8 HEMORRHOIDS, INTERNAL: Status: ACTIVE | Noted: 2021-01-01

## 2021-07-29 PROBLEM — K76.9 LIVER LESION: Status: ACTIVE | Noted: 2020-01-01

## 2021-07-29 PROBLEM — Z87.440 HISTORY OF UTI: Status: ACTIVE | Noted: 2019-06-27

## 2021-07-29 PROBLEM — N95.2 ATROPHIC VAGINITIS: Status: ACTIVE | Noted: 2019-06-27

## 2021-07-29 PROBLEM — R74.8 ABNORMAL LIVER ENZYMES: Status: ACTIVE | Noted: 2020-01-01

## 2021-07-29 PROBLEM — K63.5 COLON POLYPS: Status: ACTIVE | Noted: 2021-01-01

## 2021-07-29 PROBLEM — R10.9 ABDOMINAL PAIN: Status: ACTIVE | Noted: 2020-01-01

## 2021-08-03 NOTE — ASSESSMENT
[FreeTextEntry1] :  65 y/o with PMHX of HTN, pre-DM and Gerd, whom was referred by ED for abdominal pain that occurred in August 2020. Pain was located in right lower quadrant with radiation to back. Pain since resolved. Patient was last seen in on 5/5/2021 before EGD and Colon procedure which was performed on 7/16/2021. Patient was also asked to have a MRI of abd for right lower quadrant pain.  \par \par \par D/W patient \par Grade C esophagitis\par H. Pylori results pending\par Will need to f/u with MRI of abd\par The importance of twice daily Omerpazole 40mg\par Regeat EGD in 8 weeks for surveillance of gastric Ulcer and esophagtitis\par Repeat colonosopy in 5 years of  Hyperplastic rectal polyp removed.\par H/O increase LFT to follow by PCP.

## 2021-08-03 NOTE — HISTORY OF PRESENT ILLNESS
[Home] : at home, [unfilled] , at the time of the visit. [Medical Office: ([facility])___] : at the medical office located in  [Verbal consent obtained from patient] : the patient, [unfilled] [_________] : Performed [unfilled] [Heartburn] : denies heartburn [Vomiting] : denies vomiting [Diarrhea] : denies diarrhea [Constipation] : denies constipation [Abdominal Pain] : denies abdominal pain [Abdominal Swelling] : denies abdominal swelling [Rectal Pain] : denies rectal pain [Wt Gain ___ Lbs] : no recent weight gain [Wt Loss ___ Lbs] : no recent weight loss [de-identified] : Patient is a 67 y/o with PMHX of HTN, pre-DM and Gerd, whom was referred by ED for abdominal pain that occurred in August 2020. Pain was located in right lower quadrant with radiation to back. Pain since resolved. Patient was last seen in on 5/5/2021 before EGD and Colon procedure which was performed on 7/16/2021. Patient was also asked to have a MRI of abd for right lower quadrant pain.  \par \par \par 7/16/21 : \par EGD Impressions:  Mucosa suggestive of Kc's esophagus biopsy yield, No Kc's    \par Grade C esophagitis    \par Ulcer in the stomach body., biopsy yield,   inflammation no malignancy in this material \par Normal mucosa in the whole examined duodenum. \par \par EGD: 1. Duodenum, biopsy: \par - Fragments of duodenal mucosa, with mild nonspecific chronic inflammation. \par - Normal villous architecture identified (see Comment). \par  Comment: 1. There is no histopathologic evidence of Celiac disease pattern seen in this material. \par 2. Antrum, chronic inflammation and tissue reactive changes, biopsy yield, fragments of squamous type mucosa, with mild nonspecific chronic inflammation and one intraepithelial eosinophil seen in this material,   there is no histopathologic evidence of goblet cells seen in this material.   There is no histologic evidence of distinct gastric type mucosa seen in this material.  \par \par \par Comment: 2. H.Pylori and Giemsa stain is pending.   \par \par Colonoscopy: Prep was good, Diverticulosis of the whole colon, Internal Hemorrhoids, Normal ileum, Rectal 6mm polyp hyperplastic removed.

## 2021-08-07 NOTE — ED PROVIDER NOTE - NS ED ROS FT
Eyes:  No visual changes, eye pain or discharge.  ENMT:  No hearing changes, pain, no sore throat or runny nose, no difficulty swallowing  Cardiac:  No chest pain, or edema. No chest pain with exertion.  Respiratory:  +sob  GI:  No nausea, vomiting, diarrhea or abdominal pain.  :  No dysuria, frequency or burning.  MS:  No myalgia, muscle weakness, joint pain or back pain.  Neuro:  No headache or weakness.  No LOC.  Skin:  No skin rash.   Endocrine: No history of thyroid disease or diabetes.

## 2021-08-07 NOTE — ED ADULT TRIAGE NOTE - CHIEF COMPLAINT QUOTE
Pt tested positive for covid on Monday 8/2, today complaining of increasing sob, and fever tmax 102.3. Pt has low O2 on 6L NC 89%. Pt tested positive for covid on Monday 8/2, today complaining of increasing sob, and fever tmax 102.3. Pt has low O2 on 6L NC 89%. Per EMS, pt O2 at home was 65% on room air.

## 2021-08-07 NOTE — ED ADULT NURSE NOTE - CHIEF COMPLAINT QUOTE
Pt tested positive for covid on Monday 8/2, today complaining of increasing sob, and fever tmax 102.3. Pt has low O2 on 6L NC 89%. Per EMS, pt O2 at home was 65% on room air.

## 2021-08-07 NOTE — ED PROVIDER NOTE - PHYSICAL EXAMINATION
CONSTITUTIONAL: NAD  SKIN: warm, dry  HEAD: Normocephalic; atraumatic.  EYES: PERRL, EOMI, no conjunctival erythema  ENT: No nasal discharge; airway clear.  NECK: Supple; non tender.  CARD: S1, S2 normal; no murmurs, gallops, or rubs. Tachy  RESP: No wheezes, rales or rhonchi.  ABD: soft ntnd  EXT: Normal ROM.  No clubbing, cyanosis or edema.   LYMPH: No acute cervical adenopathy.  NEURO: Alert, oriented, grossly unremarkable  PSYCH: Cooperative, appropriate.

## 2021-08-07 NOTE — ED PROVIDER NOTE - OBJECTIVE STATEMENT
66 y f, pmh of asthma, htn, prediabetes, unvaccinated for covid, pw sob. Pt was tested positive for covid 4 days ago. Pt. at first just felt weak but yesterday started having sob. Today, sob is worse. Pt. sating in the 60s on the field that improved to mid 80s on 15 L. Denies cp, cough, n/v/d, abd pain, dysuria.

## 2021-08-08 PROBLEM — E03.9 HYPOTHYROIDISM, UNSPECIFIED: Chronic | Status: ACTIVE | Noted: 2021-01-01

## 2021-08-08 PROBLEM — K21.9 GASTRO-ESOPHAGEAL REFLUX DISEASE WITHOUT ESOPHAGITIS: Chronic | Status: ACTIVE | Noted: 2021-01-01

## 2021-08-08 PROBLEM — I10 ESSENTIAL (PRIMARY) HYPERTENSION: Chronic | Status: ACTIVE | Noted: 2021-01-01

## 2021-08-08 PROBLEM — M10.9 GOUT, UNSPECIFIED: Chronic | Status: ACTIVE | Noted: 2021-01-01

## 2021-08-08 PROBLEM — E11.9 TYPE 2 DIABETES MELLITUS WITHOUT COMPLICATIONS: Chronic | Status: ACTIVE | Noted: 2021-01-01

## 2021-08-08 NOTE — H&P ADULT - NSHPLABSRESULTS_GEN_ALL_CORE
LABS/RADIOLOGY RESULTS:                          12.3   7.09  )-----------( 361      ( 07 Aug 2021 21:46 )             35.6   08-07    124<L>  |  86<L>  |  12  ----------------------------<  165<H>  3.3<L>   |  23  |  1.5    Ca    8.0<L>      07 Aug 2021 21:46    TPro  6.4  /  Alb  3.6  /  TBili  0.6  /  DBili  x   /  AST  71<H>  /  ALT  60<H>  /  AlkPhos  88  08-07  Blood Cultures    chest xray showing opacities suggestive COVID

## 2021-08-08 NOTE — H&P ADULT - NSHPREVIEWOFSYSTEMS_GEN_ALL_CORE
REVIEW OF SYSTEMS:    CONSTITUTIONAL: generalized weakness, fevers at home and here  EYES/ENT: No visual changes;  No vertigo or throat pain   NECK: No pain or stiffness  RESPIRATORY: No cough, wheezing, hemoptysis; short of breath when not on high flow  CARDIOVASCULAR: No chest pain or palpitations  GASTROINTESTINAL: No abdominal or epigastric pain. Nausea and vomiting at home; Diarrhea for two days at home. No melena or hematochezia.  GENITOURINARY: No dysuria, frequency or hematuria  NEUROLOGICAL: No numbness or weakness  SKIN: No itching, rashes

## 2021-08-08 NOTE — CONSULT NOTE ADULT - SUBJECTIVE AND OBJECTIVE BOX
Patient is a 66y old  Female who presents with a chief complaint of SOB (08 Aug 2021 05:54)      HPI:  66 year old female with history of hypothyroid, pre-diabetes, HTN, and asthma presents with shortness of breath starting this morning.  Patient started feeling fatigued with body aches and diarrhea on Monday and was swabbed positive for COVID at that time.  She started feeling short of breath this morning.  In the field she was found to be saturating in the 60s.  In the ED she improved to 89 on NRB, and is currently comfortable while on high-flow 60% at 60L.  She was not vaccinated stating she was worried about her allergies.    In the ED she received dexamethasone and remdesivir.  Chest xray shows opacities suggestive of COVID.    Triage vitals: /61    RR 22  Temp 102.3  SpO2 85% on 6L (08 Aug 2021 03:04), admitted to floor called to evaluate, cough      PAST MEDICAL & SURGICAL HISTORY:  Type 2 diabetes mellitus without complication, without long-term current use of insulin    Essential hypertension    Acute gout, unspecified cause, unspecified site    Gastroesophageal reflux disease without esophagitis    Hypothyroidism, unspecified type    H/O abdominal hysterectomy    H/O thyroidectomy        SOCIAL HX:   Smoking  -      REVIEW OF SYSTEMS see hpi    Allergies    No Known Allergies    Intolerances        acetaminophen   Tablet .. 650 milliGRAM(s) Oral every 6 hours PRN  ALBUTerol    90 MICROgram(s) HFA Inhaler 2 Puff(s) Inhalation every 6 hours PRN  aspirin  chewable 81 milliGRAM(s) Oral daily  dexAMETHasone  Injectable 6 milliGRAM(s) IV Push daily  enoxaparin Injectable 40 milliGRAM(s) SubCutaneous two times a day  levothyroxine 137 MICROGram(s) Oral daily  montelukast 10 milliGRAM(s) Oral daily  remdesivir  IVPB   IV Intermittent   : Home Meds:      PHYSICAL EXAM    ICU Vital Signs Last 24 Hrs  T(C): 36.2 (08 Aug 2021 04:24), Max: 39.1 (07 Aug 2021 20:53)  T(F): 97.2 (08 Aug 2021 04:24), Max: 102.3 (07 Aug 2021 20:53)  HR: 77 (08 Aug 2021 04:24) (77 - 105)  BP: 127/63 (08 Aug 2021 04:24) (123/78 - 138/61)  BP(mean): 87 (07 Aug 2021 22:30) (87 - 87)  RR: 18 (08 Aug 2021 04:24) (18 - 25)  SpO2: 96% (08 Aug 2021 04:24) (85% - 96%)      General: ill looking  HEENT:  SOCO              Lymph Nodes: No cervical LN   Lungs: Bilateral rhonchi  Cardiovascular: Regular  Abdomen: Soft, Positive BS  Extremities: No clubbing  Skin: Warm  Neurological: Non focal         LABS:                          12.3   7.09  )-----------( 361      ( 07 Aug 2021 21:46 )             35.6                                               08-07    124<L>  |  86<L>  |  12  ----------------------------<  165<H>  3.3<L>   |  23  |  1.5    Ca    8.0<L>      07 Aug 2021 21:46    TPro  6.4  /  Alb  3.6  /  TBili  0.6  /  DBili  x   /  AST  71<H>  /  ALT  60<H>  /  AlkPhos  88  08-07                                                                                           LIVER FUNCTIONS - ( 07 Aug 2021 21:46 )  Alb: 3.6 g/dL / Pro: 6.4 g/dL / ALK PHOS: 88 U/L / ALT: 60 U/L / AST: 71 U/L / GGT: x                                                                                                                                       X-Rays   reviewed    MEDICATIONS  (STANDING):  aspirin  chewable 81 milliGRAM(s) Oral daily  dexAMETHasone  Injectable 6 milliGRAM(s) IV Push daily  enoxaparin Injectable 40 milliGRAM(s) SubCutaneous two times a day  levothyroxine 137 MICROGram(s) Oral daily  montelukast 10 milliGRAM(s) Oral daily  remdesivir  IVPB   IV Intermittent     MEDICATIONS  (PRN):  acetaminophen   Tablet .. 650 milliGRAM(s) Oral every 6 hours PRN Temp greater or equal to 38C (100.4F), Mild Pain (1 - 3)  ALBUTerol    90 MICROgram(s) HFA Inhaler 2 Puff(s) Inhalation every 6 hours PRN Shortness of Breath and/or Wheezing        
  MIRI SUAREZ  66y, Female  Allergy: No Known Allergies      All historical available data reviewed.    HPI:  66 year old female with history of hypothyroid, pre-diabetes, HTN, and asthma presents with shortness of breath starting this morning.  Patient started feeling fatigued with body aches and diarrhea on 8/2 and was swabbed positive for COVID at that time.  She started feeling short of breath this morning.  In the field she was found to be saturating in the 60s.  In the ED she improved to 89 on NRB, and is currently comfortable while on high-flow 60% at 60L.  She was not vaccinated stating she was worried about her allergies.    In the ED she received dexamethasone and remdesivir.  Chest xray shows opacities suggestive of COVID.    Triage vitals: /61    RR 22  Temp 102.3  SpO2 85% on 6L (08 Aug 2021 03:04)    FAMILY HISTORY:    PAST MEDICAL & SURGICAL HISTORY:  Type 2 diabetes mellitus without complication, without long-term current use of insulin    Essential hypertension    Acute gout, unspecified cause, unspecified site    Gastroesophageal reflux disease without esophagitis    Hypothyroidism, unspecified type    H/O abdominal hysterectomy    H/O thyroidectomy          VITALS:  T(F): 97.2, Max: 102.3 (08-07-21 @ 20:53)  HR: 77  BP: 127/63  RR: 18Vital Signs Last 24 Hrs  T(C): 36.2 (08 Aug 2021 04:24), Max: 39.1 (07 Aug 2021 20:53)  T(F): 97.2 (08 Aug 2021 04:24), Max: 102.3 (07 Aug 2021 20:53)  HR: 77 (08 Aug 2021 04:24) (77 - 105)  BP: 127/63 (08 Aug 2021 04:24) (123/78 - 138/61)  BP(mean): 87 (07 Aug 2021 22:30) (87 - 87)  RR: 18 (08 Aug 2021 04:24) (18 - 25)  SpO2: 96% (08 Aug 2021 04:24) (85% - 96%)    TESTS & MEASUREMENTS:                        12.3   7.09  )-----------( 361      ( 07 Aug 2021 21:46 )             35.6     08-07    124<L>  |  86<L>  |  12  ----------------------------<  165<H>  3.3<L>   |  23  |  1.5    Ca    8.0<L>      07 Aug 2021 21:46    TPro  6.4  /  Alb  3.6  /  TBili  0.6  /  DBili  x   /  AST  71<H>  /  ALT  60<H>  /  AlkPhos  88  08-07    LIVER FUNCTIONS - ( 07 Aug 2021 21:46 )  Alb: 3.6 g/dL / Pro: 6.4 g/dL / ALK PHOS: 88 U/L / ALT: 60 U/L / AST: 71 U/L / GGT: x                   RADIOLOGY & ADDITIONAL TESTS:  Personal review of radiological diagnostics performed  Echo and EKG results noted when applicable.     MEDICATIONS:  acetaminophen   Tablet .. 650 milliGRAM(s) Oral every 6 hours PRN  ALBUTerol    90 MICROgram(s) HFA Inhaler 2 Puff(s) Inhalation every 6 hours PRN  aspirin  chewable 81 milliGRAM(s) Oral daily  dexAMETHasone  Injectable 6 milliGRAM(s) IV Push daily  enoxaparin Injectable 40 milliGRAM(s) SubCutaneous two times a day  levothyroxine 137 MICROGram(s) Oral daily  montelukast 10 milliGRAM(s) Oral daily  remdesivir  IVPB   IV Intermittent       ANTIBIOTICS:  remdesivir  IVPB   IV Intermittent

## 2021-08-08 NOTE — CHART NOTE - NSCHARTNOTEFT_GEN_A_CORE
-HPI:    66 year old female with history of hypothyroid, pre-diabetes, HTN, and asthma presents with shortness of breath starting this morning.  Patient started feeling fatigued with body aches and diarrhea on Monday and was swabbed positive for COVID at that time.  She started feeling short of breath this morning.  In the field she was found to be saturating in the 60s.  In the ED she improved to 89 on NRB, and is currently comfortable while on high-flow 60% at 60L.  She was not vaccinated stating she was worried about her allergies.    In the ED she received dexamethasone and remdesivir.  Chest xray shows opacities suggestive of COVID.    Triage vitals: /61    RR 22  Temp 102.3  SpO2 85% on 6L    -Hospital course:    Decadron 6mg q24hrs started on August 7  Remedesivir started on August 7  Goal SaO2- 88-94%    =f/u  -LE doppler  -Inflammatory markers- consider toci if elevated  -Daily CBC, CMP    =Assessment and plan:    #Acute resp failure w/ hypoxemia requiring high flow  #COVID-19 pneumonia   -IV dexamethasone and Remdesivir   -O2 via high flow and titrate PRN   -check inflammatory markers, consider toci if elevated    #Hypothyroidism   -Synthroid    #HTN -stable   -hold HCTZ - hyponatremia     #Electrolyte abnormalities / hypokalemia / hyponatremia - possible dehydration vs, HCTZ side effect  -supplement K_+  -f/u Na  -check urine lyte / osm and serum osm   -hold HCTZ    #Asthma   -Albuterol PRN     #DVT prophylaxis Lovenox 40mg BID  #Diet: DASH/TLC

## 2021-08-08 NOTE — H&P ADULT - NSHPPHYSICALEXAM_GEN_ALL_CORE
General: WN/WD NAD  Neurology: A&Ox3, nonfocal, GUTIERRES x 4  Head:  Normocephalic, atraumatic  ENT:  Mucosa moist, no ulcerations  Neck:  Supple, no sinuses or palpable masses  Lymphatic:  No palpable cervical, supraclavicular, axillary or inguinal adenopathy  Respiratory: CTA B/L  CV: RRR, S1S2, no murmur  Abdominal: Soft, NT, ND no palpable mass  MSK: No edema  Incisions: intact, no erythema or drainage

## 2021-08-08 NOTE — H&P ADULT - ATTENDING COMMENTS
HPI:  66 year old female with history of hypothyroid, pre-diabetes, HTN, and asthma presents with shortness of breath starting this morning.  Patient started feeling fatigued with body aches and diarrhea on Monday and was swabbed positive for COVID at that time.  She started feeling short of breath this morning.  In the field she was found to be saturating in the 60s.  In the ED she improved to 89 on NRB, and is currently comfortable while on high-flow 60% at 60L.  She was not vaccinated stating she was worried about her allergies.    In the ED she received dexamethasone and remdesivir.  Chest xray shows opacities suggestive of COVID.    Triage vitals: /61    RR 22  Temp 102.3  SpO2 85% on 6L (08 Aug 2021 03:04)    REVIEW OF SYSTEMS: see cc/HPI   CONSTITUTIONAL: No weakness, fevers or chills  EYES/ENT: No visual changes;  No vertigo or throat pain   NECK: No pain or stiffness  RESPIRATORY: (+) cough, wheezing, hemoptysis; (+) shortness of breath (+) hypoxia - see cc/HPI   CARDIOVASCULAR: No chest pain or palpitations  GASTROINTESTINAL: No abdominal or epigastric pain. No nausea, vomiting, or hematemesis; No diarrhea or constipation. No melena or hematochezia.  GENITOURINARY: No dysuria, frequency or hematuria  NEUROLOGICAL: No numbness or weakness  SKIN: No itching, rashes    Physical Exam:   General: WN/WD NAD  Neurology: A&Ox3, nonfocal, follows commands  Eyes: PERRLA/ EOMI  ENT/Neck: Neck supple, trachea midline, No JVD  Respiratory: CTA B/L, No wheezing, rales, rhonchi  CV: Normal rate regular rhythm, S1S2, no murmurs, rubs or gallops  Abdominal: Soft, NT, ND +BS,   Extremities: No edema, + peripheral pulses  Skin: No Rashes, Hematoma, Ecchymosis  Incisions:   Tubes: High low O2    A/p  Acute resp failure w/ hypoxemia requiring high flow  COVID-19 pneumonia   -IV dexamethasone and Remdesivir   -O2 via high flow and titrate PRN   -check inflammatory markers  -ID eval for further recommendations     Hypothyroidism   -Synthroid    HTN -stable   -hold HCTZ - hyponatremia     Electrolyte abnormalities / hypokalemia / hyponatremia - possible dehydration vs, HCTZ side effect  -supplement K_+  -check urine lyte / osm and serum osm   -hold HCTZ    Asthma   -Albuterol PRN     DVT prophylaxis

## 2021-08-08 NOTE — H&P ADULT - ASSESSMENT
66 year old female with history of hypothyroid, pre-diabetes, HTN, and asthma presents with shortness of breath starting this morning    # Shortness of breath secondary to severe COVID pneumonia  - swabbed positive on Monday, shortness of breath started this AM // chest xray showing bilateral opacities, currently on high flow satting 98 and comfortable  - D-dimer 137 yet elevated N/L ratio  - c/w remdesivir / dexamethasone  - transaminites likely viral  - possible candidate for convalescent plasma  - f/u CRP, INR  - f/u ID  - possibility of decompensating considering elevated N/L ratio and high oxygen demands soon after feeling dyspneic  - f/u pulm, will get va duplex, comfortable on high flow now but may benefit from closer monitoring later on    # Hypothyroid  - c/w synthroid 137  - f/u TSH    # HTN  - takes losartan 100 / HCTZ 25 at home  - will hold both as patient is normotensive as well as hyponatremic/hypochloremic likely from HCTZ and fever    # Asthma  - no wheezing on exam  - c/w albuterol as needed and singulair    # Hyponatremia  - likely from fever and HCTZ, patient received 1L NS bolus, f/u in AM    PLAN: monitor oxygen needs, f/u ID and pulm    # DVT PPX: lovenox 40 bid  # GI PPX: protonix  # Diet: DASH  FULL CODE

## 2021-08-08 NOTE — CONSULT NOTE ADULT - ASSESSMENT
IMPRESSION:    Acute hypoxemic resp failure on 60%  severe covid pneumonia  Doubt bacterial superinfection  hyponatremia      PLAN:    CNS: Avoid CNS depressant    HEENT:  Oral care    PULMONARY:  HOB @ 45 degrees, HHFNC/ BIPAP at night to keep SaO2 88 to 94%, incentive spirometry    CARDIOVASCULAR: keep equal balance    GI: GI prophylaxis                                          Feeding  po    RENAL:  F/u  lytes.  Correct as needed. accurate I/O, trend CMP    INFECTIOUS DISEASE: Trend inflammatory markers, RZD, Decadron 6 mg q 12h    HEMATOLOGICAL:  DVT prophylaxis. LE doppler    ENDOCRINE:  Follow up FS.  Insulin protocol if needed.      CODE STATUS: FULL CODE    DISPOSITION: SDU    
66 year old female with history of hypothyroid, pre-diabetes, HTN, and asthma presents with shortness of breath starting 8/7 .  Patient started feeling fatigued with body aches and diarrhea on 8/2 and was swabbed positive for COVID at that time.  She started feeling short of breath this morning.  In the field she was found to be saturating in the 60s.  In the ED she improved to 89 on NRB, and is currently comfortable while on high-flow 60% at 60L.  She was not vaccinated .    IMPRESSION;  COVID 19 with severe illness. SpO2 < 94% on RA and need for supplemental O2.  Pt is inbetween the early viral replicative phase and the late immune mediated phase based on the onset of symptoms.  CXR b/l opacities  Ddimers 137  Transaminitis : mild, COVID-19 related.    RECOMMENDATIONS;  Target SpO2 92 % to 96 %  f/u ferritin, CRP, procalcitonin   Day 1 : Single  mg IV loading dose  Day 2-5 : single  mg IV once daily maintenance dose  Daily CBC,CMP.  Dexamethasone 6 mg iv q24h for 10 days.  Monitor for side effects: hyperglycemia, neurological ( agitation/confusion), adrenal suppression, bacterial and fungal infections  Will give Toci if inflammatory markers are elevated  I shall be away form 8/8-8/14 and will be covered by Vignesh Palafox/Pham ; [employer] [id number].

## 2021-08-09 NOTE — PROGRESS NOTE ADULT - SUBJECTIVE AND OBJECTIVE BOX
Patient is a 66y old  Female who presents with a chief complaint of covid pneumonia (08 Aug 2021 07:16)        Over Night Events:  patient seen and examined at the bedside   feels breathing is improving  used NIV overnight      ROS:     All ROS are negative except HPI         PHYSICAL EXAM    ICU Vital Signs Last 24 Hrs  T(C): 35.9 (09 Aug 2021 08:00), Max: 38.8 (09 Aug 2021 01:00)  T(F): 96.7 (09 Aug 2021 08:00), Max: 101.8 (09 Aug 2021 01:00)  HR: 72 (09 Aug 2021 10:00) (69 - 82)  BP: 147/67 (09 Aug 2021 10:00) (117/60 - 164/79)  BP(mean): 96 (09 Aug 2021 10:00) (82 - 113)  ABP: --  ABP(mean): --  RR: 24 (09 Aug 2021 06:00) (18 - 32)  SpO2: 91% (09 Aug 2021 10:00) (91% - 98%)      CONSTITUTIONAL:  Well nourished.  NAD    ENT:   Airway patent,   Mouth with normal mucosa.   No thrush  on HFNCO2    EYES:   Pupils equal,   Round and reactive to light.    CARDIAC:   Normal rate,   Regular rhythm.    No edema      RESPIRATORY:   No wheezing  Bilateral crackles  Normal chest expansion  Not tachypneic,  No use of accessory muscles    GASTROINTESTINAL:  Abdomen soft,   Non-tender,   No guarding,     MUSCULOSKELETAL:   Range of motion is not limited,  No clubbing, cyanosis    NEUROLOGICAL:   Alert and oriented   No motor  deficits.    SKIN:   Skin normal color for race,   Warm and dry and intact.   No evidence of rash.    PSYCHIATRIC:   Normal mood and affect.   No apparent risk to self or others.      LABS:                            12.3   6.66  )-----------( 390      ( 09 Aug 2021 06:21 )             37.0                                               08-09    133<L>  |  95<L>  |  15  ----------------------------<  143<H>  3.8   |  19  |  1.0    Ca    7.8<L>      09 Aug 2021 06:21  Mg     1.9     08-09    TPro  5.9<L>  /  Alb  3.1<L>  /  TBili  0.4  /  DBili  x   /  AST  59<H>  /  ALT  44<H>  /  AlkPhos  82  08-09      PT/INR - ( 08 Aug 2021 07:39 )   PT: 11.80 sec;   INR: 1.03 ratio                                                                                              LIVER FUNCTIONS - ( 09 Aug 2021 06:21 )  Alb: 3.1 g/dL / Pro: 5.9 g/dL / ALK PHOS: 82 U/L / ALT: 44 U/L / AST: 59 U/L / GGT: x                                                                                                                                       MEDICATIONS  (STANDING):  aspirin  chewable 81 milliGRAM(s) Oral daily  dexAMETHasone  Injectable 6 milliGRAM(s) IV Push daily  enoxaparin Injectable 40 milliGRAM(s) SubCutaneous two times a day  guaiFENesin  milliGRAM(s) Oral every 12 hours  levothyroxine 137 MICROGram(s) Oral daily  montelukast 10 milliGRAM(s) Oral daily  remdesivir  IVPB   IV Intermittent   remdesivir  IVPB 100 milliGRAM(s) IV Intermittent every 24 hours    MEDICATIONS  (PRN):  acetaminophen   Tablet .. 650 milliGRAM(s) Oral every 6 hours PRN Temp greater or equal to 38C (100.4F), Mild Pain (1 - 3)  ALBUTerol    90 MICROgram(s) HFA Inhaler 2 Puff(s) Inhalation every 6 hours PRN Shortness of Breath and/or Wheezing      New X-rays reviewed:                                                                                  ECHO    CXR interpreted by me:       Patient is a 66y old  Female who presents with a chief complaint of covid pneumonia (08 Aug 2021 07:16)        Over Night Events:  patient seen and examined at the bedside   feels breathing is improving  used NIV overnight      ROS:     All ROS are negative except HPI         PHYSICAL EXAM    ICU Vital Signs Last 24 Hrs  T(C): 35.9 (09 Aug 2021 08:00), Max: 38.8 (09 Aug 2021 01:00)  T(F): 96.7 (09 Aug 2021 08:00), Max: 101.8 (09 Aug 2021 01:00)  HR: 72 (09 Aug 2021 10:00) (69 - 82)  BP: 147/67 (09 Aug 2021 10:00) (117/60 - 164/79)  BP(mean): 96 (09 Aug 2021 10:00) (82 - 113)  ABP: --  ABP(mean): --  RR: 24 (09 Aug 2021 06:00) (18 - 32)  SpO2: 91% (09 Aug 2021 10:00) (91% - 98%)      CONSTITUTIONAL:  Well nourished.  NAD    ENT:   Airway patent,   Mouth with normal mucosa.   No thrush  on HFNCO2    EYES:   Pupils equal,   Round and reactive to light.    CARDIAC:   Normal rate,   Regular rhythm.    No edema      RESPIRATORY:   No wheezing  Bilateral crackles  Normal chest expansion  Not tachypneic,  No use of accessory muscles    GASTROINTESTINAL:  Abdomen soft,   Non-tender,   No guarding,     MUSCULOSKELETAL:   Range of motion is not limited,  No clubbing, cyanosis    NEUROLOGICAL:   Alert and oriented   No motor  deficits.    SKIN:   Skin normal color for race,   Warm and dry and intact.   No evidence of rash.    PSYCHIATRIC:   Normal mood and affect.   No apparent risk to self or others.      LABS:                            12.3   6.66  )-----------( 390      ( 09 Aug 2021 06:21 )             37.0                                               08-09    133<L>  |  95<L>  |  15  ----------------------------<  143<H>  3.8   |  19  |  1.0    Ca    7.8<L>      09 Aug 2021 06:21  Mg     1.9     08-09    TPro  5.9<L>  /  Alb  3.1<L>  /  TBili  0.4  /  DBili  x   /  AST  59<H>  /  ALT  44<H>  /  AlkPhos  82  08-09      PT/INR - ( 08 Aug 2021 07:39 )   PT: 11.80 sec;   INR: 1.03 ratio                                                                                              LIVER FUNCTIONS - ( 09 Aug 2021 06:21 )  Alb: 3.1 g/dL / Pro: 5.9 g/dL / ALK PHOS: 82 U/L / ALT: 44 U/L / AST: 59 U/L / GGT: x                                                                                                                                       MEDICATIONS  (STANDING):  aspirin  chewable 81 milliGRAM(s) Oral daily  dexAMETHasone  Injectable 6 milliGRAM(s) IV Push daily  enoxaparin Injectable 40 milliGRAM(s) SubCutaneous two times a day  guaiFENesin  milliGRAM(s) Oral every 12 hours  levothyroxine 137 MICROGram(s) Oral daily  montelukast 10 milliGRAM(s) Oral daily  remdesivir  IVPB   IV Intermittent   remdesivir  IVPB 100 milliGRAM(s) IV Intermittent every 24 hours    MEDICATIONS  (PRN):  acetaminophen   Tablet .. 650 milliGRAM(s) Oral every 6 hours PRN Temp greater or equal to 38C (100.4F), Mild Pain (1 - 3)  ALBUTerol    90 MICROgram(s) HFA Inhaler 2 Puff(s) Inhalation every 6 hours PRN Shortness of Breath and/or Wheezing      New X-rays reviewed:                                                                                  ECHO    CXR interpreted by me:  Bilateral infiltrates

## 2021-08-09 NOTE — PROGRESS NOTE ADULT - ASSESSMENT
IMPRESSION:    Acute hypoxemic resp failure on 90%  Severe covid pneumonia  Doubt bacterial superinfection  hyponatremia-improving      PLAN:    CNS: Avoid CNS depressant    HEENT:  Oral care    PULMONARY:  HOB @ 45 degrees, HHFNC/ BIPAP at night to keep SaO2 88 to 94%, incentive spirometry. Antitussive ATC    CARDIOVASCULAR: keep equal balance    GI: GI prophylaxis,  Feeding  po    RENAL:  F/u  lytes.  Correct as needed. accurate I/O, trend CMP    INFECTIOUS DISEASE: Trend inflammatory markers, RZD, Decadron 6 mg q 12h. ABX per ID    HEMATOLOGICAL:  DVT prophylaxis. LE doppler    ENDOCRINE:  Follow up FS.  Insulin protocol if needed.      CODE STATUS: FULL CODE    DISPOSITION: SDU     IMPRESSION:    Acute hypoxemic resp failure on 90%  Severe covid pneumonia  Probable bacterial superinfection  Hyponatremia-improving    PLAN:    CNS: Avoid CNS depressant    HEENT:  Oral care    PULMONARY:  HOB @ 45 degrees, HHFNC/ BIPAP at night to keep SaO2 88 to 94%, incentive spirometry. Antitussive ATC    CARDIOVASCULAR: Avoid volume overload     GI: GI prophylaxis,  Feeding  po    RENAL:  F/u  lytes.  Correct as needed. accurate I/O, trend CMP    INFECTIOUS DISEASE: Trend inflammatory markers, Decadron 6 mg Daily.  Rocephin adn Azithromycin.  Urine Legionella and Strep Ag.      HEMATOLOGICAL:  DVT prophylaxis. LE duplex negative     ENDOCRINE:  Follow up FS.  Insulin protocol if needed.    CODE STATUS: FULL CODE    DISPOSITION: SDU

## 2021-08-10 NOTE — PROGRESS NOTE ADULT - SUBJECTIVE AND OBJECTIVE BOX
Patient is a 66y old  Female who presents with a chief complaint of covid pneumonia (09 Aug 2021 11:09)        SUBJECTIVE:  patient seen and examined at the bedside  SOB about the same  complaining of constipation    REVIEW OF SYSTEMS:    All Pertinent ROS are negative except per HPI       PHYSICAL EXAM  Vital Signs Last 24 Hrs  T(C): 36 (10 Aug 2021 07:45), Max: 36.9 (09 Aug 2021 20:00)  T(F): 96.8 (10 Aug 2021 07:45), Max: 98.4 (09 Aug 2021 20:00)  HR: 75 (10 Aug 2021 13:00) (62 - 85)  BP: 152/84 (10 Aug 2021 13:00) (101/70 - 169/83)  BP(mean): 112 (10 Aug 2021 13:00) (77 - 131)  RR: 19 (10 Aug 2021 11:00) (19 - 22)  SpO2: 93% (10 Aug 2021 13:00) (86% - 98%)    CONSTITUTIONAL:  Well nourished.  NAD    ENT:   Airway patent,   No thrush  on HFNCO2    CARDIAC:   Normal rate,   regular rhythm.    no edema      RESPIRATORY:   NO Wheezing  Normal chest expansion  tachypneic,  No use of accessory muscles    GASTROINTESTINAL:  Abdomen soft,   LLQ tenderness,   no guarding,       MUSCULOSKELETAL:   range of motion is not limited,  no clubbing, cyanosis    NEUROLOGICAL:   Alert and oriented   no motor or deficits.    SKIN:   Skin normal color for race,   warm, dry   No evidence of rash.      08-09-21 @ 07:01  -  08-10-21 @ 07:00  --------------------------------------------------------  IN:    IV PiggyBack: 550 mL    Oral Fluid: 650 mL  Total IN: 1200 mL    OUT:    Voided (mL): 2050 mL  Total OUT: 2050 mL    Total NET: -850 mL      08-10-21 @ 07:01  -  08-10-21 @ 13:11  --------------------------------------------------------  IN:    IV PiggyBack: 50 mL    Oral Fluid: 120 mL  Total IN: 170 mL    OUT:    Voided (mL): 200 mL  Total OUT: 200 mL    Total NET: -30 mL          LABS:                          13.2   5.34  )-----------( 404      ( 10 Aug 2021 05:35 )             38.7                                               08-10    134<L>  |  95<L>  |  16  ----------------------------<  228<H>  4.3   |  25  |  0.8    Ca    8.3<L>      10 Aug 2021 05:35  Mg     1.7     08-10    TPro  6.5  /  Alb  3.5  /  TBili  0.4  /  DBili  x   /  AST  57<H>  /  ALT  42<H>  /  AlkPhos  87  08-10                                                                                           LIVER FUNCTIONS - ( 10 Aug 2021 05:35 )  Alb: 3.5 g/dL / Pro: 6.5 g/dL / ALK PHOS: 87 U/L / ALT: 42 U/L / AST: 57 U/L / GGT: x                                                  Culture - Blood (collected 09 Aug 2021 06:21)  Source: .Blood None  Preliminary Report (10 Aug 2021 13:02):    No growth to date.                                                    MEDICATIONS  (STANDING):  aspirin  chewable 81 milliGRAM(s) Oral daily  azithromycin  IVPB      azithromycin  IVPB 500 milliGRAM(s) IV Intermittent every 24 hours  cefTRIAXone   IVPB 1000 milliGRAM(s) IV Intermittent every 24 hours  cefTRIAXone   IVPB      dexAMETHasone  Injectable 6 milliGRAM(s) IV Push every 12 hours  dextrose 40% Gel 15 Gram(s) Oral once  dextrose 5%. 1000 milliLiter(s) (50 mL/Hr) IV Continuous <Continuous>  dextrose 5%. 1000 milliLiter(s) (100 mL/Hr) IV Continuous <Continuous>  dextrose 50% Injectable 25 Gram(s) IV Push once  dextrose 50% Injectable 12.5 Gram(s) IV Push once  dextrose 50% Injectable 25 Gram(s) IV Push once  enoxaparin Injectable 40 milliGRAM(s) SubCutaneous two times a day  glucagon  Injectable 1 milliGRAM(s) IntraMuscular once  guaiFENesin  milliGRAM(s) Oral every 12 hours  insulin glargine Injectable (LANTUS) 16 Unit(s) SubCutaneous every morning  insulin lispro (ADMELOG) corrective regimen sliding scale   SubCutaneous three times a day before meals  insulin lispro Injectable (ADMELOG) 5 Unit(s) SubCutaneous three times a day before meals  levothyroxine 137 MICROGram(s) Oral daily  montelukast 10 milliGRAM(s) Oral daily  remdesivir  IVPB   IV Intermittent   remdesivir  IVPB 100 milliGRAM(s) IV Intermittent every 24 hours    MEDICATIONS  (PRN):  acetaminophen   Tablet .. 650 milliGRAM(s) Oral every 6 hours PRN Temp greater or equal to 38C (100.4F), Mild Pain (1 - 3)  ALBUTerol    90 MICROgram(s) HFA Inhaler 2 Puff(s) Inhalation every 6 hours PRN Shortness of Breath and/or Wheezing      X-Rays reviewed    CXR interpreted by me:

## 2021-08-10 NOTE — PROGRESS NOTE ADULT - ASSESSMENT
IMPRESSION:    Acute hypoxemic resp failure on 90%  Severe covid pneumonia  Probable bacterial superinfection  Hyponatremia-improving  HO Gastritis (recently diagnosed)  HO diverticulitis    PLAN:    CNS: Avoid CNS depressant    HEENT:  Oral care    PULMONARY:  HOB @ 45 degrees, HHFNC/ BIPAP at night to keep SaO2 88 to 94%, incentive spirometry. Antitussive ATC    CARDIOVASCULAR: Avoid volume overload     GI: Protonix BID,  Feeding  po. Increase bowel regimen    RENAL:  F/u  lytes.  Correct as needed. accurate I/O, trend CMP    INFECTIOUS DISEASE: Trend inflammatory markers, Decadron 6 mg Daily.  Rocephin and Azithromycin.  Urine Legionella and Strep Ag.      HEMATOLOGICAL:  DVT prophylaxis.     ENDOCRINE:  Follow up FS.  Insulin protocol if needed.    CODE STATUS: FULL CODE    DISPOSITION: SDU     IMPRESSION:    Acute hypoxemic resp failure  Severe covid pneumonia  Probable bacterial superinfection  Hyponatremia-improving  HO Gastritis (recently diagnosed)  HO diverticulitis    PLAN:    CNS: Avoid CNS depressant    HEENT:  Oral care    PULMONARY:  HOB @ 45 degrees, HHFNC/ BIPAP at night to keep SaO2 88 to 94%, incentive spirometry. Antitussive ATC    CARDIOVASCULAR: Avoid volume overload     GI: Protonix BID,  Feeding  po. Increase bowel regimen    RENAL:  F/u  lytes.  Correct as needed. accurate I/O, trend CMP    INFECTIOUS DISEASE: Trend inflammatory markers, Decadron 6 mg Daily.  Rocephin and Azithromycin.  Urine Legionella and Strep Ag.      HEMATOLOGICAL:  DVT prophylaxis.     ENDOCRINE:  Follow up FS.  Insulin protocol if needed.    CODE STATUS: FULL CODE    DISPOSITION: SDU

## 2021-08-10 NOTE — PROGRESS NOTE ADULT - SUBJECTIVE AND OBJECTIVE BOX
SUBJECTIVE:    Patient is a 66y old Female who presents with a chief complaint of covid pneumonia (09 Aug 2021 11:09)    Currently admitted to medicine with the primary diagnosis of: COVID-19    Today is hospital day 3d.     Overnight Events:     Patient having difficulty sleeping. Had a BM overnight.     PAST MEDICAL & SURGICAL HISTORY  Type 2 diabetes mellitus without complication, without long-term current use of insulin    Essential hypertension    Acute gout, unspecified cause, unspecified site    Gastroesophageal reflux disease without esophagitis    Hypothyroidism, unspecified type    H/O abdominal hysterectomy    H/O thyroidectomy      ALLERGIES:  No Known Allergies    MEDICATIONS:  STANDING MEDICATIONS  aspirin  chewable 81 milliGRAM(s) Oral daily  azithromycin  IVPB      azithromycin  IVPB 500 milliGRAM(s) IV Intermittent every 24 hours  cefTRIAXone   IVPB 1000 milliGRAM(s) IV Intermittent every 24 hours  cefTRIAXone   IVPB      dexAMETHasone  Injectable 6 milliGRAM(s) IV Push every 12 hours  dextrose 40% Gel 15 Gram(s) Oral once  dextrose 5%. 1000 milliLiter(s) IV Continuous <Continuous>  dextrose 5%. 1000 milliLiter(s) IV Continuous <Continuous>  dextrose 50% Injectable 25 Gram(s) IV Push once  dextrose 50% Injectable 12.5 Gram(s) IV Push once  dextrose 50% Injectable 25 Gram(s) IV Push once  enoxaparin Injectable 40 milliGRAM(s) SubCutaneous two times a day  glucagon  Injectable 1 milliGRAM(s) IntraMuscular once  guaiFENesin  milliGRAM(s) Oral every 12 hours  insulin glargine Injectable (LANTUS) 16 Unit(s) SubCutaneous every morning  insulin lispro (ADMELOG) corrective regimen sliding scale   SubCutaneous three times a day before meals  insulin lispro Injectable (ADMELOG) 5 Unit(s) SubCutaneous three times a day before meals  levothyroxine 137 MICROGram(s) Oral daily  montelukast 10 milliGRAM(s) Oral daily  pantoprazole    Tablet 40 milliGRAM(s) Oral two times a day  remdesivir  IVPB   IV Intermittent   remdesivir  IVPB 100 milliGRAM(s) IV Intermittent every 24 hours  senna 2 Tablet(s) Oral at bedtime    PRN MEDICATIONS  acetaminophen   Tablet .. 650 milliGRAM(s) Oral every 6 hours PRN  ALBUTerol    90 MICROgram(s) HFA Inhaler 2 Puff(s) Inhalation every 6 hours PRN    VITALS:   ICU Vital Signs Last 24 Hrs  T(C): 36 (10 Aug 2021 07:45), Max: 36.9 (09 Aug 2021 20:00)  T(F): 96.8 (10 Aug 2021 07:45), Max: 98.4 (09 Aug 2021 20:00)  HR: 75 (10 Aug 2021 13:00) (62 - 85)  BP: 152/84 (10 Aug 2021 13:00) (101/70 - 169/83)  BP(mean): 112 (10 Aug 2021 13:00) (77 - 131)  RR: 19 (10 Aug 2021 11:00) (19 - 22)  SpO2: 93% (10 Aug 2021 13:00) (86% - 98%)      LABS:                        13.2   5.34  )-----------( 404      ( 10 Aug 2021 05:35 )             38.7     08-10    134<L>  |  95<L>  |  16  ----------------------------<  228<H>  4.3   |  25  |  0.8    Ca    8.3<L>      10 Aug 2021 05:35  Mg     1.7     08-10    TPro  6.5  /  Alb  3.5  /  TBili  0.4  /  DBili  x   /  AST  57<H>  /  ALT  42<H>  /  AlkPhos  87  08-10               Procalcitonin, Serum: 0.83 (08-07)    C-Reactive Protein, Serum: 204 (08-07)    Ferritin, Serum: 1426 (08-07)      D-Dimer Assay, Quantitative: 137 (08-07)      Culture - Blood (collected 09 Aug 2021 06:21)  Source: .Blood None  Preliminary Report (10 Aug 2021 13:02):    No growth to date.            RADIOLOGY:    < from: Xray Chest 1 View- PORTABLE-Routine (Xray Chest 1 View- PORTABLE-Routine .) (08.09.21 @ 09:07) >  Impression:    Extensive bilateral pulmonary opacities similar to prior.    < end of copied text >      PHYSICAL EXAM:    GENERAL: NAD, lying in bed comfortably  CHEST/LUNG: coarse bs b/l   HEART: Regular rate and rhythm; No murmurs, rubs, or gallops  ABDOMEN: Bowel sounds present; Soft, Nontender, Nondistended.   EXTREMITIES:  2+ Peripheral Pulses, brisk capillary refill. No edema  NERVOUS SYSTEM:  Alert & Oriented X3, speech clear. No deficits   MSK: FROM all 4 extremities, full and equal strength  SKIN: No rashes or lesions

## 2021-08-10 NOTE — PROGRESS NOTE ADULT - ASSESSMENT
66 year old female with history of hypothyroid, pre-diabetes, HTN, and asthma presents with shortness of breath starting this morning    # Shortness of breath secondary to severe COVID pneumonia+ superimposed bacterial infection  - swabbed positive on Monday, shortness of breath started 8/07  - chest xray showing bilateral opacities,   - currently on high flow 60L/95 satting 98 with BIPAP at night  - transaminites likely viral  - Va duplex - No evidence of deep venous thrombosis or superficial thrombophlebitis in the bilateral lower extremities.  - CRP 8/07 -204  - Procal 8/07 - .83  - D-dimer 137  - Ferritin 1426  - continue remdesivir 8/ 07- 12  - continue  dexamethasone 8/07 - 8/17  - continue abx - recephin + azithromycin  - Fu repeat IFM  - Fu repeat CXR am   - f/u ID  - f/u pulm,     # Hypothyroid  - c/w synthroid 137  - f/u TSH    # HTN  - takes losartan 100 / HCTZ 25 at home  - will hold both as patient is normotensive as well as hyponatremic/hypochloremic likely from HCTZ and fever    # Asthma  - no wheezing on exam  - c/w albuterol as needed and singulair    # Hyponatremia- imrpoving  -  f/u bmp    #Hypomag  - repleted   - fu am     # DVT PPX: lovenox 40 bid  # GI PPX: protonix  # Diet: DASH  # FULL CODE

## 2021-08-11 NOTE — PROGRESS NOTE ADULT - SUBJECTIVE AND OBJECTIVE BOX
Patient is a 66y old  Female who presents with a chief complaint of covid pneumonia (11 Aug 2021 11:25)        Over Night Events:  patient seen and examined at the bedside   feels better today  desaturation with ambulation      ROS:     All ROS are negative except HPI         PHYSICAL EXAM    ICU Vital Signs Last 24 Hrs  T(C): 36.6 (11 Aug 2021 16:00), Max: 36.7 (11 Aug 2021 12:00)  T(F): 97.9 (11 Aug 2021 16:00), Max: 98.1 (11 Aug 2021 12:00)  HR: 79 (11 Aug 2021 16:00) (58 - 87)  BP: 125/71 (11 Aug 2021 16:00) (120/68 - 174/78)  BP(mean): 92 (11 Aug 2021 16:00) (89 - 117)  ABP: --  ABP(mean): --  RR: 19 (11 Aug 2021 16:00) (18 - 32)  SpO2: 91% (11 Aug 2021 16:00) (85% - 97%)      CONSTITUTIONAL:  Well nourished.  NAD    ENT:   Airway patent,   Mouth with normal mucosa.   No thrush  on HFCNCO2    EYES:   Pupils equal,   Round and reactive to light.    CARDIAC:   Normal rate,   Regular rhythm.    No edema      RESPIRATORY:   No wheezing  bibasilar crackles  Normal chest expansion  Not tachypneic,  No use of accessory muscles    GASTROINTESTINAL:  Abdomen soft,   Non-tender,   No guarding,     MUSCULOSKELETAL:   Range of motion is not limited,  No clubbing, cyanosis    NEUROLOGICAL:   Alert and oriented   No motor  deficits.    SKIN:   Skin normal color for race,   Warm and dry and intact.   No evidence of rash.    PSYCHIATRIC:   Normal mood and affect.   No apparent risk to self or others.    08-10-21 @ 07:01  -  08-11-21 @ 07:00  --------------------------------------------------------  IN:    IV PiggyBack: 400 mL    Oral Fluid: 480 mL  Total IN: 880 mL    OUT:    Voided (mL): 1200 mL  Total OUT: 1200 mL    Total NET: -320 mL      08-11-21 @ 07:01 - 08-11-21 @ 16:33  --------------------------------------------------------  IN:    Oral Fluid: 480 mL  Total IN: 480 mL    OUT:    Voided (mL): 500 mL  Total OUT: 500 mL    Total NET: -20 mL          LABS:                            13.0   9.71  )-----------( 415      ( 11 Aug 2021 06:00 )             39.4                                               08-11    137  |  98  |  17  ----------------------------<  169<H>  3.7   |  25  |  0.7    Ca    8.2<L>      11 Aug 2021 06:00  Mg     1.7     08-11    TPro  5.9<L>  /  Alb  3.3<L>  /  TBili  0.4  /  DBili  x   /  AST  56<H>  /  ALT  40  /  AlkPhos  82  08-11                                                                                           LIVER FUNCTIONS - ( 11 Aug 2021 06:00 )  Alb: 3.3 g/dL / Pro: 5.9 g/dL / ALK PHOS: 82 U/L / ALT: 40 U/L / AST: 56 U/L / GGT: x                                                  Culture - Blood (collected 09 Aug 2021 06:21)  Source: .Blood None  Preliminary Report (10 Aug 2021 13:02):    No growth to date.                                                                                      MEDICATIONS  (STANDING):  aspirin  chewable 81 milliGRAM(s) Oral daily  azithromycin  IVPB      azithromycin  IVPB 500 milliGRAM(s) IV Intermittent every 24 hours  cefTRIAXone   IVPB 1000 milliGRAM(s) IV Intermittent every 24 hours  cefTRIAXone   IVPB      dexAMETHasone  Injectable 6 milliGRAM(s) IV Push every 12 hours  dextrose 40% Gel 15 Gram(s) Oral once  dextrose 5%. 1000 milliLiter(s) (100 mL/Hr) IV Continuous <Continuous>  dextrose 5%. 1000 milliLiter(s) (50 mL/Hr) IV Continuous <Continuous>  dextrose 50% Injectable 25 Gram(s) IV Push once  dextrose 50% Injectable 12.5 Gram(s) IV Push once  dextrose 50% Injectable 25 Gram(s) IV Push once  enoxaparin Injectable 40 milliGRAM(s) SubCutaneous two times a day  glucagon  Injectable 1 milliGRAM(s) IntraMuscular once  guaiFENesin  milliGRAM(s) Oral every 12 hours  insulin glargine Injectable (LANTUS) 16 Unit(s) SubCutaneous every morning  insulin lispro (ADMELOG) corrective regimen sliding scale   SubCutaneous three times a day before meals  insulin lispro Injectable (ADMELOG) 5 Unit(s) SubCutaneous three times a day before meals  levothyroxine 137 MICROGram(s) Oral daily  magnesium oxide 400 milliGRAM(s) Oral three times a day with meals  melatonin 5 milliGRAM(s) Oral at bedtime  montelukast 10 milliGRAM(s) Oral daily  pantoprazole    Tablet 40 milliGRAM(s) Oral two times a day  remdesivir  IVPB   IV Intermittent   remdesivir  IVPB 100 milliGRAM(s) IV Intermittent every 24 hours  senna 2 Tablet(s) Oral at bedtime    MEDICATIONS  (PRN):  acetaminophen   Tablet .. 650 milliGRAM(s) Oral every 6 hours PRN Temp greater or equal to 38C (100.4F), Mild Pain (1 - 3)  ALBUTerol    90 MICROgram(s) HFA Inhaler 2 Puff(s) Inhalation every 6 hours PRN Shortness of Breath and/or Wheezing  polyethylene glycol 3350 17 Gram(s) Oral two times a day PRN Constipation      New X-rays reviewed:                                                                                  ECHO    CXR interpreted by me:

## 2021-08-11 NOTE — PROGRESS NOTE ADULT - ASSESSMENT
66 year old female with history of hypothyroid, pre-diabetes, HTN, and asthma presents with shortness of breath, admitted with severe COVID 19 PNA. Unvaccinated    Acute hypoxic respiratory failure secondary to Severe COVID19 pneumonia   Asthma  On bipap overnight, currently on HFNC 60/90 saturating 92%  Procal 0.83, on ceftriaxone and azithromycin  f/u urine legionella and strep  blood cx NGTD  Ddimer 227, LE duplex neg for DVT 8/9  continue with RDV  continue with decadron, started 8/7  repeat inflammatory markers  daily chest xray  pulm/cc following     Hypothyroid  c/w synthroid 137    HTN  - takes losartan 100 / HCTZ 25 at home, currently on hold    Hyponatremia - resolved  Hypomagnesemia - repleting   f/u bmp    #Progress Note Handoff  Pending (specify): improving oxygen requirements     Family discussion: Plan of care discussed with patient, aware and agreeable   Disposition:  from home     Chela Plunkett MD  s. [id number]     66 year old female with history of hypothyroid, pre-diabetes, HTN, and asthma presents with shortness of breath, admitted with severe COVID 19 PNA. Unvaccinated    Acute hypoxic respiratory failure secondary to Severe COVID19 pneumonia   Asthma  On bipap overnight, currently on HFNC 60/90 saturating 92%  Procal 0.83, on ceftriaxone and azithromycin  f/u urine legionella and strep  blood cx NGTD  Ddimer 227, LE duplex neg for DVT 8/9  continue with RDV  continue with decadron, started 8/7  repeat inflammatory markers  daily chest xray  pulm/cc following     Hypothyroid  c/w synthroid 137    HTN  - takes losartan 100 / HCTZ 25 at home, currently on hold    Hyponatremia - resolved  Hypomagnesemia - repleting   f/u bmp    #Progress Note Handoff  Pending (specify): improving oxygen requirements    Family discussion: Plan of care discussed with patient, aware and agreeable   Disposition:  from home     Chela Plunkett MD  s. [id number]

## 2021-08-11 NOTE — PROGRESS NOTE ADULT - ASSESSMENT
IMPRESSION:    Acute hypoxemic resp failure  Severe covid pneumonia  Probable bacterial superinfection  Hyponatremia-resolved  HO Gastritis (recently diagnosed)  HO diverticulitis    PLAN:    CNS: Avoid CNS depressant    HEENT:  Oral care    PULMONARY:  HOB @ 45 degrees, HHFNC/ BIPAP at night to keep SaO2 88 to 94%, Wean O2 as tolerated, incentive spirometry. Antitussive ATC    CARDIOVASCULAR: Avoid volume overload     GI: Protonix BID,  Feeding  po.  Bowel regimen    RENAL:  F/u  lytes.  Correct as needed. accurate I/O, trend CMP    INFECTIOUS DISEASE: Trend inflammatory markers, Decadron 6 mg Daily.  Rocephin and Azithromycin.  Urine Strep Ag.      HEMATOLOGICAL:  DVT prophylaxis.     ENDOCRINE:  Follow up FS.  Insulin protocol if needed.    CODE STATUS: FULL CODE    DISPOSITION: SDU     IMPRESSION:    Acute hypoxemic resp failure  Severe covid pneumonia  Probable bacterial superinfection  Hyponatremia-resolved  HO Gastritis (recently diagnosed)  HO diverticulitis    PLAN:    CNS: Avoid CNS depressant    HEENT:  Oral care    PULMONARY:  HOB @ 45 degrees, HHFNC/ BIPAP at night to keep SaO2 88 to 94%, Wean O2 as tolerated, incentive spirometry. Antitussive ATC    CARDIOVASCULAR: Avoid volume overload     GI: Protonix BID,  Feeding  po.  Bowel regimen    RENAL:  F/u  lytes.  Correct as needed. accurate I/O, trend CMP    INFECTIOUS DISEASE: Trend inflammatory markers, Decadron 6 mg Daily.  Rocephin and Azithromycin.  FU Urine Strep Ag.      HEMATOLOGICAL:  DVT prophylaxis.     ENDOCRINE:  Follow up FS.  Insulin protocol if needed.    CODE STATUS: FULL CODE    DISPOSITION: SDU

## 2021-08-11 NOTE — PROGRESS NOTE ADULT - SUBJECTIVE AND OBJECTIVE BOX
SUBJECTIVE:    Patient is a 66y old Female who presents with a chief complaint of covid pneumonia (09 Aug 2021 11:09)    Currently admitted to medicine with the primary diagnosis of: COVID-19    Today is hospital day 3d.     Overnight Events:     No acute overnight events    PAST MEDICAL & SURGICAL HISTORY  Type 2 diabetes mellitus without complication, without long-term current use of insulin    Essential hypertension    Acute gout, unspecified cause, unspecified site    Gastroesophageal reflux disease without esophagitis    Hypothyroidism, unspecified type    H/O abdominal hysterectomy    H/O thyroidectomy      ALLERGIES:  No Known Allergies    MEDICATIONS:  MEDICATIONS  (STANDING):  aspirin  chewable 81 milliGRAM(s) Oral daily  azithromycin  IVPB      azithromycin  IVPB 500 milliGRAM(s) IV Intermittent every 24 hours  cefTRIAXone   IVPB 1000 milliGRAM(s) IV Intermittent every 24 hours  cefTRIAXone   IVPB      dexAMETHasone  Injectable 6 milliGRAM(s) IV Push every 12 hours  dextrose 40% Gel 15 Gram(s) Oral once  dextrose 5%. 1000 milliLiter(s) (50 mL/Hr) IV Continuous <Continuous>  dextrose 5%. 1000 milliLiter(s) (100 mL/Hr) IV Continuous <Continuous>  dextrose 50% Injectable 25 Gram(s) IV Push once  dextrose 50% Injectable 12.5 Gram(s) IV Push once  dextrose 50% Injectable 25 Gram(s) IV Push once  enoxaparin Injectable 40 milliGRAM(s) SubCutaneous two times a day  glucagon  Injectable 1 milliGRAM(s) IntraMuscular once  guaiFENesin  milliGRAM(s) Oral every 12 hours  insulin glargine Injectable (LANTUS) 16 Unit(s) SubCutaneous every morning  insulin lispro (ADMELOG) corrective regimen sliding scale   SubCutaneous three times a day before meals  insulin lispro Injectable (ADMELOG) 5 Unit(s) SubCutaneous three times a day before meals  levothyroxine 137 MICROGram(s) Oral daily  magnesium oxide 400 milliGRAM(s) Oral three times a day with meals  melatonin 5 milliGRAM(s) Oral at bedtime  montelukast 10 milliGRAM(s) Oral daily  pantoprazole    Tablet 40 milliGRAM(s) Oral two times a day  remdesivir  IVPB   IV Intermittent   remdesivir  IVPB 100 milliGRAM(s) IV Intermittent every 24 hours  senna 2 Tablet(s) Oral at bedtime    MEDICATIONS  (PRN):  acetaminophen   Tablet .. 650 milliGRAM(s) Oral every 6 hours PRN Temp greater or equal to 38C (100.4F), Mild Pain (1 - 3)  ALBUTerol    90 MICROgram(s) HFA Inhaler 2 Puff(s) Inhalation every 6 hours PRN Shortness of Breath and/or Wheezing  polyethylene glycol 3350 17 Gram(s) Oral two times a day PRN Constipation      VITALS:   ICU Vital Signs Last 24 Hrs  T(C): 36 (11 Aug 2021 08:00), Max: 36.4 (11 Aug 2021 04:00)  T(F): 96.8 (11 Aug 2021 08:00), Max: 97.5 (11 Aug 2021 04:00)  HR: 77 (11 Aug 2021 11:00) (58 - 87)  BP: 149/75 (11 Aug 2021 11:00) (127/64 - 174/78)  BP(mean): 105 (11 Aug 2021 11:00) (89 - 117)  RR: 32 (10 Aug 2021 23:05) (32 - 32)  SpO2: 92% (11 Aug 2021 11:00) (85% - 97%)      LABS:                          13.0   9.71  )-----------( 415      ( 11 Aug 2021 06:00 )             39.4 08-11    137  |  98  |  17  ----------------------------<  169  3.7   |  25  |  0.7    Ca    8.2      11 Aug 2021 06:00   Mg     1.7     08-11  TPro  5.9  /  Alb  3.3  /  TBili  0.4  /  DBili  x   /  AST  56  /  ALT  40  /  AlkPhos  82  08-11    Procalcitonin, Serum: 0.83 (08-07)    C-Reactive Protein, Serum: 204 (08-07)    Ferritin, Serum: 1426 (08-07)      D-Dimer Assay, Quantitative: 227 (08-11)  D-Dimer Assay, Quantitative: 137 (08-07)                          13.2   5.34  )-----------( 404      ( 10 Aug 2021 05:35 )             38.7     08-10    134<L>  |  95<L>  |  16  ----------------------------<  228<H>  4.3   |  25  |  0.8    Ca    8.3<L>      10 Aug 2021 05:35  Mg     1.7     08-10    TPro  6.5  /  Alb  3.5  /  TBili  0.4  /  DBili  x   /  AST  57<H>  /  ALT  42<H>  /  AlkPhos  87  08-10               Procalcitonin, Serum: 0.83 (08-07)    C-Reactive Protein, Serum: 204 (08-07)    Ferritin, Serum: 1426 (08-07)      D-Dimer Assay, Quantitative: 137 (08-07)      Culture - Blood (collected 09 Aug 2021 06:21)  Source: .Blood None  Preliminary Report (10 Aug 2021 13:02):    No growth to date.            RADIOLOGY:    < from: Xray Chest 1 View- PORTABLE-Routine (Xray Chest 1 View- PORTABLE-Routine in AM.) (08.10.21 @ 07:26) >  Impression:    Unchanged patchy bilateral airspace opacities.    --- End of Report ---    < end of copied text >      < from: Xray Chest 1 View- PORTABLE-Routine (Xray Chest 1 View- PORTABLE-Routine .) (08.09.21 @ 09:07) >  Impression:    Extensive bilateral pulmonary opacities similar to prior.    < end of copied text >      PHYSICAL EXAM:    GENERAL: NAD, lying in bed comfortably  CHEST/LUNG: coarse bs b/l   HEART: Regular rate and rhythm; No murmurs, rubs, or gallops  ABDOMEN: Bowel sounds present; Soft, Nontender, Nondistended.   EXTREMITIES:  2+ Peripheral Pulses, brisk capillary refill. No edema  NERVOUS SYSTEM:  Alert & Oriented X3, speech clear. No deficits   MSK: FROM all 4 extremities, full and equal strength  SKIN: No rashes or lesions

## 2021-08-11 NOTE — PROGRESS NOTE ADULT - ASSESSMENT
ar old female with history of hypothyroid, pre-diabetes, HTN, and asthma presents with shortness of breath starting this morning    # Shortness of breath secondary to severe COVID pneumonia+ superimposed bacterial infection  - swabbed positive on Monday, shortness of breath started 8/07  - chest xray showing bilateral opacities,   - currently on high flow 60L/90 satting 95 with BIPAP at night  - transaminites likely viral  - Va duplex - No evidence of deep venous thrombosis or superficial thrombophlebitis in the bilateral lower extremities.  - CRP 8/07 -204  - Procal 8/07 - .83  - D-dimer 8/11 137->227  - Ferritin 1426  - continue remdesivir 8/ 07- 12  - continue  dexamethasone 8/07 - 8/17  - continue abx - rocephin + azithromycin  - Fu procal, crp, ferritin   - Fu repeat CXR am   - f/u ID- will give toci 8mg /kg   - f/u pulm,     # Hypothyroid  - c/w synthroid 137  - f/u TSH    # HTN  - takes losartan 100 / HCTZ 25 at home  - will hold both as patient is normotensive as well as hyponatremic/hypochloremic likely from HCTZ and fever    # Asthma  - no wheezing on exam  - c/w albuterol as needed and singulair    # Hyponatremia- resolved    #Hypomag  - repleted   - fu am     # DVT PPX: lovenox 40 bid  # GI PPX: protonix  # Diet: DASH  # FULL CODE   ar old female with history of hypothyroid, pre-diabetes, HTN, and asthma presents with shortness of breath starting this morning    # Shortness of breath secondary to severe COVID pneumonia+ superimposed bacterial infection  - swabbed positive on Monday, shortness of breath started 8/07  - chest xray showing bilateral opacities,   - currently on high flow 60L/90 satting 95 with BIPAP at night  - transaminites likely viral  - Va duplex - No evidence of deep venous thrombosis or superficial thrombophlebitis in the bilateral lower extremities.  - CRP 8/07 -204  - Procal 8/07 - .83  - D-dimer 8/11 137->227  - Ferritin 1426  - continue remdesivir 8/ 07- 12  - continue  dexamethasone 8/07 - 8/17  - continue abx - rocephin + azithromycin  - Fu procal, crp, ferritin   - Fu repeat CXR am   - f/u ID- will hold off on toci 8mg /kg until repeat procal is resulted.   - f/u pulm,     # Hypothyroid  - c/w synthroid 137  - f/u TSH    # HTN  - takes losartan 100 / HCTZ 25 at home  - will hold both as patient is normotensive as well as hyponatremic/hypochloremic likely from HCTZ and fever    # Asthma  - no wheezing on exam  - c/w albuterol as needed and singulair    # Hyponatremia- resolved    #Hypomag  - repleted   - fu am     # DVT PPX: lovenox 40 bid  # GI PPX: protonix  # Diet: DASH  # FULL CODE

## 2021-08-11 NOTE — PROGRESS NOTE ADULT - SUBJECTIVE AND OBJECTIVE BOX
MIRI SUAREZ  66y Female    CHIEF COMPLAINT:    Patient is a 66y old  Female who presents with a chief complaint of covid pneumonia (10 Aug 2021 13:17)      INTERVAL HPI/OVERNIGHT EVENTS:    Patient seen and examined. On bipap overnight, placed on HFNC in AM, tolerating breakfast     ROS: All other systems are negative.    Vital Signs:    T(F): 96.8 (08-11-21 @ 08:00), Max: 97.5 (08-11-21 @ 04:00)  HR: 77 (08-11-21 @ 11:00) (58 - 87)  BP: 149/75 (08-11-21 @ 11:00) (127/64 - 174/78)  RR: 32 (08-10-21 @ 23:05) (32 - 32)  SpO2: 92% (08-11-21 @ 11:00) (85% - 97%)    10 Aug 2021 07:01  -  11 Aug 2021 07:00  --------------------------------------------------------  IN: 880 mL / OUT: 1200 mL / NET: -320 mL    11 Aug 2021 07:01  -  11 Aug 2021 11:21  --------------------------------------------------------  IN: 240 mL / OUT: 0 mL / NET: 240 mL    POCT Blood Glucose.: 177 mg/dL (11 Aug 2021 08:23)  POCT Blood Glucose.: 150 mg/dL (11 Aug 2021 06:57)  POCT Blood Glucose.: 146 mg/dL (10 Aug 2021 21:17)  POCT Blood Glucose.: 202 mg/dL (10 Aug 2021 17:06)  POCT Blood Glucose.: 169 mg/dL (10 Aug 2021 12:06)    PHYSICAL EXAM:    GENERAL:  NAD  SKIN: No rashes or lesions  HEENT: Atraumatic. Normocephalic.   NECK: Supple, No JVD.   PULMONARY: Coarse breath sounds b/l No wheezing. No rales  CVS: Normal S1, S2. Rate and Rhythm are regular.   ABDOMEN/GI: Soft, Nontender, Nondistended; BS present  MSK:  No clubbing or cyanosis   NEUROLOGIC: moves all extremities  PSYCH: Awake and alert     Consultant(s) Notes Reviewed:  [x ] YES  [ ] NO  Care Discussed with Consultants/Other Providers [ x] YES  [ ] NO    LABS:                        13.0   9.71  )-----------( 415      ( 11 Aug 2021 06:00 )             39.4     137  |  98  |  17  ----------------------------<  169<H>  3.7   |  25  |  0.7    Ca    8.2<L>      11 Aug 2021 06:00  Mg     1.7     08-11    TPro  5.9<L>  /  Alb  3.3<L>  /  TBili  0.4  /  DBili  x   /  AST  56<H>  /  ALT  40  /  AlkPhos  82  08-11    Culture - Blood (collected 09 Aug 2021 06:21)  Source: .Blood None  Preliminary Report (10 Aug 2021 13:02):    No growth to date.    RADIOLOGY & ADDITIONAL TESTS:  Imaging or report Personally Reviewed:  [x] YES  [ ] NO  EKG reviewed: [x] YES  [ ] NO    Medications:  Standing  aspirin  chewable 81 milliGRAM(s) Oral daily  azithromycin  IVPB      azithromycin  IVPB 500 milliGRAM(s) IV Intermittent every 24 hours  cefTRIAXone   IVPB 1000 milliGRAM(s) IV Intermittent every 24 hours  cefTRIAXone   IVPB      dexAMETHasone  Injectable 6 milliGRAM(s) IV Push every 12 hours  dextrose 40% Gel 15 Gram(s) Oral once  dextrose 5%. 1000 milliLiter(s) IV Continuous <Continuous>  dextrose 5%. 1000 milliLiter(s) IV Continuous <Continuous>  dextrose 50% Injectable 25 Gram(s) IV Push once  dextrose 50% Injectable 12.5 Gram(s) IV Push once  dextrose 50% Injectable 25 Gram(s) IV Push once  enoxaparin Injectable 40 milliGRAM(s) SubCutaneous two times a day  glucagon  Injectable 1 milliGRAM(s) IntraMuscular once  guaiFENesin  milliGRAM(s) Oral every 12 hours  insulin glargine Injectable (LANTUS) 16 Unit(s) SubCutaneous every morning  insulin lispro (ADMELOG) corrective regimen sliding scale   SubCutaneous three times a day before meals  insulin lispro Injectable (ADMELOG) 5 Unit(s) SubCutaneous three times a day before meals  levothyroxine 137 MICROGram(s) Oral daily  magnesium oxide 400 milliGRAM(s) Oral three times a day with meals  melatonin 5 milliGRAM(s) Oral at bedtime  montelukast 10 milliGRAM(s) Oral daily  pantoprazole    Tablet 40 milliGRAM(s) Oral two times a day  remdesivir  IVPB   IV Intermittent   remdesivir  IVPB 100 milliGRAM(s) IV Intermittent every 24 hours  senna 2 Tablet(s) Oral at bedtime    PRN Meds  acetaminophen   Tablet .. 650 milliGRAM(s) Oral every 6 hours PRN  ALBUTerol    90 MICROgram(s) HFA Inhaler 2 Puff(s) Inhalation every 6 hours PRN  polyethylene glycol 3350 17 Gram(s) Oral two times a day PRN             MIRI SUAREZ  66y Female    CHIEF COMPLAINT:    Patient is a 66y old  Female who presents with a chief complaint of covid pneumonia (10 Aug 2021 13:17)    INTERVAL HPI/OVERNIGHT EVENTS:    Patient seen and examined. On bipap overnight, placed on HFNC in AM, tolerating breakfast     ROS: All other systems are negative.    Vital Signs:    T(F): 96.8 (08-11-21 @ 08:00), Max: 97.5 (08-11-21 @ 04:00)  HR: 77 (08-11-21 @ 11:00) (58 - 87)  BP: 149/75 (08-11-21 @ 11:00) (127/64 - 174/78)  RR: 32 (08-10-21 @ 23:05) (32 - 32)  SpO2: 92% (08-11-21 @ 11:00) (85% - 97%)    10 Aug 2021 07:01  -  11 Aug 2021 07:00  --------------------------------------------------------  IN: 880 mL / OUT: 1200 mL / NET: -320 mL    11 Aug 2021 07:01  -  11 Aug 2021 11:21  --------------------------------------------------------  IN: 240 mL / OUT: 0 mL / NET: 240 mL    POCT Blood Glucose.: 177 mg/dL (11 Aug 2021 08:23)  POCT Blood Glucose.: 150 mg/dL (11 Aug 2021 06:57)  POCT Blood Glucose.: 146 mg/dL (10 Aug 2021 21:17)  POCT Blood Glucose.: 202 mg/dL (10 Aug 2021 17:06)  POCT Blood Glucose.: 169 mg/dL (10 Aug 2021 12:06)    PHYSICAL EXAM:    GENERAL:  NAD  SKIN: No rashes or lesions  HEENT: Atraumatic. Normocephalic.   NECK: Supple, No JVD.   PULMONARY: Coarse breath sounds b/l No wheezing. No rales  CVS: Normal S1, S2. Rate and Rhythm are regular.   ABDOMEN/GI: Soft, Nontender, Nondistended; BS present  MSK:  No clubbing or cyanosis   NEUROLOGIC: moves all extremities  PSYCH: Awake and alert     Consultant(s) Notes Reviewed:  [x ] YES  [ ] NO  Care Discussed with Consultants/Other Providers [ x] YES  [ ] NO    LABS:                        13.0   9.71  )-----------( 415      ( 11 Aug 2021 06:00 )             39.4     137  |  98  |  17  ----------------------------<  169<H>  3.7   |  25  |  0.7    Ca    8.2<L>      11 Aug 2021 06:00  Mg     1.7     08-11    TPro  5.9<L>  /  Alb  3.3<L>  /  TBili  0.4  /  DBili  x   /  AST  56<H>  /  ALT  40  /  AlkPhos  82  08-11    Culture - Blood (collected 09 Aug 2021 06:21)  Source: .Blood None  Preliminary Report (10 Aug 2021 13:02):    No growth to date.    RADIOLOGY & ADDITIONAL TESTS:  Imaging or report Personally Reviewed:  [x] YES  [ ] NO  EKG reviewed: [x] YES  [ ] NO    Medications:  Standing  aspirin  chewable 81 milliGRAM(s) Oral daily  azithromycin  IVPB      azithromycin  IVPB 500 milliGRAM(s) IV Intermittent every 24 hours  cefTRIAXone   IVPB 1000 milliGRAM(s) IV Intermittent every 24 hours  cefTRIAXone   IVPB      dexAMETHasone  Injectable 6 milliGRAM(s) IV Push every 12 hours  dextrose 40% Gel 15 Gram(s) Oral once  dextrose 5%. 1000 milliLiter(s) IV Continuous <Continuous>  dextrose 5%. 1000 milliLiter(s) IV Continuous <Continuous>  dextrose 50% Injectable 25 Gram(s) IV Push once  dextrose 50% Injectable 12.5 Gram(s) IV Push once  dextrose 50% Injectable 25 Gram(s) IV Push once  enoxaparin Injectable 40 milliGRAM(s) SubCutaneous two times a day  glucagon  Injectable 1 milliGRAM(s) IntraMuscular once  guaiFENesin  milliGRAM(s) Oral every 12 hours  insulin glargine Injectable (LANTUS) 16 Unit(s) SubCutaneous every morning  insulin lispro (ADMELOG) corrective regimen sliding scale   SubCutaneous three times a day before meals  insulin lispro Injectable (ADMELOG) 5 Unit(s) SubCutaneous three times a day before meals  levothyroxine 137 MICROGram(s) Oral daily  magnesium oxide 400 milliGRAM(s) Oral three times a day with meals  melatonin 5 milliGRAM(s) Oral at bedtime  montelukast 10 milliGRAM(s) Oral daily  pantoprazole    Tablet 40 milliGRAM(s) Oral two times a day  remdesivir  IVPB   IV Intermittent   remdesivir  IVPB 100 milliGRAM(s) IV Intermittent every 24 hours  senna 2 Tablet(s) Oral at bedtime    PRN Meds  acetaminophen   Tablet .. 650 milliGRAM(s) Oral every 6 hours PRN  ALBUTerol    90 MICROgram(s) HFA Inhaler 2 Puff(s) Inhalation every 6 hours PRN  polyethylene glycol 3350 17 Gram(s) Oral two times a day PRN

## 2021-08-12 NOTE — PROGRESS NOTE ADULT - ASSESSMENT
66 year old female with history of hypothyroid, pre-diabetes, HTN, and asthma presents with shortness of breath, admitted with severe COVID 19 PNA. Unvaccinated    Acute hypoxic respiratory failure secondary to Severe COVID19 pneumonia   Asthma  On bipap overnight, currently on HFNC 60/80 saturating 92%  Procal 0.83--->0.27, on ceftriaxone and azithromycin  f/u urine legionella and strep  blood cx NGTD  Ddimer 227, LE duplex neg for DVT 8/9  continue with RDV  Toci given today 8/12  continue with decadron, started 8/7  repeat inflammatory markers in 48 hours   daily chest xray  pulm/cc following     Hypothyroid  c/w synthroid 137  TSH 0.11, not accurate in acute illness     HTN  - takes losartan 100 / HCTZ 25 at home, currently on hold    Hyponatremia - resolved  Hypomagnesemia - repleting   f/u bmp    #Progress Note Handoff  Pending (specify): improving oxygen requirements    Family discussion: Plan of care discussed with patient, aware and agreeable   Disposition:  from home     Chela Plunkett MD  s. [phone]

## 2021-08-12 NOTE — PROGRESS NOTE ADULT - ASSESSMENT
IMPRESSION:    Acute hypoxemic resp failure  Severe covid pneumonia  Probable bacterial superinfection  Hyponatremia-resolved  HO Gastritis (recently diagnosed)  HO diverticulitis    PLAN:    CNS: Avoid CNS depressant    HEENT:  Oral care    PULMONARY:  HOB @ 45 degrees, BiPAP during sleep.  Wean O2 as tolerated, incentive spirometry.     CARDIOVASCULAR: Avoid volume overload     GI: Protonix BID,  Feeding  po.  Bowel regimen    RENAL:  F/u  lytes.  Correct as needed. accurate I/O, trend CMP    INFECTIOUS DISEASE: Trend inflammatory markers, Decadron 6 mg Daily. Finish ABX course.      HEMATOLOGICAL:  DVT prophylaxis with LMWH     ENDOCRINE:  Follow up FS.  Insulin protocol if needed.    CODE STATUS: FULL CODE    DISPOSITION: SDU

## 2021-08-12 NOTE — PROGRESS NOTE ADULT - SUBJECTIVE AND OBJECTIVE BOX
Patient is a 66y old  Female who presents with a chief complaint of covid pneumonia (12 Aug 2021 16:17)        Over Night Events:  On HFNCO2.  Tolerating BiPAP during sleep.  Feels better         ROS:     All ROS are negative except HPI         PHYSICAL EXAM    ICU Vital Signs Last 24 Hrs  T(C): 36.7 (12 Aug 2021 20:00), Max: 36.8 (12 Aug 2021 16:00)  T(F): 98.1 (12 Aug 2021 20:00), Max: 98.3 (12 Aug 2021 16:00)  HR: 104 (12 Aug 2021 20:00) (64 - 104)  BP: 137/88 (12 Aug 2021 20:00) (121/61 - 179/77)  BP(mean): 106 (12 Aug 2021 20:00) (85 - 119)  ABP: --  ABP(mean): --  RR: 20 (12 Aug 2021 04:00) (19 - 20)  SpO2: 91% (12 Aug 2021 20:00) (90% - 95%)      CONSTITUTIONAL:  Well nourished.  NAD    ENT:   Airway patent,   Mouth with normal mucosa.   No thrush    EYES:   Pupils equal,   Round and reactive to light.    CARDIAC:   Normal rate,   Regular rhythm.    No edema      Vascular:  Normal systolic impulse  No Carotid bruits    RESPIRATORY:   No wheezing  Bilateral crackles  Normal chest expansion  Not tachypneic,  No use of accessory muscles    GASTROINTESTINAL:  Abdomen soft,   Non-tender,   No guarding,   + BS    MUSCULOSKELETAL:   Range of motion is not limited,  No clubbing, cyanosis    NEUROLOGICAL:   Alert and oriented   No motor  deficits.    SKIN:   Skin normal color for race,   Warm and dry and intact.   No evidence of rash.    PSYCHIATRIC:   Normal mood and affect.   No apparent risk to self or others.    HEMATOLOGICAL:  No cervical  lymphadenopathy.  no inguinal lymphadenopathy      08-11-21 @ 07:01  -  08-12-21 @ 07:00  --------------------------------------------------------  IN:    Oral Fluid: 480 mL  Total IN: 480 mL    OUT:    Voided (mL): 1450 mL  Total OUT: 1450 mL    Total NET: -970 mL      08-12-21 @ 07:01  -  08-12-21 @ 20:46  --------------------------------------------------------  IN:    IV PiggyBack: 200 mL  Total IN: 200 mL    OUT:    Voided (mL): 1100 mL  Total OUT: 1100 mL    Total NET: -900 mL          LABS:                            12.5   14.73 )-----------( 388      ( 12 Aug 2021 05:40 )             37.3                                               08-12    137  |  99  |  14  ----------------------------<  152<H>  3.6   |  26  |  0.6<L>    Ca    7.9<L>      12 Aug 2021 05:40  Mg     1.4     08-12    TPro  5.7<L>  /  Alb  3.1<L>  /  TBili  0.5  /  DBili  x   /  AST  50<H>  /  ALT  38  /  AlkPhos  82  08-12                                                                                           LIVER FUNCTIONS - ( 12 Aug 2021 05:40 )  Alb: 3.1 g/dL / Pro: 5.7 g/dL / ALK PHOS: 82 U/L / ALT: 38 U/L / AST: 50 U/L / GGT: x                                                                                                                                       MEDICATIONS  (STANDING):  aspirin  chewable 81 milliGRAM(s) Oral daily  azithromycin  IVPB      azithromycin  IVPB 500 milliGRAM(s) IV Intermittent every 24 hours  cefTRIAXone   IVPB 1000 milliGRAM(s) IV Intermittent every 24 hours  cefTRIAXone   IVPB      dexAMETHasone  Injectable 6 milliGRAM(s) IV Push every 12 hours  dextrose 40% Gel 15 Gram(s) Oral once  dextrose 5%. 1000 milliLiter(s) (50 mL/Hr) IV Continuous <Continuous>  dextrose 5%. 1000 milliLiter(s) (100 mL/Hr) IV Continuous <Continuous>  dextrose 50% Injectable 25 Gram(s) IV Push once  dextrose 50% Injectable 12.5 Gram(s) IV Push once  dextrose 50% Injectable 25 Gram(s) IV Push once  enoxaparin Injectable 40 milliGRAM(s) SubCutaneous two times a day  glucagon  Injectable 1 milliGRAM(s) IntraMuscular once  guaiFENesin  milliGRAM(s) Oral every 12 hours  insulin glargine Injectable (LANTUS) 16 Unit(s) SubCutaneous every morning  insulin lispro (ADMELOG) corrective regimen sliding scale   SubCutaneous three times a day before meals  insulin lispro Injectable (ADMELOG) 5 Unit(s) SubCutaneous three times a day before meals  levothyroxine 137 MICROGram(s) Oral daily  melatonin 5 milliGRAM(s) Oral at bedtime  montelukast 10 milliGRAM(s) Oral daily  pantoprazole    Tablet 40 milliGRAM(s) Oral two times a day  senna 2 Tablet(s) Oral at bedtime    MEDICATIONS  (PRN):  acetaminophen   Tablet .. 650 milliGRAM(s) Oral every 6 hours PRN Temp greater or equal to 38C (100.4F), Mild Pain (1 - 3)  ALBUTerol    90 MICROgram(s) HFA Inhaler 2 Puff(s) Inhalation every 6 hours PRN Shortness of Breath and/or Wheezing  polyethylene glycol 3350 17 Gram(s) Oral two times a day PRN Constipation      New X-rays reviewed:                                                                                  ECHO    CXR interpreted by me:

## 2021-08-12 NOTE — PROGRESS NOTE ADULT - ASSESSMENT
66 old female with history of hypothyroid, pre-diabetes, HTN, and asthma presents with shortness of breath starting this morning    # Shortness of breath secondary to severe COVID pneumonia+ superimposed bacterial infection  - swabbed positive on Monday, shortness of breath started 8/07  - chest xray showing stable bilateral opacities,   - currently on high flow 60/85 satting 93 with BIPAP at night  - Va duplex - No evidence of deep venous thrombosis or superficial thrombophlebitis in the bilateral lower extremities.  - CRP 8/11 -204>42  - Procal 8/11 - .83>.27  - D-dimer 8/11 137->227  - Ferritin 8/11 1426>2247  - legionella neg  - last day of remdesivir 8/ 07- 12  - continue  dexamethasone 8/07 - 8/17  - continue abx - rocephin + azithromycin  - repeat procal, crp, ferritin   - FU strep ag  - f/u ID- will give toci 8mg /kg since repeat procal is better  - f/u pulm,     # Hypothyroid  - c/w synthroid 137  - f/u TSH    # HTN  - takes losartan 100 / HCTZ 25 at home  - will hold both as patient is normotensive as well as hyponatremic/hypochloremic likely from HCTZ and fever    # Asthma  - no wheezing on exam  - c/w albuterol as needed and singulair    # Hyponatremia- resolved  #Hypomag  - repleted   - fu am     # DVT PPX: lovenox 40 bid  # GI PPX: protonix BID  # Diet: DASH  # FULL CODE

## 2021-08-12 NOTE — PROGRESS NOTE ADULT - SUBJECTIVE AND OBJECTIVE BOX
SUBJECTIVE:    Patient is a 66y old Female who presents with a chief complaint of covid pneumonia (09 Aug 2021 11:09)    Currently admitted to medicine with the primary diagnosis of: COVID-19    Today is hospital day 4d.     Overnight Events:     No acute overnight events    PAST MEDICAL & SURGICAL HISTORY  Type 2 diabetes mellitus without complication, without long-term current use of insulin    Essential hypertension    Acute gout, unspecified cause, unspecified site    Gastroesophageal reflux disease without esophagitis    Hypothyroidism, unspecified type    H/O abdominal hysterectomy    H/O thyroidectomy      ALLERGIES:  No Known Allergies    MEDICATIONS:  MEDICATIONS  (STANDING):  aspirin  chewable 81 milliGRAM(s) Oral daily  azithromycin  IVPB      azithromycin  IVPB 500 milliGRAM(s) IV Intermittent every 24 hours  cefTRIAXone   IVPB 1000 milliGRAM(s) IV Intermittent every 24 hours  cefTRIAXone   IVPB      dexAMETHasone  Injectable 6 milliGRAM(s) IV Push every 12 hours  dextrose 40% Gel 15 Gram(s) Oral once  dextrose 5%. 1000 milliLiter(s) (50 mL/Hr) IV Continuous <Continuous>  dextrose 5%. 1000 milliLiter(s) (100 mL/Hr) IV Continuous <Continuous>  dextrose 50% Injectable 25 Gram(s) IV Push once  dextrose 50% Injectable 12.5 Gram(s) IV Push once  dextrose 50% Injectable 25 Gram(s) IV Push once  enoxaparin Injectable 40 milliGRAM(s) SubCutaneous two times a day  glucagon  Injectable 1 milliGRAM(s) IntraMuscular once  guaiFENesin  milliGRAM(s) Oral every 12 hours  insulin glargine Injectable (LANTUS) 16 Unit(s) SubCutaneous every morning  insulin lispro (ADMELOG) corrective regimen sliding scale   SubCutaneous three times a day before meals  insulin lispro Injectable (ADMELOG) 5 Unit(s) SubCutaneous three times a day before meals  levothyroxine 137 MICROGram(s) Oral daily  magnesium sulfate  IVPB 2 Gram(s) IV Intermittent once  melatonin 5 milliGRAM(s) Oral at bedtime  montelukast 10 milliGRAM(s) Oral daily  pantoprazole    Tablet 40 milliGRAM(s) Oral two times a day  senna 2 Tablet(s) Oral at bedtime    MEDICATIONS  (PRN):  acetaminophen   Tablet .. 650 milliGRAM(s) Oral every 6 hours PRN Temp greater or equal to 38C (100.4F), Mild Pain (1 - 3)  ALBUTerol    90 MICROgram(s) HFA Inhaler 2 Puff(s) Inhalation every 6 hours PRN Shortness of Breath and/or Wheezing  polyethylene glycol 3350 17 Gram(s) Oral two times a day PRN Constipation        VITALS:     ICU Vital Signs Last 24 Hrs  T(C): 36.4 (12 Aug 2021 08:16), Max: 36.8 (11 Aug 2021 20:00)  T(F): 97.5 (12 Aug 2021 08:16), Max: 98.2 (11 Aug 2021 20:00)  HR: 78 (12 Aug 2021 06:00) (61 - 87)  BP: 165/82 (12 Aug 2021 06:00) (120/68 - 179/77)  BP(mean): 114 (12 Aug 2021 06:00) (89 - 119)  RR: 20 (12 Aug 2021 04:00) (18 - 20)  SpO2: 93% (12 Aug 2021 08:20) (90% - 97%)        LABS:                          12.5   14.73 )-----------( 388      ( 12 Aug 2021 05:40 )             37.3 08-12    137  |  99  |  14  ----------------------------<  152  3.6   |  26  |  0.6  Ca    7.9      12 Aug 2021 05:40  Mg     1.4     08-12  TPro  5.7  /  Alb  3.1  /  TBili  0.5  /  DBili  x   /  AST  50  /  ALT  38  /  AlkPhos  82  08-12    Procalcitonin, Serum: 0.27 (08-11)  Procalcitonin, Serum: 0.83 (08-07)    C-Reactive Protein, Serum: 42 (08-11)  C-Reactive Protein, Serum: 204 (08-07)    Ferritin, Serum: 2247 (08-11)  Ferritin, Serum: 1426 (08-07)      D-Dimer Assay, Quantitative: 227 (08-11)  D-Dimer Assay, Quantitative: 137 (08-07)                          13.0   9.71  )-----------( 415      ( 11 Aug 2021 06:00 )             39.4 08-11    137  |  98  |  17  ----------------------------<  169  3.7   |  25  |  0.7    Ca    8.2      11 Aug 2021 06:00   Mg     1.7     08-11  TPro  5.9  /  Alb  3.3  /  TBili  0.4  /  DBili  x   /  AST  56  /  ALT  40  /  AlkPhos  82  08-11    Procalcitonin, Serum: 0.83 (08-07)    C-Reactive Protein, Serum: 204 (08-07)    Ferritin, Serum: 1426 (08-07)      D-Dimer Assay, Quantitative: 227 (08-11)  D-Dimer Assay, Quantitative: 137 (08-07)                          13.2   5.34  )-----------( 404      ( 10 Aug 2021 05:35 )             38.7     08-10    134<L>  |  95<L>  |  16  ----------------------------<  228<H>  4.3   |  25  |  0.8    Ca    8.3<L>      10 Aug 2021 05:35  Mg     1.7     08-10    TPro  6.5  /  Alb  3.5  /  TBili  0.4  /  DBili  x   /  AST  57<H>  /  ALT  42<H>  /  AlkPhos  87  08-10               Procalcitonin, Serum: 0.83 (08-07)    C-Reactive Protein, Serum: 204 (08-07)    Ferritin, Serum: 1426 (08-07)      D-Dimer Assay, Quantitative: 137 (08-07)      Culture - Blood (collected 09 Aug 2021 06:21)  Source: .Blood None  Preliminary Report (10 Aug 2021 13:02):    No growth to date.            RADIOLOGY:        < from: Xray Chest 1 View- PORTABLE-Routine (Xray Chest 1 View- PORTABLE-Routine in AM.) (08.10.21 @ 07:26) >  Impression:    Unchanged patchy bilateral airspace opacities.    --- End of Report ---    < end of copied text >      < from: Xray Chest 1 View- PORTABLE-Routine (Xray Chest 1 View- PORTABLE-Routine .) (08.09.21 @ 09:07) >  Impression:    Extensive bilateral pulmonary opacities similar to prior.    < end of copied text >      PHYSICAL EXAM:    GENERAL: NAD, lying in bed comfortably  CHEST/LUNG: coarse bs b/l   HEART: Regular rate and rhythm; No murmurs, rubs, or gallops  ABDOMEN: Bowel sounds present; Soft, Nontender, Nondistended.   EXTREMITIES:  2+ Peripheral Pulses, brisk capillary refill. No edema  NERVOUS SYSTEM:  Alert & Oriented X3, speech clear. No deficits   MSK: FROM all 4 extremities, full and equal strength  SKIN: No rashes or lesions

## 2021-08-12 NOTE — PROGRESS NOTE ADULT - SUBJECTIVE AND OBJECTIVE BOX
MIRI SUAREZ  66y Female    CHIEF COMPLAINT:    Patient is a 66y old  Female who presents with a chief complaint of covid pneumonia (12 Aug 2021 09:08)      INTERVAL HPI/OVERNIGHT EVENTS:    Patient seen and examined. No acute events overnight. Remains on HFNC     ROS: All other systems are negative.    Vital Signs:    T(F): 97.5 (08-12-21 @ 08:16), Max: 98.2 (08-11-21 @ 20:00)  HR: 83 (08-12-21 @ 16:00) (64 - 84)  BP: 134/72 (08-12-21 @ 16:00) (121/61 - 179/77)  RR: 20 (08-12-21 @ 04:00) (19 - 20)  SpO2: 92% (08-12-21 @ 16:00) (90% - 95%)    11 Aug 2021 07:01  -  12 Aug 2021 07:00  --------------------------------------------------------  IN: 480 mL / OUT: 1450 mL / NET: -970 mL    12 Aug 2021 07:01  -  12 Aug 2021 16:17  --------------------------------------------------------  IN: 100 mL / OUT: 400 mL / NET: -300 mL    POCT Blood Glucose.: 175 mg/dL (12 Aug 2021 16:10)  POCT Blood Glucose.: 208 mg/dL (12 Aug 2021 11:09)  POCT Blood Glucose.: 179 mg/dL (12 Aug 2021 09:05)  POCT Blood Glucose.: 194 mg/dL (11 Aug 2021 16:55)    PHYSICAL EXAM:    GENERAL:  NAD  SKIN: No rashes or lesions  HEENT: Atraumatic. Normocephalic.   NECK: Supple, No JVD.   PULMONARY: Coarse breath sounds b/l. No wheezing. No rales  CVS: Normal S1, S2. Rate and Rhythm are regular.  ABDOMEN/GI: Soft, Nontender, Nondistended  MSK:  No clubbing or cyanosis   NEUROLOGIC: moves all extremities   PSYCH: Alert & oriented x 3, normal affect    Consultant(s) Notes Reviewed:  [x ] YES  [ ] NO  Care Discussed with Consultants/Other Providers [ x] YES  [ ] NO    LABS:                        12.5   14.73 )-----------( 388      ( 12 Aug 2021 05:40 )             37.3     137  |  99  |  14  ----------------------------<  152<H>  3.6   |  26  |  0.6<L>    Ca    7.9<L>      12 Aug 2021 05:40  Mg     1.4     08-12    TPro  5.7<L>  /  Alb  3.1<L>  /  TBili  0.5  /  DBili  x   /  AST  50<H>  /  ALT  38  /  AlkPhos  82  08-12    RADIOLOGY & ADDITIONAL TESTS:  Imaging or report Personally Reviewed:  [x] YES  [ ] NO  EKG reviewed: [x] YES  [ ] NO    Medications:  Standing  aspirin  chewable 81 milliGRAM(s) Oral daily  azithromycin  IVPB      azithromycin  IVPB 500 milliGRAM(s) IV Intermittent every 24 hours  cefTRIAXone   IVPB 1000 milliGRAM(s) IV Intermittent every 24 hours  cefTRIAXone   IVPB      dexAMETHasone  Injectable 6 milliGRAM(s) IV Push every 12 hours  dextrose 40% Gel 15 Gram(s) Oral once  dextrose 5%. 1000 milliLiter(s) IV Continuous <Continuous>  dextrose 5%. 1000 milliLiter(s) IV Continuous <Continuous>  dextrose 50% Injectable 25 Gram(s) IV Push once  dextrose 50% Injectable 12.5 Gram(s) IV Push once  dextrose 50% Injectable 25 Gram(s) IV Push once  enoxaparin Injectable 40 milliGRAM(s) SubCutaneous two times a day  glucagon  Injectable 1 milliGRAM(s) IntraMuscular once  guaiFENesin  milliGRAM(s) Oral every 12 hours  insulin glargine Injectable (LANTUS) 16 Unit(s) SubCutaneous every morning  insulin lispro (ADMELOG) corrective regimen sliding scale   SubCutaneous three times a day before meals  insulin lispro Injectable (ADMELOG) 5 Unit(s) SubCutaneous three times a day before meals  levothyroxine 137 MICROGram(s) Oral daily  melatonin 5 milliGRAM(s) Oral at bedtime  montelukast 10 milliGRAM(s) Oral daily  pantoprazole    Tablet 40 milliGRAM(s) Oral two times a day  senna 2 Tablet(s) Oral at bedtime  tocilizumab IVPB 800 milliGRAM(s) IV Intermittent once    PRN Meds  acetaminophen   Tablet .. 650 milliGRAM(s) Oral every 6 hours PRN  ALBUTerol    90 MICROgram(s) HFA Inhaler 2 Puff(s) Inhalation every 6 hours PRN  polyethylene glycol 3350 17 Gram(s) Oral two times a day PRN

## 2021-08-13 NOTE — PROGRESS NOTE ADULT - SUBJECTIVE AND OBJECTIVE BOX
Patient is a 66y old  Female who presents with a chief complaint of covid pneumonia (12 Aug 2021 16:17)        Over Night Events:  On HFNCO2.  60 liters 80%.  Tolerating NIV during sleep.  Off pressors         ROS:     All ROS are negative except HPI         PHYSICAL EXAM    ICU Vital Signs Last 24 Hrs  T(C): 36.5 (13 Aug 2021 04:00), Max: 36.8 (12 Aug 2021 16:00)  T(F): 97.7 (13 Aug 2021 04:00), Max: 98.3 (12 Aug 2021 16:00)  HR: 82 (13 Aug 2021 06:00) (64 - 104)  BP: 154/76 (13 Aug 2021 06:00) (121/61 - 178/77)  BP(mean): 104 (13 Aug 2021 06:00) (85 - 113)  ABP: --  ABP(mean): --  RR: 22 (13 Aug 2021 06:00) (22 - 28)  SpO2: 93% (13 Aug 2021 06:00) (89% - 97%)      CONSTITUTIONAL:  Well nourished.  NAD    ENT:   Airway patent,   Mouth with normal mucosa.   No thrush    EYES:   Pupils equal,   Round and reactive to light.    CARDIAC:   Normal rate,   Regular rhythm.    No edema      Vascular:  Normal systolic impulse  No Carotid bruits    RESPIRATORY:   No wheezing  Bilateral crackles   Normal chest expansion  Not tachypneic,  No use of accessory muscles    GASTROINTESTINAL:  Abdomen soft,   Non-tender,   No guarding,   + BS    MUSCULOSKELETAL:   Range of motion is not limited,  No clubbing, cyanosis    NEUROLOGICAL:   Alert and oriented   No motor  deficits.    SKIN:   Skin normal color for race,   Warm and dry and intact.   No evidence of rash.    PSYCHIATRIC:   Normal mood and affect.   No apparent risk to self or others.    HEMATOLOGICAL:  No cervical  lymphadenopathy.  no inguinal lymphadenopathy      08-12-21 @ 07:01  -  08-13-21 @ 07:00  --------------------------------------------------------  IN:    IV PiggyBack: 600 mL    Oral Fluid: 200 mL  Total IN: 800 mL    OUT:    Voided (mL): 1800 mL  Total OUT: 1800 mL    Total NET: -1000 mL          LABS:                            12.6   14.50 )-----------( 417      ( 13 Aug 2021 05:49 )             37.8                                               08-13    138  |  98  |  14  ----------------------------<  149<H>  3.8   |  24  |  0.6<L>    Ca    7.7<L>      13 Aug 2021 05:49  Mg     1.7     08-13    TPro  5.8<L>  /  Alb  3.2<L>  /  TBili  0.7  /  DBili  x   /  AST  47<H>  /  ALT  38  /  AlkPhos  80  08-13                                                                                           LIVER FUNCTIONS - ( 13 Aug 2021 05:49 )  Alb: 3.2 g/dL / Pro: 5.8 g/dL / ALK PHOS: 80 U/L / ALT: 38 U/L / AST: 47 U/L / GGT: x                                                                                                                                       MEDICATIONS  (STANDING):  aspirin  chewable 81 milliGRAM(s) Oral daily  azithromycin  IVPB      azithromycin  IVPB 500 milliGRAM(s) IV Intermittent every 24 hours  cefTRIAXone   IVPB 1000 milliGRAM(s) IV Intermittent every 24 hours  cefTRIAXone   IVPB      dexAMETHasone  Injectable 6 milliGRAM(s) IV Push every 12 hours  dextrose 40% Gel 15 Gram(s) Oral once  dextrose 5%. 1000 milliLiter(s) (50 mL/Hr) IV Continuous <Continuous>  dextrose 5%. 1000 milliLiter(s) (100 mL/Hr) IV Continuous <Continuous>  dextrose 50% Injectable 25 Gram(s) IV Push once  dextrose 50% Injectable 12.5 Gram(s) IV Push once  dextrose 50% Injectable 25 Gram(s) IV Push once  enoxaparin Injectable 40 milliGRAM(s) SubCutaneous two times a day  glucagon  Injectable 1 milliGRAM(s) IntraMuscular once  guaiFENesin  milliGRAM(s) Oral every 12 hours  insulin glargine Injectable (LANTUS) 16 Unit(s) SubCutaneous every morning  insulin lispro (ADMELOG) corrective regimen sliding scale   SubCutaneous three times a day before meals  insulin lispro Injectable (ADMELOG) 5 Unit(s) SubCutaneous three times a day before meals  levothyroxine 137 MICROGram(s) Oral daily  melatonin 5 milliGRAM(s) Oral at bedtime  montelukast 10 milliGRAM(s) Oral daily  pantoprazole    Tablet 40 milliGRAM(s) Oral two times a day  senna 2 Tablet(s) Oral at bedtime    MEDICATIONS  (PRN):  acetaminophen   Tablet .. 650 milliGRAM(s) Oral every 6 hours PRN Temp greater or equal to 38C (100.4F), Mild Pain (1 - 3)  ALBUTerol    90 MICROgram(s) HFA Inhaler 2 Puff(s) Inhalation every 6 hours PRN Shortness of Breath and/or Wheezing  polyethylene glycol 3350 17 Gram(s) Oral two times a day PRN Constipation      New X-rays reviewed:                                                                                  ECHO    CXR interpreted by me:

## 2021-08-13 NOTE — PROGRESS NOTE ADULT - SUBJECTIVE AND OBJECTIVE BOX
RACHEL SUAREZJAYA  66y Female    CHIEF COMPLAINT:    Patient is a 66y old  Female who presents with a chief complaint of covid pneumonia (13 Aug 2021 07:54)      INTERVAL HPI/OVERNIGHT EVENTS:    Patient seen and examined. No acute events overnight. used bipap overnight, now on HFNC     ROS: All other systems are negative.    Vital Signs:    T(F): 98.1 (21 @ 07:37), Max: 98.3 (21 @ 16:00)  HR: 83 (21 @ 11:00) (65 - 104)  BP: 156/81 (21 @ 11:00) (121/61 - 164/74)  RR: 22 (21 @ 11:00) (18 - 28)  SpO2: 89% (21 @ 11:00) (89% - 97%)    12 Aug 2021 07:01  -  13 Aug 2021 07:00  --------------------------------------------------------  IN: 800 mL / OUT: 1800 mL / NET: -1000 mL    13 Aug 2021 07:01  -  13 Aug 2021 11:10  --------------------------------------------------------  IN: 240 mL / OUT: 0 mL / NET: 240 mL    Daily Weight in k (13 Aug 2021 04:00)    POCT Blood Glucose.: 161 mg/dL (13 Aug 2021 07:19)  POCT Blood Glucose.: 141 mg/dL (12 Aug 2021 21:17)  POCT Blood Glucose.: 175 mg/dL (12 Aug 2021 16:10)    PHYSICAL EXAM:    GENERAL:  NAD  SKIN: No rashes or lesions  HEENT: Atraumatic. Normocephalic.   NECK: Supple, No JVD.    PULMONARY: Coarse breath sounds . No wheezing. No rales  CVS: Normal S1, S2. Rate and Rhythm are regular.    ABDOMEN/GI: Soft, Nontender, Nondistended  MSK:  No clubbing or cyanosis  NEUROLOGIC: Moves all extremities  PSYCH: Alert & oriented x 3, normal affect    Consultant(s) Notes Reviewed:  [x ] YES  [ ] NO  Care Discussed with Consultants/Other Providers [ x] YES  [ ] NO    LABS:                        12.6   14.50 )-----------( 417      ( 13 Aug 2021 05:49 )             37.8     138  |  98  |  14  ----------------------------<  149<H>  3.8   |  24  |  0.6<L>    Ca    7.7<L>      13 Aug 2021 05:49  Mg     1.7         TPro  5.8<L>  /  Alb  3.2<L>  /  TBili  0.7  /  DBili  x   /  AST  47<H>  /  ALT  38  /  AlkPhos  80      RADIOLOGY & ADDITIONAL TESTS:  Imaging or report Personally Reviewed:  [x] YES  [ ] NO  EKG reviewed: [x] YES  [ ] NO    Medications:  Standing  aspirin  chewable 81 milliGRAM(s) Oral daily  azithromycin  IVPB      azithromycin  IVPB 500 milliGRAM(s) IV Intermittent every 24 hours  cefTRIAXone   IVPB 1000 milliGRAM(s) IV Intermittent every 24 hours  cefTRIAXone   IVPB      dexAMETHasone  Injectable 6 milliGRAM(s) IV Push every 12 hours  dextrose 40% Gel 15 Gram(s) Oral once  dextrose 5%. 1000 milliLiter(s) IV Continuous <Continuous>  dextrose 5%. 1000 milliLiter(s) IV Continuous <Continuous>  dextrose 50% Injectable 25 Gram(s) IV Push once  dextrose 50% Injectable 12.5 Gram(s) IV Push once  dextrose 50% Injectable 25 Gram(s) IV Push once  enoxaparin Injectable 40 milliGRAM(s) SubCutaneous two times a day  glucagon  Injectable 1 milliGRAM(s) IntraMuscular once  guaiFENesin  milliGRAM(s) Oral every 12 hours  insulin glargine Injectable (LANTUS) 16 Unit(s) SubCutaneous every morning  insulin lispro (ADMELOG) corrective regimen sliding scale   SubCutaneous three times a day before meals  insulin lispro Injectable (ADMELOG) 5 Unit(s) SubCutaneous three times a day before meals  levothyroxine 137 MICROGram(s) Oral daily  magnesium sulfate  IVPB 2 Gram(s) IV Intermittent once  melatonin 5 milliGRAM(s) Oral at bedtime  montelukast 10 milliGRAM(s) Oral daily  pantoprazole    Tablet 40 milliGRAM(s) Oral two times a day  senna 2 Tablet(s) Oral at bedtime    PRN Meds  acetaminophen   Tablet .. 650 milliGRAM(s) Oral every 6 hours PRN  ALBUTerol    90 MICROgram(s) HFA Inhaler 2 Puff(s) Inhalation every 6 hours PRN  polyethylene glycol 3350 17 Gram(s) Oral two times a day PRN

## 2021-08-13 NOTE — PROGRESS NOTE ADULT - ASSESSMENT
66 old female with history of hypothyroid, pre-diabetes, HTN, and asthma presents with shortness of breath starting this morning    # Shortness of breath secondary to severe COVID pneumonia+ superimposed bacterial infection  - swabbed positive on Monday, shortness of breath started 8/07  - chest xray showing stable bilateral opacities,   - currently on high flow 60/85 satting 93 with BIPAP at night  - Va duplex - No evidence of deep venous thrombosis or superficial thrombophlebitis in the bilateral lower extremities.  - CRP 8/11 -204>42  - Procal 8/11 - .83>.27  - D-dimer 8/11 137->227  - Ferritin 8/11 1426>2247  - legionella neg- strep ag- negative   - completed remdesivir 8/12  - s/p toci 800 mg   - continue  dexamethasone 8/07 - 8/17  - continue abx - rocephin + azithromycin 8/09-8/15  - FU procal, crp, ferritin   - f/u ID  - f/u pulm,     # Hypothyroid  - c/w synthroid 137  - tsh - .08     # HTN  - takes losartan 100 / HCTZ 25 at home  - will hold both as patient is normotensive as well as hyponatremic/hypochloremic likely from HCTZ and fever    # Asthma  - no wheezing on exam  - c/w albuterol as needed and singulair    # Hyponatremia- resolved  # Hypomag  - repleted   - fu am     # DVT PPX: lovenox 40 bid  # GI PPX: protonix BID  # Diet: DASH  # FULL CODE

## 2021-08-13 NOTE — PROGRESS NOTE ADULT - ASSESSMENT
IMPRESSION:    Acute hypoxemic resp failure  Severe covid pneumonia  Probable bacterial superinfection  Hyponatremia-resolved  HO Gastritis (recently diagnosed)  HO diverticulitis    PLAN:    CNS: Avoid CNS depressant    HEENT:  Oral care    PULMONARY:  HOB @ 45 degrees, BiPAP during sleep.  Wean O2 as tolerated.  ENcourage incentive spirometry.     CARDIOVASCULAR: Avoid volume overload     GI: Protonix BID,  Feeding.  Bowel regimen    RENAL:  F/u  lytes.  Correct as needed. accurate I/O, trend CMP    INFECTIOUS DISEASE:  Decadron 6 mg Daily D#7. Finish ABX course total 7 days.      HEMATOLOGICAL:  DVT prophylaxis with LMWH     ENDOCRINE:  Follow up FS.  Insulin protocol if needed.    CODE STATUS: FULL CODE    DISPOSITION: SDU

## 2021-08-13 NOTE — PROGRESS NOTE ADULT - SUBJECTIVE AND OBJECTIVE BOX
SUBJECTIVE:    Patient is a 66y old Female who presents with a chief complaint of covid pneumonia (09 Aug 2021 11:09)    Currently admitted to medicine with the primary diagnosis of: COVID-19    Today is hospital day 5d.     Overnight Events:     No acute overnight events    PAST MEDICAL & SURGICAL HISTORY  Type 2 diabetes mellitus without complication, without long-term current use of insulin    Essential hypertension    Acute gout, unspecified cause, unspecified site    Gastroesophageal reflux disease without esophagitis    Hypothyroidism, unspecified type    H/O abdominal hysterectomy    H/O thyroidectomy      ALLERGIES:  No Known Allergies    MEDICATIONS:    MEDICATIONS  (STANDING):  aspirin  chewable 81 milliGRAM(s) Oral daily  azithromycin  IVPB      azithromycin  IVPB 500 milliGRAM(s) IV Intermittent every 24 hours  cefTRIAXone   IVPB 1000 milliGRAM(s) IV Intermittent every 24 hours  cefTRIAXone   IVPB      dexAMETHasone  Injectable 6 milliGRAM(s) IV Push every 12 hours  dextrose 40% Gel 15 Gram(s) Oral once  dextrose 5%. 1000 milliLiter(s) (50 mL/Hr) IV Continuous <Continuous>  dextrose 5%. 1000 milliLiter(s) (100 mL/Hr) IV Continuous <Continuous>  dextrose 50% Injectable 25 Gram(s) IV Push once  dextrose 50% Injectable 12.5 Gram(s) IV Push once  dextrose 50% Injectable 25 Gram(s) IV Push once  enoxaparin Injectable 40 milliGRAM(s) SubCutaneous two times a day  glucagon  Injectable 1 milliGRAM(s) IntraMuscular once  guaiFENesin  milliGRAM(s) Oral every 12 hours  insulin glargine Injectable (LANTUS) 16 Unit(s) SubCutaneous every morning  insulin lispro (ADMELOG) corrective regimen sliding scale   SubCutaneous three times a day before meals  insulin lispro Injectable (ADMELOG) 5 Unit(s) SubCutaneous three times a day before meals  levothyroxine 137 MICROGram(s) Oral daily  melatonin 5 milliGRAM(s) Oral at bedtime  montelukast 10 milliGRAM(s) Oral daily  pantoprazole    Tablet 40 milliGRAM(s) Oral two times a day  senna 2 Tablet(s) Oral at bedtime    MEDICATIONS  (PRN):  acetaminophen   Tablet .. 650 milliGRAM(s) Oral every 6 hours PRN Temp greater or equal to 38C (100.4F), Mild Pain (1 - 3)  ALBUTerol    90 MICROgram(s) HFA Inhaler 2 Puff(s) Inhalation every 6 hours PRN Shortness of Breath and/or Wheezing  polyethylene glycol 3350 17 Gram(s) Oral two times a day PRN Constipation      VITALS:     ICU Vital Signs Last 24 Hrs  T(C): 36.7 (13 Aug 2021 12:00), Max: 36.8 (12 Aug 2021 16:00)  T(F): 98 (13 Aug 2021 12:00), Max: 98.3 (12 Aug 2021 16:00)  HR: 89 (13 Aug 2021 13:00) (75 - 104)  BP: 156/85 (13 Aug 2021 13:00) (124/73 - 164/74)  BP(mean): 114 (13 Aug 2021 13:00) (93 - 120)  RR: 25 (13 Aug 2021 13:00) (18 - 28)  SpO2: 83% (13 Aug 2021 13:00) (83% - 97%)    LABS:      LABS:  cret                                   12.6   14.50 )-----------( 417      ( 13 Aug 2021 05:49 )             37.8 08-13    138  |  98  |  14  ----------------------------<  149  3.8   |  24  |  0.6  Ca    7.7      13 Aug 2021 05:49  Mg     1.7     08-13  TPro  5.8  /  Alb  3.2  /  TBili  0.7  /  DBili  x   /  AST  47  /  ALT  38  /  AlkPhos  80  08-13    Procal collected  Procalcitonin, Serum: 0.27 (08-11)  Procalcitonin, Serum: 0.83 (08-07)    CRP collected  C-Reactive Protein, Serum: 42 (08-11)  C-Reactive Protein, Serum: 204 (08-07)    Ferritin collected   Ferritin, Serum: 2247 (08-11)  Ferritin, Serum: 1426 (08-07)      D-Dimer Assay, Quantitative: 227 (08-11)  D-Dimer Assay, Quantitative: 137 (08-07)                            12.5   14.73 )-----------( 388      ( 12 Aug 2021 05:40 )             37.3 08-12    137  |  99  |  14  ----------------------------<  152  3.6   |  26  |  0.6  Ca    7.9      12 Aug 2021 05:40  Mg     1.4     08-12  TPro  5.7  /  Alb  3.1  /  TBili  0.5  /  DBili  x   /  AST  50  /  ALT  38  /  AlkPhos  82  08-12    Procalcitonin, Serum: 0.27 (08-11)  Procalcitonin, Serum: 0.83 (08-07)    C-Reactive Protein, Serum: 42 (08-11)  C-Reactive Protein, Serum: 204 (08-07)    Ferritin, Serum: 2247 (08-11)  Ferritin, Serum: 1426 (08-07)      D-Dimer Assay, Quantitative: 227 (08-11)  D-Dimer Assay, Quantitative: 137 (08-07)                          13.0   9.71  )-----------( 415      ( 11 Aug 2021 06:00 )             39.4 08-11    137  |  98  |  17  ----------------------------<  169  3.7   |  25  |  0.7    Ca    8.2      11 Aug 2021 06:00   Mg     1.7     08-11  TPro  5.9  /  Alb  3.3  /  TBili  0.4  /  DBili  x   /  AST  56  /  ALT  40  /  AlkPhos  82  08-11    Procalcitonin, Serum: 0.83 (08-07)    C-Reactive Protein, Serum: 204 (08-07)    Ferritin, Serum: 1426 (08-07)      D-Dimer Assay, Quantitative: 227 (08-11)  D-Dimer Assay, Quantitative: 137 (08-07)                          13.2   5.34  )-----------( 404      ( 10 Aug 2021 05:35 )             38.7     08-10    134<L>  |  95<L>  |  16  ----------------------------<  228<H>  4.3   |  25  |  0.8    Ca    8.3<L>      10 Aug 2021 05:35  Mg     1.7     08-10    TPro  6.5  /  Alb  3.5  /  TBili  0.4  /  DBili  x   /  AST  57<H>  /  ALT  42<H>  /  AlkPhos  87  08-10               Procalcitonin, Serum: 0.83 (08-07)    C-Reactive Protein, Serum: 204 (08-07)    Ferritin, Serum: 1426 (08-07)      D-Dimer Assay, Quantitative: 137 (08-07)      Culture - Blood (collected 09 Aug 2021 06:21)  Source: .Blood None  Preliminary Report (10 Aug 2021 13:02):    No growth to date.            RADIOLOGY:        < from: Xray Chest 1 View- PORTABLE-Routine (Xray Chest 1 View- PORTABLE-Routine in AM.) (08.10.21 @ 07:26) >  Impression:    Unchanged patchy bilateral airspace opacities.    --- End of Report ---    < end of copied text >      < from: Xray Chest 1 View- PORTABLE-Routine (Xray Chest 1 View- PORTABLE-Routine .) (08.09.21 @ 09:07) >  Impression:    Extensive bilateral pulmonary opacities similar to prior.    < end of copied text >      PHYSICAL EXAM:    GENERAL: NAD, lying in bed comfortably  CHEST/LUNG: coarse bs b/l   HEART: Regular rate and rhythm; No murmurs, rubs, or gallops  ABDOMEN: Bowel sounds present; Soft, Nontender, Nondistended.   EXTREMITIES:  2+ Peripheral Pulses, brisk capillary refill. No edema  NERVOUS SYSTEM:  Alert & Oriented X3, speech clear. No deficits   MSK: FROM all 4 extremities, full and equal strength  SKIN: No rashes or lesions

## 2021-08-13 NOTE — PROGRESS NOTE ADULT - ASSESSMENT
66 year old female with history of hypothyroid, pre-diabetes, HTN, and asthma presents with shortness of breath, admitted with severe COVID 19 PNA. Unvaccinated    Acute hypoxic respiratory failure secondary to Severe COVID19 pneumonia   Asthma  On bipap overnight, currently on HFNC 60/80 saturating 89-92%  Procal 0.83--->0.27, on ceftriaxone and azithromycin, to finish a 7 day course  f/u urine legionella and strep  blood cx NGTD  Ddimer 227, LE duplex neg for DVT 8/9  s/p RDV  Toci given  8/12  continue with decadron, started 8/7  repeat inflammatory markers tomorrow  daily chest xray  pulm/cc following     Hypothyroid  c/w synthroid 137  TSH 0.11, not accurate in acute illness     HTN  - takes losartan 100 / HCTZ 25 at home, currently on hold    Hyponatremia - resolved  Hypomagnesemia - repleting   f/u bmp    #Progress Note Handoff  Pending (specify): improving oxygen requirements    Family discussion: Plan of care discussed with patient, aware and agreeable   Disposition:  from home     Chela Plunkett MD  s. [phone]

## 2021-08-14 NOTE — PROGRESS NOTE ADULT - SUBJECTIVE AND OBJECTIVE BOX
MIRI SUAREZ  66y Female    CHIEF COMPLAINT:    Patient is a 66y old  Female who presents with a chief complaint of covid pneumonia (14 Aug 2021 11:26)      INTERVAL HPI/OVERNIGHT EVENTS:    Patient seen and examined. No acute events overnight. More SOB this AM, requiring more oxygen     ROS: All other systems are negative.    Vital Signs:    T(F): 98.6 (21 @ 08:00), Max: 98.6 (21 @ 08:00)  HR: 92 (21 @ 12:00) (72 - 103)  BP: 144/88 (21 @ 12:00) (136/75 - 183/89)  RR: 22 (21 @ 12:00) (20 - 24)  SpO2: 92% (21 @ 12:00) (84% - 97%)    13 Aug 2021 07:  -  14 Aug 2021 07:00  --------------------------------------------------------  IN: 1290 mL / OUT: 1050 mL / NET: 240 mL    14 Aug 2021 07:01  -  14 Aug 2021 15:05  --------------------------------------------------------  IN: 650 mL / OUT: 750 mL / NET: -100 mL    Daily     Daily Weight in k.4 (14 Aug 2021 05:00)  CAPILLARY BLOOD GLUCOSE    POCT Blood Glucose.: 213 mg/dL (14 Aug 2021 11:12)  POCT Blood Glucose.: 157 mg/dL (14 Aug 2021 08:10)  POCT Blood Glucose.: 146 mg/dL (13 Aug 2021 16:20)    PHYSICAL EXAM:    GENERAL:  NAD  SKIN: No rashes or lesions  HEENT: Atraumatic. Normocephalic.   NECK: Supple, No JVD.   PULMONARY: Coarse breath sounds b/l. No wheezing.   CVS: Normal S1, S2. Rate and Rhythm are regular.    ABDOMEN/GI: Soft, Nontender, Nondistended; BS present  MSK:  No clubbing or cyanosis   NEUROLOGIC: moves all extremities  PSYCH: Alert & oriented x 3, normal affect    Consultant(s) Notes Reviewed:  [x ] YES  [ ] NO  Care Discussed with Consultants/Other Providers [ x] YES  [ ] NO    LABS:                        12.6   14.45 )-----------( 459      ( 14 Aug 2021 05:19 )             39.1     138  |  99  |  14  ----------------------------<  131<H>  4.1   |  27  |  0.6<L>    Ca    7.8<L>      14 Aug 2021 05:19  Mg     1.7         TPro  5.7<L>  /  Alb  3.1<L>  /  TBili  0.7  /  DBili  x   /  AST  51<H>  /  ALT  43<H>  /  AlkPhos  82      RADIOLOGY & ADDITIONAL TESTS:  Imaging or report Personally Reviewed:  [x] YES  [ ] NO  EKG reviewed: [x] YES  [ ] NO    Medications:  Standing  aspirin  chewable 81 milliGRAM(s) Oral daily  azithromycin  IVPB 500 milliGRAM(s) IV Intermittent every 24 hours  azithromycin  IVPB      cefTRIAXone   IVPB      cefTRIAXone   IVPB 1000 milliGRAM(s) IV Intermittent every 24 hours  dexAMETHasone  Injectable 6 milliGRAM(s) IV Push daily  dextrose 40% Gel 15 Gram(s) Oral once  dextrose 5%. 1000 milliLiter(s) IV Continuous <Continuous>  dextrose 5%. 1000 milliLiter(s) IV Continuous <Continuous>  dextrose 50% Injectable 25 Gram(s) IV Push once  dextrose 50% Injectable 12.5 Gram(s) IV Push once  dextrose 50% Injectable 25 Gram(s) IV Push once  enoxaparin Injectable 40 milliGRAM(s) SubCutaneous two times a day  glucagon  Injectable 1 milliGRAM(s) IntraMuscular once  insulin glargine Injectable (LANTUS) 16 Unit(s) SubCutaneous every morning  insulin lispro (ADMELOG) corrective regimen sliding scale   SubCutaneous three times a day before meals  insulin lispro Injectable (ADMELOG) 5 Unit(s) SubCutaneous three times a day before meals  levothyroxine 137 MICROGram(s) Oral daily  losartan 100 milliGRAM(s) Oral daily  melatonin 5 milliGRAM(s) Oral at bedtime  montelukast 10 milliGRAM(s) Oral daily  pantoprazole    Tablet 40 milliGRAM(s) Oral two times a day  senna 2 Tablet(s) Oral at bedtime    PRN Meds  acetaminophen   Tablet .. 650 milliGRAM(s) Oral every 6 hours PRN  ALBUTerol    90 MICROgram(s) HFA Inhaler 2 Puff(s) Inhalation every 6 hours PRN  guaifenesin/dextromethorphan Oral Liquid 10 milliLiter(s) Oral every 6 hours PRN  polyethylene glycol 3350 17 Gram(s) Oral two times a day PRN

## 2021-08-14 NOTE — PROGRESS NOTE ADULT - SUBJECTIVE AND OBJECTIVE BOX
Patient is a 66y old  Female who presents with a chief complaint of covid pneumonia (14 Aug 2021 15:05)        Over Night Events:  on HFNCO2.  60 liters 100%.  Off pressors         ROS:     All ROS are negative except HPI         PHYSICAL EXAM    ICU Vital Signs Last 24 Hrs  T(C): 37 (14 Aug 2021 08:00), Max: 37 (14 Aug 2021 08:00)  T(F): 98.6 (14 Aug 2021 08:00), Max: 98.6 (14 Aug 2021 08:00)  HR: 92 (14 Aug 2021 12:00) (72 - 103)  BP: 144/88 (14 Aug 2021 12:00) (136/75 - 183/89)  BP(mean): 110 (14 Aug 2021 12:00) (97 - 128)  ABP: --  ABP(mean): --  RR: 22 (14 Aug 2021 12:00) (20 - 24)  SpO2: 92% (14 Aug 2021 12:00) (84% - 97%)      CONSTITUTIONAL:  Ill appearing. in  NAD    ENT:   Airway patent,   Mouth with normal mucosa.   No thrush    EYES:   Pupils equal,   Round and reactive to light.    CARDIAC:   Normal rate,   Regular rhythm.    No edema      Vascular:  Normal systolic impulse  No Carotid bruits    RESPIRATORY:   No wheezing  Bilateral crackles   Normal chest expansion  Not tachypneic,  No use of accessory muscles    GASTROINTESTINAL:  Abdomen soft,   Non-tender,   No guarding,   + BS    MUSCULOSKELETAL:   Range of motion is not limited,  No clubbing, cyanosis    NEUROLOGICAL:   Alert and oriented   No motor  deficits.    SKIN:   Skin normal color for race,   Warm and dry.   No evidence of rash.    PSYCHIATRIC:   No apparent risk to self or others.    HEMATOLOGICAL:  No cervical  lymphadenopathy.  no inguinal lymphadenopathy      08-13-21 @ 07:01  -  08-14-21 @ 07:00  --------------------------------------------------------  IN:    IV PiggyBack: 590 mL    Oral Fluid: 700 mL  Total IN: 1290 mL    OUT:    Voided (mL): 1050 mL  Total OUT: 1050 mL    Total NET: 240 mL      08-14-21 @ 07:01 - 08-14-21 @ 16:31  --------------------------------------------------------  IN:    IV PiggyBack: 50 mL    Oral Fluid: 600 mL  Total IN: 650 mL    OUT:    Voided (mL): 750 mL  Total OUT: 750 mL    Total NET: -100 mL          LABS:                            12.6   14.45 )-----------( 459      ( 14 Aug 2021 05:19 )             39.1                                               08-14    138  |  99  |  14  ----------------------------<  131<H>  4.1   |  27  |  0.6<L>    Ca    7.8<L>      14 Aug 2021 05:19  Mg     1.7     08-14    TPro  5.7<L>  /  Alb  3.1<L>  /  TBili  0.7  /  DBili  x   /  AST  51<H>  /  ALT  43<H>  /  AlkPhos  82  08-14                                                                                           LIVER FUNCTIONS - ( 14 Aug 2021 05:19 )  Alb: 3.1 g/dL / Pro: 5.7 g/dL / ALK PHOS: 82 U/L / ALT: 43 U/L / AST: 51 U/L / GGT: x                                                                                                                                       MEDICATIONS  (STANDING):  aspirin  chewable 81 milliGRAM(s) Oral daily  azithromycin  IVPB 500 milliGRAM(s) IV Intermittent every 24 hours  azithromycin  IVPB      cefTRIAXone   IVPB      cefTRIAXone   IVPB 1000 milliGRAM(s) IV Intermittent every 24 hours  dexAMETHasone  Injectable 6 milliGRAM(s) IV Push daily  dextrose 40% Gel 15 Gram(s) Oral once  dextrose 5%. 1000 milliLiter(s) (50 mL/Hr) IV Continuous <Continuous>  dextrose 5%. 1000 milliLiter(s) (100 mL/Hr) IV Continuous <Continuous>  dextrose 50% Injectable 25 Gram(s) IV Push once  dextrose 50% Injectable 12.5 Gram(s) IV Push once  dextrose 50% Injectable 25 Gram(s) IV Push once  enoxaparin Injectable 40 milliGRAM(s) SubCutaneous two times a day  glucagon  Injectable 1 milliGRAM(s) IntraMuscular once  insulin glargine Injectable (LANTUS) 16 Unit(s) SubCutaneous every morning  insulin lispro (ADMELOG) corrective regimen sliding scale   SubCutaneous three times a day before meals  insulin lispro Injectable (ADMELOG) 5 Unit(s) SubCutaneous three times a day before meals  levothyroxine 137 MICROGram(s) Oral daily  losartan 100 milliGRAM(s) Oral daily  melatonin 5 milliGRAM(s) Oral at bedtime  montelukast 10 milliGRAM(s) Oral daily  pantoprazole    Tablet 40 milliGRAM(s) Oral two times a day  senna 2 Tablet(s) Oral at bedtime    MEDICATIONS  (PRN):  acetaminophen   Tablet .. 650 milliGRAM(s) Oral every 6 hours PRN Temp greater or equal to 38C (100.4F), Mild Pain (1 - 3)  ALBUTerol    90 MICROgram(s) HFA Inhaler 2 Puff(s) Inhalation every 6 hours PRN Shortness of Breath and/or Wheezing  guaifenesin/dextromethorphan Oral Liquid 10 milliLiter(s) Oral every 6 hours PRN Cough  polyethylene glycol 3350 17 Gram(s) Oral two times a day PRN Constipation      New X-rays reviewed:                                                                                  ECHO    CXR interpreted by me:  bilateral infiltrates

## 2021-08-14 NOTE — PROGRESS NOTE ADULT - ASSESSMENT
66 year old female with history of hypothyroid, pre-diabetes, HTN, and asthma presents with shortness of breath, admitted with severe COVID 19 PNA. Unvaccinated    Acute hypoxic respiratory failure secondary to Severe COVID19 pneumonia   Asthma  On bipap overnight, currently on HFNC 60/100 saturating 92%, Bipap at  night and PRN   Chest xray slightly worse today   s/p RDV  Toci given  8/12  continue with decadron, started 8/7  Procal 0.83--->0.27, on ceftriaxone and azithromycin, to finish a 7 day course  urine legionella and strep negative   blood cx NGTD  Ddimer 227--->800, LE duplex neg for DVT 8/9  repeat inflammatory markers pending   daily chest xray  pulm/cc following     Hypothyroid  c/w synthroid 137  TSH 0.11, not accurate in acute illness     HTN  - takes losartan 100 / HCTZ 25 at home, currently on hold    Hyponatremia - resolved  Hypomagnesemia - repleting   f/u bmp    #Progress Note Handoff  Pending (specify): improving oxygen requirements    Family discussion: Plan of care discussed with patient, aware and agreeable   Disposition:  from home     Chela Plunkett MD  s. [phone]

## 2021-08-14 NOTE — PROGRESS NOTE ADULT - ASSESSMENT
IMPRESSION:    Acute hypoxemic resp failure  Severe covid pneumonia  Probable bacterial superinfection  Hyponatremia-resolved  HO Gastritis (recently diagnosed)  HO diverticulitis    PLAN:    CNS: Avoid CNS depressant    HEENT:  Oral care    PULMONARY:  HOB @ 45 degrees, BiPAP during sleep.  Wean O2 as tolerated.  ENcourage incentive spirometry.     CARDIOVASCULAR: Avoid volume overload     GI: Protonix BID,  Feeding.  Bowel regimen    RENAL:  F/u  lytes.  Correct as needed. accurate I/O, trend CMP    INFECTIOUS DISEASE:  Decadron 6 mg Daily D#8. Finish ABX course total 7 days.      HEMATOLOGICAL:  DVT prophylaxis with LMWH     ENDOCRINE:  Follow up FS.  Insulin protocol if needed.    CODE STATUS: FULL CODE    DISPOSITION: SDU

## 2021-08-14 NOTE — PROGRESS NOTE ADULT - SUBJECTIVE AND OBJECTIVE BOX
SUBJECTIVE:    Patient is a 66y old Female who presents with a chief complaint of covid pneumonia (09 Aug 2021 11:09)    Currently admitted to medicine with the primary diagnosis of: COVID-19    Today is hospital day 6d.     Overnight Events:     No acute overnight events. worsening respiratory status. roberto carlos get d-dimer. however patient does not feel SOB. she is eating well. She has a cough at night.     PAST MEDICAL & SURGICAL HISTORY  Type 2 diabetes mellitus without complication, without long-term current use of insulin    Essential hypertension    Acute gout, unspecified cause, unspecified site    Gastroesophageal reflux disease without esophagitis    Hypothyroidism, unspecified type    H/O abdominal hysterectomy    H/O thyroidectomy      ALLERGIES:  No Known Allergies    MEDICATIONS:    MEDICATIONS  (STANDING):  aspirin  chewable 81 milliGRAM(s) Oral daily  azithromycin  IVPB      azithromycin  IVPB 500 milliGRAM(s) IV Intermittent every 24 hours  cefTRIAXone   IVPB 1000 milliGRAM(s) IV Intermittent every 24 hours  cefTRIAXone   IVPB      dexAMETHasone  Injectable 6 milliGRAM(s) IV Push every 12 hours  enoxaparin Injectable 40 milliGRAM(s) SubCutaneous two times a day  glucagon  Injectable 1 milliGRAM(s) IntraMuscular once  guaiFENesin  milliGRAM(s) Oral every 12 hours  insulin glargine Injectable (LANTUS) 16 Unit(s) SubCutaneous every morning  insulin lispro (ADMELOG) corrective regimen sliding scale   SubCutaneous three times a day before meals  insulin lispro Injectable (ADMELOG) 5 Unit(s) SubCutaneous three times a day before meals  levothyroxine 137 MICROGram(s) Oral daily  losartan 100 milliGRAM(s) Oral daily  melatonin 5 milliGRAM(s) Oral at bedtime  montelukast 10 milliGRAM(s) Oral daily  pantoprazole    Tablet 40 milliGRAM(s) Oral two times a day  senna 2 Tablet(s) Oral at bedtime    MEDICATIONS  (PRN):  acetaminophen   Tablet .. 650 milliGRAM(s) Oral every 6 hours PRN Temp greater or equal to 38C (100.4F), Mild Pain (1 - 3)  ALBUTerol    90 MICROgram(s) HFA Inhaler 2 Puff(s) Inhalation every 6 hours PRN Shortness of Breath and/or Wheezing  polyethylene glycol 3350 17 Gram(s) Oral two times a day PRN Constipation    VITALS:     ICU Vital Signs Last 24 Hrs  T(C): 37 (14 Aug 2021 08:00), Max: 37 (14 Aug 2021 08:00)  T(F): 98.6 (14 Aug 2021 08:00), Max: 98.6 (14 Aug 2021 08:00)  HR: 99 (14 Aug 2021 10:00) (72 - 100)  BP: 146/81 (14 Aug 2021 10:00) (136/75 - 168/83)  BP(mean): 108 (14 Aug 2021 10:00) (97 - 115)  RR: 20 (14 Aug 2021 10:00) (20 - 26)  SpO2: 95% (14 Aug 2021 10:00) (83% - 97%)      LABS:                          12.6   14.45 )-----------( 459      ( 14 Aug 2021 05:19 )             39.1 08-14    138  |  99  |  14  ----------------------------<  131  4.1   |  27  |  0.6  Ca    7.8      14 Aug 2021 05:19  Mg     1.7     08-14  TPro  5.7  /  Alb  3.1  /  TBili  0.7  /  DBili  x   /  AST  51  /  ALT  43  /  AlkPhos  82  08-14    Procalcitonin, Serum: 0.07 (08-13)  Procalcitonin, Serum: 0.27 (08-11)  Procalcitonin, Serum: 0.83 (08-07)    C-Reactive Protein, Serum: 51 (08-13)  C-Reactive Protein, Serum: 42 (08-11)  C-Reactive Protein, Serum: 204 (08-07)    Ferritin, Serum: 1574 (08-13)  Ferritin, Serum: 2247 (08-11)  Ferritin, Serum: 1426 (08-07)      D-Dimer Assay, Quantitative: 227 (08-11)  D-Dimer Assay, Quantitative: 137 (08-07)      LABS:  cret                                   12.6   14.50 )-----------( 417      ( 13 Aug 2021 05:49 )             37.8 08-13    138  |  98  |  14  ----------------------------<  149  3.8   |  24  |  0.6  Ca    7.7      13 Aug 2021 05:49  Mg     1.7     08-13  TPro  5.8  /  Alb  3.2  /  TBili  0.7  /  DBili  x   /  AST  47  /  ALT  38  /  AlkPhos  80  08-13    Procal collected  Procalcitonin, Serum: 0.27 (08-11)  Procalcitonin, Serum: 0.83 (08-07)    CRP collected  C-Reactive Protein, Serum: 42 (08-11)  C-Reactive Protein, Serum: 204 (08-07)    Ferritin collected   Ferritin, Serum: 2247 (08-11)  Ferritin, Serum: 1426 (08-07)      D-Dimer Assay, Quantitative: 227 (08-11)  D-Dimer Assay, Quantitative: 137 (08-07)                            12.5   14.73 )-----------( 388      ( 12 Aug 2021 05:40 )             37.3 08-12    137  |  99  |  14  ----------------------------<  152  3.6   |  26  |  0.6  Ca    7.9      12 Aug 2021 05:40  Mg     1.4     08-12  TPro  5.7  /  Alb  3.1  /  TBili  0.5  /  DBili  x   /  AST  50  /  ALT  38  /  AlkPhos  82  08-12    Procalcitonin, Serum: 0.27 (08-11)  Procalcitonin, Serum: 0.83 (08-07)    C-Reactive Protein, Serum: 42 (08-11)  C-Reactive Protein, Serum: 204 (08-07)    Ferritin, Serum: 2247 (08-11)  Ferritin, Serum: 1426 (08-07)      D-Dimer Assay, Quantitative: 227 (08-11)  D-Dimer Assay, Quantitative: 137 (08-07)                          13.0   9.71  )-----------( 415      ( 11 Aug 2021 06:00 )             39.4 08-11    137  |  98  |  17  ----------------------------<  169  3.7   |  25  |  0.7    Ca    8.2      11 Aug 2021 06:00   Mg     1.7     08-11  TPro  5.9  /  Alb  3.3  /  TBili  0.4  /  DBili  x   /  AST  56  /  ALT  40  /  AlkPhos  82  08-11    Procalcitonin, Serum: 0.83 (08-07)    C-Reactive Protein, Serum: 204 (08-07)    Ferritin, Serum: 1426 (08-07)      D-Dimer Assay, Quantitative: 227 (08-11)  D-Dimer Assay, Quantitative: 137 (08-07)                          13.2   5.34  )-----------( 404      ( 10 Aug 2021 05:35 )             38.7     08-10    134<L>  |  95<L>  |  16  ----------------------------<  228<H>  4.3   |  25  |  0.8    Ca    8.3<L>      10 Aug 2021 05:35  Mg     1.7     08-10    TPro  6.5  /  Alb  3.5  /  TBili  0.4  /  DBili  x   /  AST  57<H>  /  ALT  42<H>  /  AlkPhos  87  08-10               Procalcitonin, Serum: 0.83 (08-07)    C-Reactive Protein, Serum: 204 (08-07)    Ferritin, Serum: 1426 (08-07)      D-Dimer Assay, Quantitative: 137 (08-07)      Culture - Blood (collected 09 Aug 2021 06:21)  Source: .Blood None  Preliminary Report (10 Aug 2021 13:02):    No growth to date.            RADIOLOGY:        < from: Xray Chest 1 View- PORTABLE-Routine (Xray Chest 1 View- PORTABLE-Routine in AM.) (08.10.21 @ 07:26) >  Impression:    Unchanged patchy bilateral airspace opacities.    --- End of Report ---    < end of copied text >      < from: Xray Chest 1 View- PORTABLE-Routine (Xray Chest 1 View- PORTABLE-Routine .) (08.09.21 @ 09:07) >  Impression:    Extensive bilateral pulmonary opacities similar to prior.    < end of copied text >      PHYSICAL EXAM:    GENERAL: NAD, lying in bed comfortably  CHEST/LUNG: coarse bs b/l   HEART: Regular rate and rhythm; No murmurs, rubs, or gallops  ABDOMEN: Bowel sounds present; Soft, Nontender, Nondistended.   EXTREMITIES:  2+ Peripheral Pulses, brisk capillary refill. No edema  NERVOUS SYSTEM:  Alert & Oriented X3, speech clear. No deficits   MSK: FROM all 4 extremities, full and equal strength  SKIN: No rashes or lesions

## 2021-08-14 NOTE — PROGRESS NOTE ADULT - ASSESSMENT
66 old female with history of hypothyroid, pre-diabetes, HTN, and asthma presents with shortness of breath starting this morning    # Shortness of breath secondary to severe COVID pneumonia+ superimposed bacterial infection  - swabbed positive on Monday, shortness of breath started 8/07  - chest xray showing stable bilateral opacities,   - currently on high flow 60/100 satting 93 with BIPAP prn  - Va duplex - No evidence of deep venous thrombosis or superficial thrombophlebitis in the bilateral lower extremities.  - Procal 8/13 - .83>.27>.07  - CRP 8/13 -204>42>51  - Ferritin 8/13 1426>2247>1574  - D-dimer 8/11 137->227  - legionella and strep ag- negative   - s/p remdesivir 8/12  - s/p toci 800 mg   - continue  dexamethasone daily  8/07 - 8/17  - continue abx - rocephin + azithromycin 8/09-8/15  - repeat cxr looks worse than prior cxr-> fu official read  - because of worsening o2 requirement. will repeat d-dimer. if elevated obtain CTA to r/o PE.   - repeat procal, crp, ferritin   - f/u ID  - f/u pulm,     # Hypothyroid  - c/w synthroid 137  - tsh - .08     # HTN  - takes losartan 100 / HCTZ 25 at home  - started on losartan 100 qd    # Asthma  - no wheezing on exam  - c/w albuterol as needed and singulair    # Hyponatremia- resolved  # Hypomag  - repleted   - fu am     # DVT PPX: lovenox 40 bid  # GI PPX: protonix BID  # Diet: DASH  # FULL CODE

## 2021-08-15 NOTE — PROGRESS NOTE ADULT - ASSESSMENT
66 year old female with history of hypothyroid, pre-diabetes, HTN, and asthma presents with shortness of breath, admitted with severe COVID 19 PNA. Unvaccinated    Acute hypoxic respiratory failure secondary to Severe COVID19 pneumonia   Asthma  On bipap overnight, currently on HFNC 60/100 saturating 100%, Bipap at  night and PRN   Taper down supplemental 02 as tolerated  s/p RDV, toci given  8/12  continue with decadron, started 8/7  Procal 0.83--->0.27, on ceftriaxone and azithromycin, to finish a 7 day course  urine legionella and strep negative   blood cx NGTD  Ddimer 227--->800--->2000, LE duplex neg for DVT 8/9  repeat inflammatory markers and LE duplex  daily chest xray  pulm/cc following     RUE swelling/superficial blisters on anterior forearm  IV infiltrated during CTA, blisters likely due to hot compress applied  radial pulses palpable/strong  keep extremity elevated, WARM conpresses (place towel as barrier between skin and warm pack)    Hypothyroid  c/w synthroid 137  TSH 0.11, not accurate in acute illness     HTN  - takes losartan 100 / HCTZ 25 at home, currently on hold    Hyponatremia - resolved  Hypomagnesemia - repleting   f/u bmp    #Progress Note Handoff  Pending (specify): improving oxygen requirements    Family discussion: Plan of care discussed with patient, aware and agreeable   Disposition:  from home     Chela Plunkett MD  s. 1955

## 2021-08-15 NOTE — PROGRESS NOTE ADULT - SUBJECTIVE AND OBJECTIVE BOX
RACHEL SUAREZJAYA  66y Female    CHIEF COMPLAINT:    Patient is a 66y old  Female who presents with a chief complaint of covid pneumonia (15 Aug 2021 10:22)      INTERVAL HPI/OVERNIGHT EVENTS:    Patient seen and examined. No acute events overnight. mild SOB this AM    ROS: All other systems are negative.    Vital Signs:    T(F): 97.7 (08-15-21 @ 12:00), Max: 98.9 (08-15-21 @ 08:00)  HR: 95 (08-15-21 @ 12:00) (75 - 107)  BP: 149/97 (08-15-21 @ 12:00) (112/64 - 174/82)  RR: 24 (08-15-21 @ 12:00) (20 - 27)  SpO2: 100% (08-15-21 @ 12:00) (77% - 100%)    14 Aug 2021 07:01  -  15 Aug 2021 07:00  --------------------------------------------------------  IN: 1050 mL / OUT: 1350 mL / NET: -300 mL    15 Aug 2021 07:01  -  15 Aug 2021 12:37  --------------------------------------------------------  IN: 120 mL / OUT: 0 mL / NET: 120 mL       Daily Weight in k.7 (15 Aug 2021 06:00)    POCT Blood Glucose.: 165 mg/dL (15 Aug 2021 12:19)  POCT Blood Glucose.: 158 mg/dL (15 Aug 2021 07:53)  POCT Blood Glucose.: 131 mg/dL (14 Aug 2021 21:27)  POCT Blood Glucose.: 139 mg/dL (14 Aug 2021 17:37)    PHYSICAL EXAM:    GENERAL:  NAD  SKIN: No rashes or lesions  HEENT: Atraumatic. Normocephalic.    NECK: Supple, No JVD.    PULMONARY: Coarse breath sounds b/l. No wheezing. No rales  CVS: Normal S1, S2. Rate and Rhythm are regular.    ABDOMEN/GI: Soft, Nontender, Nondistended; BS present  MSK:  No edema B/L LE. RUE diffuse swelling, superficial blisters on anterior forearm  NEUROLOGIC:  moves all extremities   PSYCH: Alert & oriented x 3, normal affect    Consultant(s) Notes Reviewed:  [x ] YES  [ ] NO  Care Discussed with Consultants/Other Providers [ x] YES  [ ] NO    LABS:                        12.6   15.90 )-----------( 481      ( 15 Aug 2021 07:43 )             39.0     135  |  97<L>  |  17  ----------------------------<  172<H>  4.0   |  24  |  0.7    Ca    8.1<L>      15 Aug 2021 07:43  Mg     1.9     08-15    TPro  5.7<L>  /  Alb  3.2<L>  /  TBili  1.0  /  DBili  x   /  AST  48<H>  /  ALT  48<H>  /  AlkPhos  82  08-15    RADIOLOGY & ADDITIONAL TESTS:  Imaging or report Personally Reviewed:  [x] YES  [ ] NO  EKG reviewed: [x] YES  [ ] NO    Medications:  Standing  aspirin  chewable 81 milliGRAM(s) Oral daily  azithromycin  IVPB      azithromycin  IVPB 500 milliGRAM(s) IV Intermittent every 24 hours  dexAMETHasone  Injectable 6 milliGRAM(s) IV Push daily  dextrose 40% Gel 15 Gram(s) Oral once  dextrose 5%. 1000 milliLiter(s) IV Continuous <Continuous>  dextrose 5%. 1000 milliLiter(s) IV Continuous <Continuous>  dextrose 50% Injectable 25 Gram(s) IV Push once  dextrose 50% Injectable 12.5 Gram(s) IV Push once  dextrose 50% Injectable 25 Gram(s) IV Push once  enoxaparin Injectable 40 milliGRAM(s) SubCutaneous two times a day  glucagon  Injectable 1 milliGRAM(s) IntraMuscular once  insulin glargine Injectable (LANTUS) 16 Unit(s) SubCutaneous every morning  insulin lispro (ADMELOG) corrective regimen sliding scale   SubCutaneous three times a day before meals  insulin lispro Injectable (ADMELOG) 5 Unit(s) SubCutaneous three times a day before meals  levothyroxine 137 MICROGram(s) Oral daily  losartan 100 milliGRAM(s) Oral daily  melatonin 5 milliGRAM(s) Oral at bedtime  montelukast 10 milliGRAM(s) Oral daily  pantoprazole    Tablet 40 milliGRAM(s) Oral two times a day  senna 2 Tablet(s) Oral at bedtime    PRN Meds  acetaminophen   Tablet .. 650 milliGRAM(s) Oral every 6 hours PRN  ALBUTerol    90 MICROgram(s) HFA Inhaler 2 Puff(s) Inhalation every 6 hours PRN  guaifenesin/dextromethorphan Oral Liquid 10 milliLiter(s) Oral every 6 hours PRN  polyethylene glycol 3350 17 Gram(s) Oral two times a day PRN

## 2021-08-15 NOTE — CHART NOTE - NSCHARTNOTEFT_GEN_A_CORE
Patient is a 66y old  Female who presents with a chief complaint of covid pneumonia.   Patient found to have a swollen RUE with blisters on the right forearm. Patient had an IV infiltrate and was subsequently taken out. Nurse was giving hot pack for symptom relief. Patient is currently endorsing pain in her RUE.   - f/u Duplex study

## 2021-08-15 NOTE — PROGRESS NOTE ADULT - SUBJECTIVE AND OBJECTIVE BOX
MIRI SUAREZ 66y Female  MRN#: [id number]         SUBJECTIVE  Patient is a 66y old Female who presents with a chief complaint of covid pneumonia (14 Aug 2021 16:31)  Currently admitted to medicine with the primary diagnosis of COVID-19    Patient seen in bed. she reports still having SOB. no chest pain.      OBJECTIVE  PAST MEDICAL & SURGICAL HISTORY  Type 2 diabetes mellitus without complication, without long-term current use of insulin    Essential hypertension    Acute gout, unspecified cause, unspecified site    Gastroesophageal reflux disease without esophagitis    Hypothyroidism, unspecified type    H/O abdominal hysterectomy    H/O thyroidectomy      ALLERGIES:  No Known Allergies    MEDICATIONS:  STANDING MEDICATIONS  aspirin  chewable 81 milliGRAM(s) Oral daily  azithromycin  IVPB      azithromycin  IVPB 500 milliGRAM(s) IV Intermittent every 24 hours  dexAMETHasone  Injectable 6 milliGRAM(s) IV Push daily  dextrose 40% Gel 15 Gram(s) Oral once  dextrose 5%. 1000 milliLiter(s) IV Continuous <Continuous>  dextrose 5%. 1000 milliLiter(s) IV Continuous <Continuous>  dextrose 50% Injectable 25 Gram(s) IV Push once  dextrose 50% Injectable 12.5 Gram(s) IV Push once  dextrose 50% Injectable 25 Gram(s) IV Push once  enoxaparin Injectable 40 milliGRAM(s) SubCutaneous two times a day  glucagon  Injectable 1 milliGRAM(s) IntraMuscular once  insulin glargine Injectable (LANTUS) 16 Unit(s) SubCutaneous every morning  insulin lispro (ADMELOG) corrective regimen sliding scale   SubCutaneous three times a day before meals  insulin lispro Injectable (ADMELOG) 5 Unit(s) SubCutaneous three times a day before meals  levothyroxine 137 MICROGram(s) Oral daily  losartan 100 milliGRAM(s) Oral daily  melatonin 5 milliGRAM(s) Oral at bedtime  montelukast 10 milliGRAM(s) Oral daily  pantoprazole    Tablet 40 milliGRAM(s) Oral two times a day  senna 2 Tablet(s) Oral at bedtime    PRN MEDICATIONS  acetaminophen   Tablet .. 650 milliGRAM(s) Oral every 6 hours PRN  ALBUTerol    90 MICROgram(s) HFA Inhaler 2 Puff(s) Inhalation every 6 hours PRN  guaifenesin/dextromethorphan Oral Liquid 10 milliLiter(s) Oral every 6 hours PRN  polyethylene glycol 3350 17 Gram(s) Oral two times a day PRN      VITAL SIGNS: Last 24 Hours  T(C): 37.2 (15 Aug 2021 08:00), Max: 37.2 (15 Aug 2021 08:00)  T(F): 98.9 (15 Aug 2021 08:00), Max: 98.9 (15 Aug 2021 08:00)  HR: 107 (15 Aug 2021 09:03) (75 - 107)  BP: 147/66 (15 Aug 2021 09:00) (112/64 - 183/89)  BP(mean): 95 (15 Aug 2021 09:00) (82 - 128)  RR: 26 (15 Aug 2021 09:03) (20 - 27)  SpO2: 95% (15 Aug 2021 09:03) (77% - 97%)    LABS:                        12.6   15.90 )-----------( 481      ( 15 Aug 2021 07:43 )             39.0     08-15    135  |  97<L>  |  17  ----------------------------<  172<H>  4.0   |  24  |  0.7    Ca    8.1<L>      15 Aug 2021 07:43  Mg     1.9     08-15    TPro  5.7<L>  /  Alb  3.2<L>  /  TBili  1.0  /  DBili  x   /  AST  48<H>  /  ALT  48<H>  /  AlkPhos  82  08-15                  RADIOLOGY:      PHYSICAL EXAM:    GENERAL: NAD, lying in bed comfortably  CHEST/LUNG: coarse bs b/l   HEART: Regular rate and rhythm; No murmurs, rubs, or gallops  ABDOMEN: Bowel sounds present; Soft, Nontender, Nondistended.   EXTREMITIES:  2+ Peripheral Pulses, brisk capillary refill. RUE edema  NERVOUS SYSTEM:  Alert & Oriented X3, speech clear. No deficits   MSK: FROM all 4 extremities, full and equal strength  SKIN: No rashes or lesions                Assessment and Plan:   66 old female with history of hypothyroid, pre-diabetes, HTN, and asthma presents with shortness of breath starting this morning    # Shortness of breath secondary to severe COVID pneumonia+ superimposed bacterial infection  - swabbed positive on Monday, shortness of breath started 8/07  - chest xray showing stable bilateral opacities,   - currently on high flow 60/100 satting 93 with BIPAP prn  - Va duplex - No evidence of deep venous thrombosis or superficial thrombophlebitis in the bilateral lower extremities.  - Procal 8/13 - .83>.27>.07  - CRP 8/13 -204>42>51  - Ferritin 8/13 1426>2247>1574  - D-dimer 8/11 137->227  - legionella and strep ag- negative   - s/p remdesivir 8/12  - s/p toci 800 mg   - continue  dexamethasone daily  8/07 - 8/17  - continue abx - rocephin + azithromycin 8/09-8/15  - repeat cxr looks worse than prior cxr-> fu official read  - because of worsening o2 requirement. will repeat d-dimer. if elevated obtain CTA to r/o PE.   - repeat procal, crp, ferritin   - f/u ID  - f/u pulm,     # Hypothyroid  - c/w synthroid 137  - tsh - .08     # HTN  - takes losartan 100 / HCTZ 25 at home  - started on losartan 100 qd    # Asthma  - no wheezing on exam  - c/w albuterol as needed and singulair    # Hyponatremia- resolved  # Hypomag  - repleted   - fu am     # DVT PPX: lovenox 40 bid  # GI PPX: protonix BID  # Diet: DASH  # FULL CODE     MIRI SUAREZ 66y Female  MRN#: [id number]         SUBJECTIVE  Patient is a 66y old Female who presents with a chief complaint of covid pneumonia (14 Aug 2021 16:31)  Currently admitted to medicine with the primary diagnosis of COVID-19    Patient seen in bed. she reports still having SOB. no chest pain.      OBJECTIVE  PAST MEDICAL & SURGICAL HISTORY  Type 2 diabetes mellitus without complication, without long-term current use of insulin    Essential hypertension    Acute gout, unspecified cause, unspecified site    Gastroesophageal reflux disease without esophagitis    Hypothyroidism, unspecified type    H/O abdominal hysterectomy    H/O thyroidectomy      ALLERGIES:  No Known Allergies    MEDICATIONS:  STANDING MEDICATIONS  aspirin  chewable 81 milliGRAM(s) Oral daily  azithromycin  IVPB      azithromycin  IVPB 500 milliGRAM(s) IV Intermittent every 24 hours  dexAMETHasone  Injectable 6 milliGRAM(s) IV Push daily  dextrose 40% Gel 15 Gram(s) Oral once  dextrose 5%. 1000 milliLiter(s) IV Continuous <Continuous>  dextrose 5%. 1000 milliLiter(s) IV Continuous <Continuous>  dextrose 50% Injectable 25 Gram(s) IV Push once  dextrose 50% Injectable 12.5 Gram(s) IV Push once  dextrose 50% Injectable 25 Gram(s) IV Push once  enoxaparin Injectable 40 milliGRAM(s) SubCutaneous two times a day  glucagon  Injectable 1 milliGRAM(s) IntraMuscular once  insulin glargine Injectable (LANTUS) 16 Unit(s) SubCutaneous every morning  insulin lispro (ADMELOG) corrective regimen sliding scale   SubCutaneous three times a day before meals  insulin lispro Injectable (ADMELOG) 5 Unit(s) SubCutaneous three times a day before meals  levothyroxine 137 MICROGram(s) Oral daily  losartan 100 milliGRAM(s) Oral daily  melatonin 5 milliGRAM(s) Oral at bedtime  montelukast 10 milliGRAM(s) Oral daily  pantoprazole    Tablet 40 milliGRAM(s) Oral two times a day  senna 2 Tablet(s) Oral at bedtime    PRN MEDICATIONS  acetaminophen   Tablet .. 650 milliGRAM(s) Oral every 6 hours PRN  ALBUTerol    90 MICROgram(s) HFA Inhaler 2 Puff(s) Inhalation every 6 hours PRN  guaifenesin/dextromethorphan Oral Liquid 10 milliLiter(s) Oral every 6 hours PRN  polyethylene glycol 3350 17 Gram(s) Oral two times a day PRN      VITAL SIGNS: Last 24 Hours  T(C): 37.2 (15 Aug 2021 08:00), Max: 37.2 (15 Aug 2021 08:00)  T(F): 98.9 (15 Aug 2021 08:00), Max: 98.9 (15 Aug 2021 08:00)  HR: 107 (15 Aug 2021 09:03) (75 - 107)  BP: 147/66 (15 Aug 2021 09:00) (112/64 - 183/89)  BP(mean): 95 (15 Aug 2021 09:00) (82 - 128)  RR: 26 (15 Aug 2021 09:03) (20 - 27)  SpO2: 95% (15 Aug 2021 09:03) (77% - 97%)    LABS:                        12.6   15.90 )-----------( 481      ( 15 Aug 2021 07:43 )             39.0     08-15    135  |  97<L>  |  17  ----------------------------<  172<H>  4.0   |  24  |  0.7    Ca    8.1<L>      15 Aug 2021 07:43  Mg     1.9     08-15    TPro  5.7<L>  /  Alb  3.2<L>  /  TBili  1.0  /  DBili  x   /  AST  48<H>  /  ALT  48<H>  /  AlkPhos  82  08-15                  RADIOLOGY:      PHYSICAL EXAM:    GENERAL: NAD, lying in bed comfortably  CHEST/LUNG: coarse bs b/l   HEART: Regular rate and rhythm; No murmurs, rubs, or gallops  ABDOMEN: Bowel sounds present; Soft, Nontender, Nondistended.   EXTREMITIES:  2+ Peripheral Pulses, brisk capillary refill. RUE edema  NERVOUS SYSTEM:  Alert & Oriented X3, speech clear. No deficits   MSK: FROM all 4 extremities, full and equal strength  SKIN: No rashes or lesions                Assessment and Plan:   66 old female with history of hypothyroid, pre-diabetes, HTN, and asthma presents with shortness of breath starting this morning    # Shortness of breath secondary to severe COVID pneumonia+ superimposed bacterial infection  - swabbed positive on Monday, shortness of breath started 8/07  - chest xray showing stable bilateral opacities,   - currently on high flow 60/100 satting 93 with BIPAP prn  - Va duplex - No evidence of deep venous thrombosis or superficial thrombophlebitis in the bilateral lower extremities.  - Procal 8/13 - .83>.27>.07  - CRP 8/13 -204>42>51  - Ferritin 8/13 1426>2247>1574  - D-dimer 8/11 137->227  - legionella and strep ag- negative   - s/p remdesivir 8/12  - s/p toci 800 mg   - continue  dexamethasone daily  8/07 - 8/17  - continue abx - rocephin + azithromycin 8/09-8/15  - repeat cxr looks worse than prior cxr-> fu official read  - because of worsening o2 requirement. will repeat d-dimer. if elevated obtain CTA to r/o PE. CTA chest done no PE  - fu duplex upper ext  - repeat procal, crp, ferritin   - f/u ID  - f/u pulm,     # Hypothyroid  - c/w synthroid 137  - tsh - .08     # HTN  - takes losartan 100 / HCTZ 25 at home  - started on losartan 100 qd    # Asthma  - no wheezing on exam  - c/w albuterol as needed and singulair    # Hyponatremia- resolved  # Hypomag  - repleted   - fu am     # DVT PPX: lovenox 40 bid  # GI PPX: protonix BID  # Diet: DASH  # FULL CODE

## 2021-08-16 NOTE — PROGRESS NOTE ADULT - ASSESSMENT
66 year old female with history of hypothyroid, pre-diabetes, HTN, and asthma presents with shortness of breath, admitted with severe COVID 19 PNA. Unvaccinated    Acute hypoxic respiratory failure secondary to Severe COVID19 pneumonia   Asthma  Suspected VTE  Hypoxic and tachycardic overnight, placed on bipap, currently saturating 94%  s/p RDV, toci given  8/12  continue with decadron, started 8/7  Procal 0.83--->0.27, finished course of ceftriaxone and azithromycin  urine legionella and strep negative, check fungitell   blood cx NGTD  Ddimer 227--->800--->2000--->4400, LE duplex neg for DVT 8/9 and 8/15  CTA nondiagnosic, but high suspicion for PE, c/w therapeutic lovenoc  trend inflammatory markers  daily chest xray  pulm/cc following, approved for transfer in ICU given worsening 02 requirements and high risk for requiring mechanical ventilation     RUE swelling/superficial blisters on anterior forearm  IV infiltrated during CTA, blisters likely due to hot compress applied  radial pulses palpable/strong  keep extremity elevated, WARM compresses (place towel as barrier between skin and warm pack)    Hypothyroid  c/w synthroid 137  TSH 0.11, not accurate in acute illness     HTN  - takes losartan 100 / HCTZ 25 at home, currently on hold    Hyponatremia - resolved  Hypomagnesemia - repleting   f/u bmp    #Progress Note Handoff  Pending (specify): patient remains medically acute to be transferred to ICU    Family discussion: Plan of care discussed with patient, aware and agreeable   Disposition:  from home     Chela Plunkett MD  s. [phone]

## 2021-08-16 NOTE — CHART NOTE - NSCHARTNOTEFT_GEN_A_CORE
MICU Transfer Note    Transfer from: SDU  Transfer to:  ICU  Accepting physican:Dr. Red      MICU COURSE:     Patient has been admitted to the hospital for 9 days for severe covid pneumonia and unvaccinated . She received Toci 800 mg on 8/12 , completed course of RDV (8/12) and course of abx ( rocephin and azithromycin) (8/15). Over the weekend, it was noted that the patient o2 requirement was increase from HFNC 60/80 to 60/100 sating in the low 90s D-dimer at that time was significantly elevated and a CTA 8/14 was performed. However CTA was unable to rule out a PE. Overnight patient was noted to become hypoxic, tachypneic and tachycardic  on HFNC 60/100 and it was decided to place patient on continuous BIPAP. Patient is currently on BIPAP 18/10 R 24 100% sating 92%. Because of the patient clinical picture it is highly likely that a PE is present and patient was started on therapeutic Lovenox and upgraded to the ICU. Patient is currently Full code. The idea of DNR/DNI was presented to the patient and she still requiring time to decide.     ASSESSMENT & PLAN:     66 old female with history of hypothyroid, pre-diabetes, HTN, and asthma presents with shortness of breath starting this morning    # Shortness of breath secondary to severe COVID pneumonia+ superimposed bacterial infection  # suspected PE  - swabbed positive on Monday, shortness of breath started 8/07  - currently on bipap 18/10 24 100% satting 92  - Va duplex 8/09 - No evidence of deep venous thrombosis or superficial thrombophlebitis in the bilateral lower extremities.  - CTA 8/14 -> could not r/o PE due to IV infiltration. recommended repeat test.   - Procal 8/13 - .83>.27>.07  - CRP 8/13 -204>42>51  - Ferritin 8/13 1426>2247>1574  - D-dimer 8/16 137->227>4492  - legionella and strep ag- negative   - s/p remdesivir 8/12  - s/p toci 800 mg   - s/p abx - rocephin + azithromycin 8/09-8/15  - continue  dexamethasone daily  8/07 - will continue past 10 days due to worsening O2 demand  - start therapeutic lovenox  - fu duplex upper / lower ext, CXr   - FU repeat procal, crp, ferritin   - f/u ID  - f/u pulm,     # Hypothyroid  - c/w synthroid 137  - tsh - .08     # HTN  - takes losartan 100 / HCTZ 25 at home  - continue losartan 100 qd    # Asthma  - no wheezing on exam  - c/w albuterol as needed and singulair    # Hyponatremia- resolved  # Hypomag  - repleted   - fu am     # DVT PPX: lovenox 90 bid  # GI PPX: protonix BID  # Diet: DASH  # FULL CODE    For Follow-Up:    - fu duplex upper / lower ext, CXr   - FU repeat procal, crp, ferritin       Vital Signs Last 24 Hrs  T(C): 36.8 (16 Aug 2021 08:00), Max: 37.2 (15 Aug 2021 16:00)  T(F): 98.3 (16 Aug 2021 08:00), Max: 98.9 (15 Aug 2021 16:00)  HR: 114 (16 Aug 2021 11:00) (86 - 124)  BP: 154/84 (16 Aug 2021 11:00) (104/52 - 175/79)  BP(mean): 112 (16 Aug 2021 11:00) (73 - 115)  RR: 23 (16 Aug 2021 11:00) (22 - 53)  SpO2: 94% (16 Aug 2021 11:00) (90% - 100%)  I&O's Summary    15 Aug 2021 07:01  -  16 Aug 2021 07:00  --------------------------------------------------------  IN: 370 mL / OUT: 500 mL / NET: -130 mL    16 Aug 2021 07:01  -  16 Aug 2021 11:27  --------------------------------------------------------  IN: 100 mL / OUT: 0 mL / NET: 100 mL          MEDICATIONS  (STANDING):  aspirin  chewable 81 milliGRAM(s) Oral daily  dexAMETHasone  Injectable 6 milliGRAM(s) IV Push daily  dextrose 40% Gel 15 Gram(s) Oral once  dextrose 5%. 1000 milliLiter(s) (50 mL/Hr) IV Continuous <Continuous>  dextrose 5%. 1000 milliLiter(s) (100 mL/Hr) IV Continuous <Continuous>  dextrose 50% Injectable 25 Gram(s) IV Push once  dextrose 50% Injectable 12.5 Gram(s) IV Push once  dextrose 50% Injectable 25 Gram(s) IV Push once  enoxaparin Injectable 90 milliGRAM(s) SubCutaneous two times a day  glucagon  Injectable 1 milliGRAM(s) IntraMuscular once  insulin glargine Injectable (LANTUS) 16 Unit(s) SubCutaneous every morning  insulin lispro (ADMELOG) corrective regimen sliding scale   SubCutaneous three times a day before meals  insulin lispro Injectable (ADMELOG) 5 Unit(s) SubCutaneous three times a day before meals  levothyroxine 137 MICROGram(s) Oral daily  losartan 100 milliGRAM(s) Oral daily  melatonin 5 milliGRAM(s) Oral at bedtime  montelukast 10 milliGRAM(s) Oral daily  pantoprazole    Tablet 40 milliGRAM(s) Oral two times a day  senna 2 Tablet(s) Oral at bedtime  silver sulfADIAZINE 1% Cream 1 Application(s) Topical three times a day    MEDICATIONS  (PRN):  acetaminophen   Tablet .. 650 milliGRAM(s) Oral every 6 hours PRN Temp greater or equal to 38C (100.4F), Mild Pain (1 - 3)  ALBUTerol    90 MICROgram(s) HFA Inhaler 2 Puff(s) Inhalation every 6 hours PRN Shortness of Breath and/or Wheezing  guaifenesin/dextromethorphan Oral Liquid 10 milliLiter(s) Oral every 6 hours PRN Cough  polyethylene glycol 3350 17 Gram(s) Oral two times a day PRN Constipation        LABS                                            13.2                  Neurophils% (auto):   85.2   (08-16 @ 04:27):    18.07)-----------(455          Lymphocytes% (auto):  8.4                                           40.9                   Eosinphils% (auto):   3.2      Manual%: Neutrophils x    ; Lymphocytes x    ; Eosinophils x    ; Bands%: x    ; Blasts x                                    137    |  99     |  18                  Calcium: 8.2   / iCa: x      (08-16 @ 04:27)    ----------------------------<  103       Magnesium: 1.7                              3.7     |  23     |  0.8              Phosphorous: x        TPro  5.7    /  Alb  3.2    /  TBili  1.0    /  DBili  x      /  AST  48     /  ALT  46     /  AlkPhos  86     16 Aug 2021 04:27

## 2021-08-16 NOTE — PROGRESS NOTE ADULT - SUBJECTIVE AND OBJECTIVE BOX
SUBJECTIVE:    Patient is a 66y old Female who presents with a chief complaint of covid pneumonia (15 Aug 2021 12:37)    Currently admitted to medicine with the primary diagnosis of: COVID - 19    Today is hospital day 9d.     Overnight Events:     patient was persistently tachycardic overnight. EKG shows sinus tachycardia. Patient was placed on BIPAP around 9pm and saturated on 92 in the am. Highly suspicious for PE even though CTA was negative. Was started on Lovenox therapeutic. patient was extremely anxious about the idea of intubation and requiring time.       PAST MEDICAL & SURGICAL HISTORY  Type 2 diabetes mellitus without complication, without long-term current use of insulin    Essential hypertension    Acute gout, unspecified cause, unspecified site    Gastroesophageal reflux disease without esophagitis    Hypothyroidism, unspecified type    H/O abdominal hysterectomy    H/O thyroidectomy    ALLERGIES:  Milk (Unknown)  No Known Drug Allergies  Nuts (Unknown)  Seafood (Unknown)    MEDICATIONS:  STANDING MEDICATIONS  aspirin  chewable 81 milliGRAM(s) Oral daily  azithromycin  IVPB      azithromycin  IVPB 500 milliGRAM(s) IV Intermittent every 24 hours  dexAMETHasone  Injectable 6 milliGRAM(s) IV Push daily  enoxaparin Injectable 90 milliGRAM(s) SubCutaneous two times a day  insulin glargine Injectable (LANTUS) 16 Unit(s) SubCutaneous every morning  insulin lispro (ADMELOG) corrective regimen sliding scale   SubCutaneous three times a day before meals  insulin lispro Injectable (ADMELOG) 5 Unit(s) SubCutaneous three times a day before meals  levothyroxine 137 MICROGram(s) Oral daily  losartan 100 milliGRAM(s) Oral daily  melatonin 5 milliGRAM(s) Oral at bedtime  montelukast 10 milliGRAM(s) Oral daily  pantoprazole    Tablet 40 milliGRAM(s) Oral two times a day  senna 2 Tablet(s) Oral at bedtime  silver sulfADIAZINE 1% Cream 1 Application(s) Topical three times a day    PRN MEDICATIONS  acetaminophen   Tablet .. 650 milliGRAM(s) Oral every 6 hours PRN  ALBUTerol    90 MICROgram(s) HFA Inhaler 2 Puff(s) Inhalation every 6 hours PRN  guaifenesin/dextromethorphan Oral Liquid 10 milliLiter(s) Oral every 6 hours PRN  polyethylene glycol 3350 17 Gram(s) Oral two times a day PRN    VITALS:   ICU Vital Signs Last 24 Hrs  T(C): 36.8 (16 Aug 2021 08:00), Max: 37.2 (15 Aug 2021 16:00)  T(F): 98.3 (16 Aug 2021 08:00), Max: 98.9 (15 Aug 2021 16:00)  HR: 124 (16 Aug 2021 08:00) (86 - 124)  BP: 122/78 (16 Aug 2021 08:00) (104/52 - 175/79)  BP(mean): 80 (16 Aug 2021 07:00) (73 - 115)  RR: 30 (16 Aug 2021 08:00) (22 - 53)  SpO2: 90% (16 Aug 2021 08:00) (90% - 100%)      LABS:                          13.2   18.07 )-----------( 455      ( 16 Aug 2021 04:27 )             40.9 08-16    137  |  99  |  18  ----------------------------<  103  3.7   |  23  |  0.8  Ca    8.2      16 Aug 2021 04:27  Mg     1.7     08-16  TPro  5.7  /  Alb  3.2  /  TBili  1.0  /  DBili  x   /  AST  48  /  ALT  46  /  AlkPhos  86  08-16    Procalcitonin, Serum: 0.07 (08-13)  Procalcitonin, Serum: 0.27 (08-11)  Procalcitonin, Serum: 0.83 (08-07)    C-Reactive Protein, Serum: 51 (08-13)  C-Reactive Protein, Serum: 42 (08-11)  C-Reactive Protein, Serum: 204 (08-07)    Ferritin, Serum: 1574 (08-13)  Ferritin, Serum: 2247 (08-11)  Ferritin, Serum: 1426 (08-07)      D-Dimer Assay, Quantitative: 4492 (08-16)  D-Dimer Assay, Quantitative: 2220 (08-15)  D-Dimer Assay, Quantitative: 882 (08-14)  D-Dimer Assay, Quantitative: 227 (08-11)  D-Dimer Assay, Quantitative: 137 (08-07)                          13.2   18.07 )-----------( 455      ( 16 Aug 2021 04:27 )             40.9     08-16    137  |  99  |  18  ----------------------------<  103<H>  3.7   |  23  |  0.8    Ca    8.2<L>      16 Aug 2021 04:27  Mg     1.7     08-16    TPro  5.7<L>  /  Alb  3.2<L>  /  TBili  1.0  /  DBili  x   /  AST  48<H>  /  ALT  46<H>  /  AlkPhos  86  08-16        ABG - ( 16 Aug 2021 06:15 )  pH, Arterial: 7.47  pH, Blood: x     /  pCO2: 38    /  pO2: 61    / HCO3: 28    / Base Excess: 3.8   /  SaO2: 92          RADIOLOGY:    < from: CT Angio Chest PE Protocol w/ IV Cont (08.14.21 @ 20:57) >  IMPRESSION:    Nondiagnostic CTA of the pulmonary arteries. Considerrepeat examination or nuclear medicine VQ scan as clinically indicated.    Constellation of pulmonary findings which can be seen with atypical viral pneumonia.    < end of copied text >      PHYSICAL EXAM:    GENERAL: NAD, lying. very anxious  CHEST/LUNG: B/l coarse breath sounds  HEART: tachycardic. no murmurs  ABDOMEN: Bowel sounds present; Soft, Nontender, Nondistended.  EXTREMITIES: no edema  NERVOUS SYSTEM:  Alert & Oriented X3, speech clear. No deficits   MSK: FROM all 4 extremities, full and equal strength

## 2021-08-16 NOTE — PROGRESS NOTE ADULT - ASSESSMENT
IMPRESSION:    Acute hypoxemic resp failure on bipap 100%  Severe covid pneumonia  Probable bacterial superinfection  Hyponatremia-resolved  HO Gastritis (recently diagnosed)  HO diverticulitis    PLAN:    CNS: Avoid CNS depressant    HEENT:  Oral care    PULMONARY:  HOB @ 45 degrees, BiPAP during sleep.  Wean O2 as tolerated. increase Decadron to 6 q 12h    CARDIOVASCULAR: Avoid volume overload , iv lasix daily    GI: Protonix BID,  Feeding.  Bowel regimen    RENAL:  F/u  lytes.  Correct as needed. accurate I/O, trend CMP    INFECTIOUS DISEASE:  Decadron 6 mg Daily D#8. Finish ABX course total 7 days.      HEMATOLOGICAL:  DVT prophylaxis with LMWH     ENDOCRINE:  Follow up FS.  Insulin protocol if needed.    CODE STATUS: FULL CODE    DISPOSITION: MICU

## 2021-08-16 NOTE — CHART NOTE - NSCHARTNOTEFT_GEN_A_CORE
Called by RN that patient is persistently tachycardic. Patient had earlier not tolerated HFNC, switched to BIPAP around 9pm.   Patient is alert and oriented. She reports feeling anxious and unable to sleep  EKG shows sinus tachycardia  Upon chart review, D-dimer increased from 800  > 2200. Patient is tachycardic to 110-120, tachypneic to 30s. BP stable.   Recent CTA (8/14) nondiagnostic. Recommended repeating CTA or Perfusion study.   Stat CXR obtained. Increased Lovenox to 90mg BID given episode of desaturation, sinus tachycardia and elevated dimer. 500cc bolus given. Continue to monitor.

## 2021-08-16 NOTE — PROGRESS NOTE ADULT - SUBJECTIVE AND OBJECTIVE BOX
Over Night Events: events noted, worsening status on bipap 100%, afebrile    PHYSICAL EXAM    ICU Vital Signs Last 24 Hrs  T(C): 36.8 (16 Aug 2021 16:00), Max: 37.2 (16 Aug 2021 03:00)  T(F): 98.2 (16 Aug 2021 16:00), Max: 98.9 (16 Aug 2021 03:00)  HR: 104 (16 Aug 2021 18:00) (92 - 124)  BP: 147/76 (16 Aug 2021 18:00) (104/52 - 163/84)  BP(mean): 104 (16 Aug 2021 18:00) (73 - 112)  RR: 33 (16 Aug 2021 18:00) (22 - 53)  SpO2: 96% (16 Aug 2021 18:00) (90% - 97%)      General: ill looking  HEENT: SOCO             Lymph Nodes: No cervical LN   Lungs: Bilateral crackles  Cardiovascular: Regular   Abdomen: Soft, Positive BS  Extremities: No clubbing   Skin: Warm  Neurological: Non focal       08-15-21 @ 07:01  -  08-16-21 @ 07:00  --------------------------------------------------------  IN:    IV PiggyBack: 250 mL    Oral Fluid: 120 mL  Total IN: 370 mL    OUT:    Voided (mL): 500 mL  Total OUT: 500 mL    Total NET: -130 mL      08-16-21 @ 07:01  -  08-16-21 @ 18:45  --------------------------------------------------------  IN:    IV PiggyBack: 100 mL    Oral Fluid: 100 mL  Total IN: 200 mL    OUT:    Voided (mL): 350 mL  Total OUT: 350 mL    Total NET: -150 mL          LABS:                          13.2   18.07 )-----------( 455      ( 16 Aug 2021 04:27 )             40.9                                               08-16    137  |  99  |  18  ----------------------------<  103<H>  3.7   |  23  |  0.8    Ca    8.2<L>      16 Aug 2021 04:27  Mg     1.7     08-16    TPro  5.7<L>  /  Alb  3.2<L>  /  TBili  1.0  /  DBili  x   /  AST  48<H>  /  ALT  46<H>  /  AlkPhos  86  08-16                                                                                           LIVER FUNCTIONS - ( 16 Aug 2021 04:27 )  Alb: 3.2 g/dL / Pro: 5.7 g/dL / ALK PHOS: 86 U/L / ALT: 46 U/L / AST: 48 U/L / GGT: x                                                                                                                                   ABG - ( 16 Aug 2021 06:15 )  pH, Arterial: 7.47  pH, Blood: x     /  pCO2: 38    /  pO2: 61    / HCO3: 28    / Base Excess: 3.8   /  SaO2: 92                  MEDICATIONS  (STANDING):  aspirin  chewable 81 milliGRAM(s) Oral daily  dexAMETHasone  Injectable 6 milliGRAM(s) IV Push daily  dextrose 40% Gel 15 Gram(s) Oral once  dextrose 5%. 1000 milliLiter(s) (50 mL/Hr) IV Continuous <Continuous>  dextrose 5%. 1000 milliLiter(s) (100 mL/Hr) IV Continuous <Continuous>  dextrose 50% Injectable 25 Gram(s) IV Push once  dextrose 50% Injectable 12.5 Gram(s) IV Push once  dextrose 50% Injectable 25 Gram(s) IV Push once  enoxaparin Injectable 90 milliGRAM(s) SubCutaneous two times a day  glucagon  Injectable 1 milliGRAM(s) IntraMuscular once  insulin glargine Injectable (LANTUS) 16 Unit(s) SubCutaneous every morning  insulin lispro (ADMELOG) corrective regimen sliding scale   SubCutaneous three times a day before meals  insulin lispro Injectable (ADMELOG) 5 Unit(s) SubCutaneous three times a day before meals  levothyroxine 137 MICROGram(s) Oral daily  losartan 100 milliGRAM(s) Oral daily  melatonin 5 milliGRAM(s) Oral at bedtime  montelukast 10 milliGRAM(s) Oral daily  pantoprazole    Tablet 40 milliGRAM(s) Oral two times a day  senna 2 Tablet(s) Oral at bedtime  silver sulfADIAZINE 1% Cream 1 Application(s) Topical three times a day    MEDICATIONS  (PRN):  acetaminophen   Tablet .. 650 milliGRAM(s) Oral every 6 hours PRN Temp greater or equal to 38C (100.4F), Mild Pain (1 - 3)  ALBUTerol    90 MICROgram(s) HFA Inhaler 2 Puff(s) Inhalation every 6 hours PRN Shortness of Breath and/or Wheezing  guaifenesin/dextromethorphan Oral Liquid 10 milliLiter(s) Oral every 6 hours PRN Cough  polyethylene glycol 3350 17 Gram(s) Oral two times a day PRN Constipation      Xrays:                                                                                     ECHO

## 2021-08-16 NOTE — PROGRESS NOTE ADULT - ASSESSMENT
66 old female with history of hypothyroid, pre-diabetes, HTN, and asthma presents with shortness of breath starting this morning    # Shortness of breath secondary to severe COVID pneumonia+ superimposed bacterial infection  # suspected PE  - swabbed positive on Monday, shortness of breath started 8/07  - currently on bipap 18/10 24 100% satting 92  - Va duplex 8/09 - No evidence of deep venous thrombosis or superficial thrombophlebitis in the bilateral lower extremities.  - CTA 8/14 -> could not r/o PE due to IV infiltration. recommended repeat test.   - Procal 8/13 - .83>.27>.07  - CRP 8/13 -204>42>51  - Ferritin 8/13 1426>2247>1574  - D-dimer 8/16 137->227>4492  - legionella and strep ag- negative   - s/p remdesivir 8/12  - s/p toci 800 mg   - s/p abx - rocephin + azithromycin 8/09-8/15  - continue  dexamethasone daily  8/07 - will continue past 10 days due to worsening O2 demand  - start therapeutic lovenox  - fu duplex upper / lower ext, CXr   - FU repeat procal, crp, ferritin   - f/u ID  - f/u pulm,     # Hypothyroid  - c/w synthroid 137  - tsh - .08     # HTN  - takes losartan 100 / HCTZ 25 at home  - continue losartan 100 qd    # Asthma  - no wheezing on exam  - c/w albuterol as needed and singulair    # Hyponatremia- resolved  # Hypomag  - repleted   - fu am     # DVT PPX: lovenox 90 bid  # GI PPX: protonix BID  # Diet: DASH  # FULL CODE

## 2021-08-16 NOTE — PROGRESS NOTE ADULT - SUBJECTIVE AND OBJECTIVE BOX
SUHASS RACHELJAYA  66y Female    CHIEF COMPLAINT:    Patient is a 66y old  Female who presents with a chief complaint of covid pneumonia (16 Aug 2021 08:49)      INTERVAL HPI/OVERNIGHT EVENTS:    Patient seen and examined. increasing oxygen requirements overnight with tachycardia. now on bipap, anxious    ROS: All other systems are negative.    Vital Signs:    T(F): 98.3 (08-16-21 @ 08:00), Max: 98.9 (08-15-21 @ 16:00)  HR: 114 (08-16-21 @ 11:00) (86 - 124)  BP: 154/84 (08-16-21 @ 11:00) (104/52 - 175/79)  RR: 23 (08-16-21 @ 11:00) (22 - 53)  SpO2: 94% (08-16-21 @ 11:00) (90% - 99%)    15 Aug 2021 07:01  -  16 Aug 2021 07:00  --------------------------------------------------------  IN: 370 mL / OUT: 500 mL / NET: -130 mL    16 Aug 2021 07:01  -  16 Aug 2021 12:15  --------------------------------------------------------  IN: 100 mL / OUT: 0 mL / NET: 100 mL    POCT Blood Glucose.: 121 mg/dL (16 Aug 2021 07:44)  POCT Blood Glucose.: 122 mg/dL (15 Aug 2021 22:29)  POCT Blood Glucose.: 80 mg/dL (15 Aug 2021 17:31)  POCT Blood Glucose.: 165 mg/dL (15 Aug 2021 12:19)    PHYSICAL EXAM:    GENERAL:  mild respiratory distress and anxious  SKIN: No rashes or lesions  HEENT: Atraumatic. Normocephalic.    NECK: Supple, No JVD.   PULMONARY: coarse/decreased breath sounds. No wheezing. No rales  CVS: Normal S1, S2. Rate and Rhythm are regular.    ABDOMEN/GI: Soft, Nontender, Nondistended; BS present  MSK:  No edema B/L LE. RUE edema improving   NEUROLOGIC: moves all extremities  PSYCH: Alert & oriented x 3     Consultant(s) Notes Reviewed:  [x ] YES  [ ] NO  Care Discussed with Consultants/Other Providers [ x] YES  [ ] NO    LABS:                        13.2   18.07 )-----------( 455      ( 16 Aug 2021 04:27 )             40.9     137  |  99  |  18  ----------------------------<  103<H>  3.7   |  23  |  0.8    Ca    8.2<L>      16 Aug 2021 04:27  Mg     1.7     08-16    TPro  5.7<L>  /  Alb  3.2<L>  /  TBili  1.0  /  DBili  x   /  AST  48<H>  /  ALT  46<H>  /  AlkPhos  86  08-16    RADIOLOGY & ADDITIONAL TESTS:  Imaging or report Personally Reviewed:  [x] YES  [ ] NO  EKG reviewed: [x] YES  [ ] NO    Medications:  Standing  aspirin  chewable 81 milliGRAM(s) Oral daily  dexAMETHasone  Injectable 6 milliGRAM(s) IV Push daily  dextrose 40% Gel 15 Gram(s) Oral once  dextrose 5%. 1000 milliLiter(s) IV Continuous <Continuous>  dextrose 5%. 1000 milliLiter(s) IV Continuous <Continuous>  dextrose 50% Injectable 25 Gram(s) IV Push once  dextrose 50% Injectable 12.5 Gram(s) IV Push once  dextrose 50% Injectable 25 Gram(s) IV Push once  enoxaparin Injectable 90 milliGRAM(s) SubCutaneous two times a day  glucagon  Injectable 1 milliGRAM(s) IntraMuscular once  insulin glargine Injectable (LANTUS) 16 Unit(s) SubCutaneous every morning  insulin lispro (ADMELOG) corrective regimen sliding scale   SubCutaneous three times a day before meals  insulin lispro Injectable (ADMELOG) 5 Unit(s) SubCutaneous three times a day before meals  levothyroxine 137 MICROGram(s) Oral daily  losartan 100 milliGRAM(s) Oral daily  melatonin 5 milliGRAM(s) Oral at bedtime  montelukast 10 milliGRAM(s) Oral daily  pantoprazole    Tablet 40 milliGRAM(s) Oral two times a day  senna 2 Tablet(s) Oral at bedtime  silver sulfADIAZINE 1% Cream 1 Application(s) Topical three times a day    PRN Meds  acetaminophen   Tablet .. 650 milliGRAM(s) Oral every 6 hours PRN  ALBUTerol    90 MICROgram(s) HFA Inhaler 2 Puff(s) Inhalation every 6 hours PRN  guaifenesin/dextromethorphan Oral Liquid 10 milliLiter(s) Oral every 6 hours PRN  polyethylene glycol 3350 17 Gram(s) Oral two times a day PRN

## 2021-08-17 NOTE — PROGRESS NOTE ADULT - SUBJECTIVE AND OBJECTIVE BOX
SUBJECTIVE:    Patient is a 66y old Female who presents with a chief complaint of covid pneumonia (15 Aug 2021 12:37)    Currently admitted to medicine with the primary diagnosis of: COVID - 19    Today is hospital day 10d.     Overnight Events:     Patient is still on BIPAP 100% satting 93%. has moments where he o2 levels drop to 88%. Patient is very anxious.        PAST MEDICAL & SURGICAL HISTORY  Type 2 diabetes mellitus without complication, without long-term current use of insulin    Essential hypertension    Acute gout, unspecified cause, unspecified site    Gastroesophageal reflux disease without esophagitis    Hypothyroidism, unspecified type    H/O abdominal hysterectomy    H/O thyroidectomy    ALLERGIES:  Milk (Unknown)  No Known Drug Allergies  Nuts (Unknown)  Seafood (Unknown)    MEDICATIONS:  MEDICATIONS  (STANDING):  aspirin  chewable 81 milliGRAM(s) Oral daily  dexAMETHasone  Injectable 6 milliGRAM(s) IV Push every 12 hours  dextrose 40% Gel 15 Gram(s) Oral once  dextrose 5%. 1000 milliLiter(s) (50 mL/Hr) IV Continuous <Continuous>  dextrose 5%. 1000 milliLiter(s) (100 mL/Hr) IV Continuous <Continuous>  dextrose 50% Injectable 25 Gram(s) IV Push once  dextrose 50% Injectable 12.5 Gram(s) IV Push once  dextrose 50% Injectable 25 Gram(s) IV Push once  enoxaparin Injectable 90 milliGRAM(s) SubCutaneous two times a day  furosemide   Injectable 40 milliGRAM(s) IV Push daily  glucagon  Injectable 1 milliGRAM(s) IntraMuscular once  insulin glargine Injectable (LANTUS) 16 Unit(s) SubCutaneous every morning  insulin lispro (ADMELOG) corrective regimen sliding scale   SubCutaneous three times a day before meals  insulin lispro Injectable (ADMELOG) 5 Unit(s) SubCutaneous three times a day before meals  levothyroxine 137 MICROGram(s) Oral daily  LORazepam   Injectable 0.5 milliGRAM(s) IV Push once  losartan 100 milliGRAM(s) Oral daily  melatonin 5 milliGRAM(s) Oral at bedtime  montelukast 10 milliGRAM(s) Oral daily  pantoprazole    Tablet 40 milliGRAM(s) Oral two times a day  senna 2 Tablet(s) Oral at bedtime  silver sulfADIAZINE 1% Cream 1 Application(s) Topical three times a day    MEDICATIONS  (PRN):  acetaminophen   Tablet .. 650 milliGRAM(s) Oral every 6 hours PRN Temp greater or equal to 38C (100.4F), Mild Pain (1 - 3)  ALBUTerol    90 MICROgram(s) HFA Inhaler 2 Puff(s) Inhalation every 6 hours PRN Shortness of Breath and/or Wheezing  guaifenesin/dextromethorphan Oral Liquid 10 milliLiter(s) Oral every 6 hours PRN Cough  polyethylene glycol 3350 17 Gram(s) Oral two times a day PRN Constipation      VITALS:     ICU Vital Signs Last 24 Hrs  T(C): 36.3 (17 Aug 2021 12:00), Max: 37.6 (17 Aug 2021 03:30)  T(F): 97.4 (17 Aug 2021 12:00), Max: 99.7 (17 Aug 2021 03:30)  HR: 114 (17 Aug 2021 12:00) (92 - 124)  BP: 122/62 (17 Aug 2021 12:00) (112/71 - 161/92)  BP(mean): 84 (17 Aug 2021 12:00) (84 - 115)  RR: 23 (17 Aug 2021 12:00) (23 - 61)  SpO2: 92% (17 Aug 2021 12:00) (88% - 97%)        LABS:                          12.8   16.57 )-----------( 440      ( 17 Aug 2021 05:34 )             39.7 08-17    140  |  101  |  19  ----------------------------<  117  3.8   |  25  |  0.6  Ca    8.2      17 Aug 2021 05:34  Mg     2.1     08-17  TPro  5.6  /  Alb  3.5  /  TBili  0.9  /  DBili  x   /  AST  39  /  ALT  37  /  AlkPhos  96  08-17    Procalcitonin, Serum: 0.10 (08-16)  Procalcitonin, Serum: 0.05 (08-15)  Procalcitonin, Serum: 0.07 (08-13)  Procalcitonin, Serum: 0.27 (08-11)  Procalcitonin, Serum: 0.83 (08-07)    C-Reactive Protein, Serum: 7 (08-16)  C-Reactive Protein, Serum: 11 (08-15)  C-Reactive Protein, Serum: 51 (08-13)  C-Reactive Protein, Serum: 42 (08-11)  C-Reactive Protein, Serum: 204 (08-07)    Ferritin, Serum: 1359 (08-15)  Ferritin, Serum: 1574 (08-13)  Ferritin, Serum: 2247 (08-11)  Ferritin, Serum: 1426 (08-07)      D-Dimer Assay, Quantitative: 4492 (08-16)  D-Dimer Assay, Quantitative: 2220 (08-15)  D-Dimer Assay, Quantitative: 882 (08-14)  D-Dimer Assay, Quantitative: 227 (08-11)  D-Dimer Assay, Quantitative: 137 (08-07)                            13.2   18.07 )-----------( 455      ( 16 Aug 2021 04:27 )             40.9 08-16    137  |  99  |  18  ----------------------------<  103  3.7   |  23  |  0.8  Ca    8.2      16 Aug 2021 04:27  Mg     1.7     08-16  TPro  5.7  /  Alb  3.2  /  TBili  1.0  /  DBili  x   /  AST  48  /  ALT  46  /  AlkPhos  86  08-16    Procalcitonin, Serum: 0.07 (08-13)  Procalcitonin, Serum: 0.27 (08-11)  Procalcitonin, Serum: 0.83 (08-07)    C-Reactive Protein, Serum: 51 (08-13)  C-Reactive Protein, Serum: 42 (08-11)  C-Reactive Protein, Serum: 204 (08-07)    Ferritin, Serum: 1574 (08-13)  Ferritin, Serum: 2247 (08-11)  Ferritin, Serum: 1426 (08-07)      D-Dimer Assay, Quantitative: 4492 (08-16)  D-Dimer Assay, Quantitative: 2220 (08-15)  D-Dimer Assay, Quantitative: 882 (08-14)  D-Dimer Assay, Quantitative: 227 (08-11)  D-Dimer Assay, Quantitative: 137 (08-07)                          13.2   18.07 )-----------( 455      ( 16 Aug 2021 04:27 )             40.9     08-16    137  |  99  |  18  ----------------------------<  103<H>  3.7   |  23  |  0.8    Ca    8.2<L>      16 Aug 2021 04:27  Mg     1.7     08-16    TPro  5.7<L>  /  Alb  3.2<L>  /  TBili  1.0  /  DBili  x   /  AST  48<H>  /  ALT  46<H>  /  AlkPhos  86  08-16        ABG - ( 16 Aug 2021 06:15 )  pH, Arterial: 7.47  pH, Blood: x     /  pCO2: 38    /  pO2: 61    / HCO3: 28    / Base Excess: 3.8   /  SaO2: 92          RADIOLOGY:    < from: Xray Chest 1 View- PORTABLE-Urgent (Xray Chest 1 View- PORTABLE-Urgent .) (08.16.21 @ 05:33) >  Impression:    No significant change in bilateral opacities, trace pleural effusions.    --- End of Report ---    < end of copied text >      < from: [facility] Duplex Upper Ext Vein Scan, Right (08.15.21 @ 17:55) >  Impression:    No evidence of deep or superficial thrombosis in the right upper extremity.    ICD-10:M79.89    < end of copied text >  < from: [facility] Duplex Lower Ext Vein Scan, Bilat (08.15.21 @ 17:57) >  Impression:    Noevidence of deep venous thrombosis or superficial thrombophlebitis in the bilateral lower extremities.    ICD-10:M79.89    < end of copied text >  < from: CT Angio Chest PE Protocol w/ IV Cont (08.14.21 @ 20:57) >  IMPRESSION:    Nondiagnostic CTA of the pulmonary arteries. Considerrepeat examination or nuclear medicine VQ scan as clinically indicated.    Constellation of pulmonary findings which can be seen with atypical viral pneumonia.    < end of copied text >      PHYSICAL EXAM:    GENERAL: NAD, lying. very anxious  CHEST/LUNG: B/l coarse breath sounds  HEART: tachycardic. no murmurs  ABDOMEN: Bowel sounds present; Soft, Nontender, Nondistended.  EXTREMITIES: no edema  NERVOUS SYSTEM:  Alert & Oriented X3, speech clear. No deficits   MSK: FROM all 4 extremities, full and equal strength

## 2021-08-17 NOTE — CHART NOTE - NSCHARTNOTEFT_GEN_A_CORE
Called by RN that patient desatted to 85% on bipap 100%.     When seeing the patient at bedside she was satting 95% but using abdominal muscles for breathing. Patient still refusing intubation. Family also noted to not want intubation as well.

## 2021-08-17 NOTE — PROGRESS NOTE ADULT - ASSESSMENT
66 old female with history of hypothyroid, pre-diabetes, HTN, and asthma presents with shortness of breath starting this morning    # Shortness of breath secondary to severe COVID pneumonia+ superimposed bacterial infection  # suspected PE  - swabbed positive on Monday, shortness of breath started 8/07  - currently on bipap 18/10 24 100% satting 92  - [facility] /UE duplex 8/15 - no evidence of dvt  - CTA 8/14 -> could not r/o PE due to IV infiltration. recommended repeat test.   - Procal 8/16 - .83>.27>.07>.10  - CRP 8/16 -204>42>51>7  - Ferritin 8/15 1426>2247>1574>1359  - D-dimer 8/16 137->227>4492  - legionella and strep ag- negative   - s/p remdesivir 8/12  - s/p toci 800 mg   - s/p abx - rocephin + azithromycin 8/09-8/15  - continue  dexamethasone 6 mg BID   - continue therapeutic lovenox  - started lasix  - repeat procal, crp, ferritin , d-dimer  - f/u ID  - f/u pulm,     # Hypothyroid  - c/w synthroid 137  - tsh - .08     # HTN  - takes losartan 100 / HCTZ 25 at home  - continue losartan 100 qd    # Asthma  - c/w albuterol as needed and singulair    # Hyponatremia- resolved  # Hypomag- resolved  - fu am     # DVT PPX: lovenox 90 bid  # GI PPX: protonix BID  # Diet: DASH  # FULL CODE

## 2021-08-17 NOTE — PROGRESS NOTE ADULT - SUBJECTIVE AND OBJECTIVE BOX
OVERNIGHT EVENTS: events noted, on BIPAP 100%    Vital Signs Last 24 Hrs  T(C): 37.6 (17 Aug 2021 03:30), Max: 37.6 (17 Aug 2021 03:30)  T(F): 99.7 (17 Aug 2021 03:30), Max: 99.7 (17 Aug 2021 03:30)  HR: 122 (17 Aug 2021 04:15) (92 - 124)  BP: 158/81 (17 Aug 2021 04:15) (110/64 - 161/92)  BP(mean): 113 (17 Aug 2021 04:15) (80 - 115)  RR: 55 (17 Aug 2021 04:15) (23 - 61)  SpO2: 95% (17 Aug 2021 04:15) (88% - 97%)    PHYSICAL EXAMINATION:    GENERAL: Ill looking, tachypneic    HEENT: Head is normocephalic and atraumatic.     NECK: Supple.    LUNGS: bl rhonchi    HEART: Regular rate and rhythm without murmur.    ABDOMEN: Soft, nontender, and nondistended.      EXTREMITIES: Without any cyanosis, clubbing, rash, lesions or edema.    NEUROLOGIC: Grossly intact.    SKIN: No ulceration or induration present.      LABS:                        12.8   16.57 )-----------( 440      ( 17 Aug 2021 05:34 )             39.7     08-17    140  |  101  |  19  ----------------------------<  117<H>  3.8   |  25  |  0.6<L>    Ca    8.2<L>      17 Aug 2021 05:34  Mg     2.1     08-17    TPro  5.6<L>  /  Alb  3.5  /  TBili  0.9  /  DBili  x   /  AST  39  /  ALT  37  /  AlkPhos  96  08-17        ABG - ( 17 Aug 2021 04:22 )  pH, Arterial: 7.46  pH, Blood: x     /  pCO2: 40    /  pO2: 58    / HCO3: 28    / Base Excess: 4.0   /  SaO2: 90                          Procalcitonin, Serum: 0.10 ng/mL (08-16-21 @ 04:27)  Procalcitonin, Serum: 0.05 ng/mL (08-15-21 @ 07:43)        08-15-21 @ 07:01  -  08-16-21 @ 07:00  --------------------------------------------------------  IN: 370 mL / OUT: 500 mL / NET: -130 mL    08-16-21 @ 07:01  -  08-17-21 @ 06:16  --------------------------------------------------------  IN: 200 mL / OUT: 350 mL / NET: -150 mL        MICROBIOLOGY:      MEDICATIONS  (STANDING):  aspirin  chewable 81 milliGRAM(s) Oral daily  dexAMETHasone  Injectable 6 milliGRAM(s) IV Push every 12 hours  dextrose 40% Gel 15 Gram(s) Oral once  dextrose 5%. 1000 milliLiter(s) (50 mL/Hr) IV Continuous <Continuous>  dextrose 5%. 1000 milliLiter(s) (100 mL/Hr) IV Continuous <Continuous>  dextrose 50% Injectable 25 Gram(s) IV Push once  dextrose 50% Injectable 12.5 Gram(s) IV Push once  dextrose 50% Injectable 25 Gram(s) IV Push once  enoxaparin Injectable 90 milliGRAM(s) SubCutaneous two times a day  furosemide   Injectable 40 milliGRAM(s) IV Push daily  glucagon  Injectable 1 milliGRAM(s) IntraMuscular once  insulin glargine Injectable (LANTUS) 16 Unit(s) SubCutaneous every morning  insulin lispro (ADMELOG) corrective regimen sliding scale   SubCutaneous three times a day before meals  insulin lispro Injectable (ADMELOG) 5 Unit(s) SubCutaneous three times a day before meals  levothyroxine 137 MICROGram(s) Oral daily  losartan 100 milliGRAM(s) Oral daily  melatonin 5 milliGRAM(s) Oral at bedtime  montelukast 10 milliGRAM(s) Oral daily  pantoprazole    Tablet 40 milliGRAM(s) Oral two times a day  senna 2 Tablet(s) Oral at bedtime  silver sulfADIAZINE 1% Cream 1 Application(s) Topical three times a day    MEDICATIONS  (PRN):  acetaminophen   Tablet .. 650 milliGRAM(s) Oral every 6 hours PRN Temp greater or equal to 38C (100.4F), Mild Pain (1 - 3)  ALBUTerol    90 MICROgram(s) HFA Inhaler 2 Puff(s) Inhalation every 6 hours PRN Shortness of Breath and/or Wheezing  guaifenesin/dextromethorphan Oral Liquid 10 milliLiter(s) Oral every 6 hours PRN Cough  polyethylene glycol 3350 17 Gram(s) Oral two times a day PRN Constipation      RADIOLOGY & ADDITIONAL STUDIES:

## 2021-08-17 NOTE — PROGRESS NOTE ADULT - ASSESSMENT
IMPRESSION:    Acute hypoxemic resp failure on bipap 100%  Severe covid pneumonia  Probable bacterial superinfection  Hyponatremia-resolved  HO Gastritis (recently diagnosed)  HO diverticulitis    PLAN:    CNS: Avoid CNS depressant    HEENT:  Oral care    PULMONARY:  HOB @ 45 degrees, BiPAP/ HHFNC as tolerated.  Wean O2 as tolerated. Decadron to 6 q 12h    CARDIOVASCULAR: Avoid volume overload , iv lasix daily    GI: Protonix BID,  Feeding.  Bowel regimen    RENAL:  F/u  lytes.  Correct as needed. accurate I/O, trend CMP    INFECTIOUS DISEASE:  trend markers    HEMATOLOGICAL:   therapeutic LMWH  for now    ENDOCRINE:  Follow up FS.  Insulin protocol if needed.    CODE STATUS: FULL CODE    DISPOSITION: [facility]

## 2021-08-17 NOTE — GOALS OF CARE CONVERSATION - ADVANCED CARE PLANNING - CONVERSATION DETAILS
I had an extensive discussion with the patient at bedside about her medical condition. Patient understands that currently because of her COVID-19 PNA she is requiring maximal settings on BIPAP to maintain an oxygenation status of 93%. Patient understands that the next step if her oxygenation continues to worsen is intubation. I presented the option of intubation to the patient and at this time patient does not want to be intubated. We also discussed the scenario if patient required emergent intubation to prevent cardiopulmonary arrest, patient expressed that she does not want to be intubated during that time. However patient still would like to be full code in the event that she goes into cardiopulmonary arrest. In that event, she states if she would like CPR to be performed and intubated if resuscitated. I explained to the patient that an emergent intubation to prevent cardiopulmonary arrest would result in better outcomes however patient understood this concept and still would only want to be intubated in the event patient goes into cardiopulmonary arrest. Son was present over the phone and stated understanding as well. Patient also requested that I speak with her .

## 2021-08-18 NOTE — PROGRESS NOTE ADULT - ASSESSMENT
·	Acute Hypoxic Respiratory Failure due to COVID19   ·	Severe COVID19 pneumonia   ·	No clear evidence of PE on CT-A; patient unlikely to be able to tolerate a repeat test (BiPAP- dependent); currently on empiric therapeutic Low-Molecular Weight Heparin (Lovenox) taking into consideration the severe covid illness and the DD>4000 on 8/16  ·	Obesity  ·	Type-2 diabetes mellitus on MF as outpatient   ·	HTN on ACE-I/ARB as outpatient     ·	Resolved hyponatremia     PLAN  ·	O2 support   ·	Nutrition team eval re: PPN vs TPN  ·	At this point patient is not able to get off Non-invasive positive pressure ventilation (NIPPV) : severe hypoxemia off mask  ·	Dexamethasone as scheduled   ·	Low-Molecular Weight Heparin (Lovenox) therapeutic dose   ·	diuretics   ·	careful Follow up of lytes and kidney function     At high risk for worsening respiratory status despite current aggressive acre and support, patient is aware.   Case discussed with senior resident assigned.   Discussed with nursing staff at bedside  ·	Acute Hypoxic Respiratory Failure due to COVID19   ·	Severe COVID19 pneumonia   ·	No clear evidence of PE on CT-A; patient unlikely to be able to tolerate a repeat test (BiPAP- dependent); currently on empiric therapeutic Low-Molecular Weight Heparin (Lovenox) taking into consideration the severe covid illness and the DD>4000 on 8/16  ·	Obesity  ·	Type-2 diabetes mellitus on MF as outpatient   ·	HTN on ACE-I/ARB as outpatient     ·	Resolved hyponatremia   ·	Gastric peptic ulcer disease, on Proton Pump Inhibitor twice daily      PLAN  ·	O2 support   ·	Nutrition team eval re: PPN vs TPN  ·	At this point patient is not able to get off Non-invasive positive pressure ventilation (NIPPV) : severe hypoxemia off mask  ·	Dexamethasone as scheduled   ·	Low-Molecular Weight Heparin (Lovenox) therapeutic dose   ·	diuretics   ·	careful Follow up of lytes and kidney function     At high risk for worsening respiratory status despite current aggressive acre and support, patient is aware.   Case discussed with senior resident assigned.   Discussed with nursing staff at bedside

## 2021-08-18 NOTE — PROGRESS NOTE ADULT - ASSESSMENT
66 old female with history of hypothyroid, pre-diabetes, HTN, and asthma presents with shortness of breath starting this morning    # Shortness of breath secondary to severe COVID pneumonia+ superimposed bacterial infection  # suspected PE  - swabbed positive on Monday, shortness of breath started 8/07  - currently on bipap 18/10 24 100% satting 92  - [facility] /UE duplex 8/15 - no evidence of dvt  - CTA 8/14 -> could not r/o PE due to IV infiltration. recommended repeat test.   - Procal 8/16 - .83>.27>.07>.10  - CRP 8/16 -204>42>51>7  - Ferritin 8/15 1426>2247>1574>1359  - D-dimer 8/16 137->227>4492>1265  - legionella and strep ag- negative   - s/p remdesivir 8/12  - s/p toci 800 mg   - s/p abx - rocephin + azithromycin 8/09-8/15  - continue  dexamethasone 6 mg BID   - continue therapeutic lovenox  - continue lasix  - FU procal, crp, ferritin , d-dimer  - f/u ID  - f/u pulm,     # Hypothyroid  - c/w synthroid 137  - tsh - .08     # HTN  - takes losartan 100 / HCTZ 25 at home  - continue losartan 100 qd    # Asthma  - c/w albuterol as needed and singulair    # Hyponatremia- resolved  # Hypomag- resolved  - fu am     # DVT PPX: lovenox 90 bid  # GI PPX: protonix BID  # Diet: DASH  # FULL CODE

## 2021-08-18 NOTE — PROGRESS NOTE ADULT - SUBJECTIVE AND OBJECTIVE BOX
Over Night Events: events noted, on BIPAP 100%    PHYSICAL EXAM    ICU Vital Signs Last 24 Hrs  T(C): 37 (18 Aug 2021 04:00), Max: 37 (18 Aug 2021 00:00)  T(F): 98.6 (18 Aug 2021 04:00), Max: 98.6 (18 Aug 2021 00:00)  HR: 117 (18 Aug 2021 06:00) (101 - 120)  BP: 132/76 (18 Aug 2021 06:00) (112/71 - 158/71)  BP(mean): 98 (18 Aug 2021 06:00) (84 - 105)  RR: 40 (18 Aug 2021 06:00) (20 - 44)  SpO2: 91% (18 Aug 2021 06:00) (90% - 95%)      General: ill looking, tachypneic  HEENT: SOCO             Lymph Nodes: No cervical LN   Lungs: Bilateral crackles  Cardiovascular: Regular   Abdomen: Soft, Positive BS  Extremities: No clubbing   Skin: Warm  Neurological: Non focal       08-16-21 @ 07:01  -  08-17-21 @ 07:00  --------------------------------------------------------  IN:    IV PiggyBack: 100 mL    Oral Fluid: 100 mL  Total IN: 200 mL    OUT:    Voided (mL): 1150 mL  Total OUT: 1150 mL    Total NET: -950 mL      08-17-21 @ 07:01  -  08-18-21 @ 06:32  --------------------------------------------------------  IN:    Oral Fluid: 100 mL  Total IN: 100 mL    OUT:    Voided (mL): 850 mL  Total OUT: 850 mL    Total NET: -750 mL          LABS:                          12.4   12.83 )-----------( 418      ( 18 Aug 2021 05:41 )             39.5                                               08-17    140  |  101  |  19  ----------------------------<  117<H>  3.8   |  25  |  0.6<L>    Ca    8.2<L>      17 Aug 2021 05:34  Mg     2.1     08-17    TPro  5.6<L>  /  Alb  3.5  /  TBili  0.9  /  DBili  x   /  AST  39  /  ALT  37  /  AlkPhos  96  08-17                                                                                           LIVER FUNCTIONS - ( 17 Aug 2021 05:34 )  Alb: 3.5 g/dL / Pro: 5.6 g/dL / ALK PHOS: 96 U/L / ALT: 37 U/L / AST: 39 U/L / GGT: x                                                                                                                                   ABG - ( 17 Aug 2021 04:22 )  pH, Arterial: 7.46  pH, Blood: x     /  pCO2: 40    /  pO2: 58    / HCO3: 28    / Base Excess: 4.0   /  SaO2: 90                  MEDICATIONS  (STANDING):  aspirin  chewable 81 milliGRAM(s) Oral daily  dexAMETHasone  Injectable 6 milliGRAM(s) IV Push every 12 hours  dextrose 40% Gel 15 Gram(s) Oral once  dextrose 5%. 1000 milliLiter(s) (50 mL/Hr) IV Continuous <Continuous>  dextrose 5%. 1000 milliLiter(s) (100 mL/Hr) IV Continuous <Continuous>  dextrose 50% Injectable 25 Gram(s) IV Push once  dextrose 50% Injectable 12.5 Gram(s) IV Push once  dextrose 50% Injectable 25 Gram(s) IV Push once  enoxaparin Injectable 90 milliGRAM(s) SubCutaneous two times a day  furosemide   Injectable 40 milliGRAM(s) IV Push daily  glucagon  Injectable 1 milliGRAM(s) IntraMuscular once  insulin glargine Injectable (LANTUS) 16 Unit(s) SubCutaneous every morning  insulin lispro (ADMELOG) corrective regimen sliding scale   SubCutaneous three times a day before meals  insulin lispro Injectable (ADMELOG) 5 Unit(s) SubCutaneous three times a day before meals  levothyroxine 137 MICROGram(s) Oral daily  losartan 100 milliGRAM(s) Oral daily  melatonin 5 milliGRAM(s) Oral at bedtime  montelukast 10 milliGRAM(s) Oral daily  pantoprazole    Tablet 40 milliGRAM(s) Oral two times a day  senna 2 Tablet(s) Oral at bedtime  silver sulfADIAZINE 1% Cream 1 Application(s) Topical three times a day    MEDICATIONS  (PRN):  acetaminophen   Tablet .. 650 milliGRAM(s) Oral every 6 hours PRN Temp greater or equal to 38C (100.4F), Mild Pain (1 - 3)  ALBUTerol    90 MICROgram(s) HFA Inhaler 2 Puff(s) Inhalation every 6 hours PRN Shortness of Breath and/or Wheezing  guaifenesin/dextromethorphan Oral Liquid 10 milliLiter(s) Oral every 6 hours PRN Cough  polyethylene glycol 3350 17 Gram(s) Oral two times a day PRN Constipation      Xrays:                                                                                     ECHO

## 2021-08-18 NOTE — PROGRESS NOTE ADULT - SUBJECTIVE AND OBJECTIVE BOX
SUBJECTIVE:    Patient is a 66y old Female who presents with a chief complaint of covid pneumonia (15 Aug 2021 12:37)    Currently admitted to medicine with the primary diagnosis of: COVID - 19    Today is hospital day 10d.     Overnight Events:     Patient is still on BIPAP 100% satting 93%. Patient is very anxious. has not had po intake for 2 days now. unable to tolerate off bipap    PAST MEDICAL & SURGICAL HISTORY  Type 2 diabetes mellitus without complication, without long-term current use of insulin    Essential hypertension    Acute gout, unspecified cause, unspecified site    Gastroesophageal reflux disease without esophagitis    Hypothyroidism, unspecified type    H/O abdominal hysterectomy    H/O thyroidectomy    ALLERGIES:  Milk (Unknown)  No Known Drug Allergies  Nuts (Unknown)  Seafood (Unknown)    MEDICATIONS:  MEDICATIONS  (STANDING):  aspirin  chewable 81 milliGRAM(s) Oral daily  dexAMETHasone  Injectable 6 milliGRAM(s) IV Push every 12 hours  dextrose 40% Gel 15 Gram(s) Oral once  dextrose 5%. 1000 milliLiter(s) (50 mL/Hr) IV Continuous <Continuous>  dextrose 5%. 1000 milliLiter(s) (100 mL/Hr) IV Continuous <Continuous>  dextrose 50% Injectable 25 Gram(s) IV Push once  dextrose 50% Injectable 12.5 Gram(s) IV Push once  dextrose 50% Injectable 25 Gram(s) IV Push once  enoxaparin Injectable 90 milliGRAM(s) SubCutaneous two times a day  furosemide   Injectable 40 milliGRAM(s) IV Push daily  glucagon  Injectable 1 milliGRAM(s) IntraMuscular once  insulin glargine Injectable (LANTUS) 16 Unit(s) SubCutaneous every morning  insulin lispro (ADMELOG) corrective regimen sliding scale   SubCutaneous three times a day before meals  insulin lispro Injectable (ADMELOG) 5 Unit(s) SubCutaneous three times a day before meals  levothyroxine 137 MICROGram(s) Oral daily  losartan 100 milliGRAM(s) Oral daily  melatonin 5 milliGRAM(s) Oral at bedtime  montelukast 10 milliGRAM(s) Oral daily  pantoprazole    Tablet 40 milliGRAM(s) Oral two times a day  senna 2 Tablet(s) Oral at bedtime  silver sulfADIAZINE 1% Cream 1 Application(s) Topical three times a day    MEDICATIONS  (PRN):  acetaminophen   Tablet .. 650 milliGRAM(s) Oral every 6 hours PRN Temp greater or equal to 38C (100.4F), Mild Pain (1 - 3)  ALBUTerol    90 MICROgram(s) HFA Inhaler 2 Puff(s) Inhalation every 6 hours PRN Shortness of Breath and/or Wheezing  guaifenesin/dextromethorphan Oral Liquid 10 milliLiter(s) Oral every 6 hours PRN Cough  ondansetron Injectable 4 milliGRAM(s) IV Push every 6 hours PRN Nausea  polyethylene glycol 3350 17 Gram(s) Oral two times a day PRN Constipation      VITALS:     ICU Vital Signs Last 24 Hrs  T(C): 36.7 (18 Aug 2021 08:00), Max: 37 (18 Aug 2021 00:00)  T(F): 98 (18 Aug 2021 08:00), Max: 98.6 (18 Aug 2021 00:00)  HR: 116 (18 Aug 2021 09:00) (101 - 119)  BP: 110/87 (18 Aug 2021 09:00) (110/87 - 158/71)  BP(mean): 96 (18 Aug 2021 09:00) (84 - 105)  RR: 34 (18 Aug 2021 09:00) (20 - 44)  SpO2: 92% (18 Aug 2021 09:00) (90% - 95%)    LABS:                          12.4   12.83 )-----------( 418      ( 18 Aug 2021 05:41 )             39.5 08-18    144  |  103  |  26  ----------------------------<  146  4.1   |  27  |  0.6  Ca    8.6      18 Aug 2021 05:41Mg     2.1     08-18  TPro  5.9  /  Alb  3.4  /  TBili  0.8  /  DBili  x   /  AST  35  /  ALT  32  /  AlkPhos  91  08-18    Procalcitonin, Serum: 0.10 (08-16)  Procalcitonin, Serum: 0.05 (08-15)  Procalcitonin, Serum: 0.07 (08-13)  Procalcitonin, Serum: 0.27 (08-11)  Procalcitonin, Serum: 0.83 (08-07)    C-Reactive Protein, Serum: 7 (08-16)  C-Reactive Protein, Serum: 11 (08-15)  C-Reactive Protein, Serum: 51 (08-13)  C-Reactive Protein, Serum: 42 (08-11)  C-Reactive Protein, Serum: 204 (08-07)    Ferritin, Serum: 1359 (08-15)  Ferritin, Serum: 1574 (08-13)  Ferritin, Serum: 2247 (08-11)  Ferritin, Serum: 1426 (08-07)      D-Dimer Assay, Quantitative: 1265 (08-18)  D-Dimer Assay, Quantitative: 4492 (08-16)  D-Dimer Assay, Quantitative: 2220 (08-15)  D-Dimer Assay, Quantitative: 882 (08-14)  D-Dimer Assay, Quantitative: 227 (08-11)  D-Dimer Assay, Quantitative: 137 (08-07)          RADIOLOGY:    < from: Xray Chest 1 View- PORTABLE-Urgent (Xray Chest 1 View- PORTABLE-Urgent .) (08.16.21 @ 05:33) >  Impression:    No significant change in bilateral opacities, trace pleural effusions.    --- End of Report ---    < end of copied text >      < from: [facility] Duplex Upper Ext Vein Scan, Right (08.15.21 @ 17:55) >  Impression:    No evidence of deep or superficial thrombosis in the right upper extremity.    ICD-10:M79.89    < end of copied text >  < from: [facility] Duplex Lower Ext Vein Scan, Bilat (08.15.21 @ 17:57) >  Impression:    Noevidence of deep venous thrombosis or superficial thrombophlebitis in the bilateral lower extremities.    ICD-10:M79.89    < end of copied text >  < from: CT Angio Chest PE Protocol w/ IV Cont (08.14.21 @ 20:57) >  IMPRESSION:    Nondiagnostic CTA of the pulmonary arteries. Considerrepeat examination or nuclear medicine VQ scan as clinically indicated.    Constellation of pulmonary findings which can be seen with atypical viral pneumonia.    < end of copied text >      PHYSICAL EXAM:    GENERAL: NAD, lying. very anxious  CHEST/LUNG: B/l coarse breath sounds  HEART: tachycardic. no murmurs  ABDOMEN: Bowel sounds present; Soft, Nontender, Nondistended.  EXTREMITIES: no edema  NERVOUS SYSTEM:  Alert & Oriented X3, speech clear. No deficits   MSK: FROM all 4 extremities, full and equal strength

## 2021-08-18 NOTE — CHART NOTE - NSCHARTNOTEFT_GEN_A_CORE
NUTRITION SUPPORT CONSULTATION    HPI:  66 year old female with history of hypothyroid, pre-diabetes, HTN, and asthma presents with shortness of breath starting this morning.  Patient started feeling fatigued with body aches and diarrhea on Monday and was swabbed positive for COVID at that time.  She started feeling short of breath this morning.  In the field she was found to be saturating in the 60s.  In the ED she improved to 89 on NRB, and is currently comfortable while on high-flow 60% at 60L.  She was not vaccinated stating she was worried about her allergies.  In the ED she received dexamethasone and remdesivir.  Chest xray shows opacities suggestive of COVID.  Triage vitals: /61    RR 22  Temp 102.3  SpO2 85% on 6L (08 Aug 2021 03:04)        PAST MEDICAL & SURGICAL HISTORY:  Type 2 diabetes mellitus without complication, without long-term current use of insulin  Essential hypertension  Acute gout, unspecified cause, unspecified site  Gastroesophageal reflux disease without esophagitis  Hypothyroidism, unspecified type  H/O abdominal hysterectomy  H/O thyroidectomy    Allergies  Milk (Unknown)  No Known Drug Allergies  Nuts (Unknown)  Seafood (Unknown)    MEDICATIONS  (STANDING):  aspirin  chewable 81 milliGRAM(s) Oral daily  dexAMETHasone  Injectable 6 milliGRAM(s) IV Push every 12 hours  dextrose 40% Gel 15 Gram(s) Oral once  dextrose 5%. 1000 milliLiter(s) (50 mL/Hr) IV Continuous <Continuous>  dextrose 5%. 1000 milliLiter(s) (100 mL/Hr) IV Continuous <Continuous>  dextrose 5%. 1000 milliLiter(s) (50 mL/Hr) IV Continuous <Continuous>  dextrose 50% Injectable 25 Gram(s) IV Push once  dextrose 50% Injectable 12.5 Gram(s) IV Push once  dextrose 50% Injectable 25 Gram(s) IV Push once  enoxaparin Injectable 90 milliGRAM(s) SubCutaneous two times a day  furosemide   Injectable 40 milliGRAM(s) IV Push daily  glucagon  Injectable 1 milliGRAM(s) IntraMuscular once  insulin glargine Injectable (LANTUS) 16 Unit(s) SubCutaneous every morning  insulin lispro (ADMELOG) corrective regimen sliding scale   SubCutaneous three times a day before meals  insulin lispro Injectable (ADMELOG) 5 Unit(s) SubCutaneous three times a day before meals  levothyroxine 137 MICROGram(s) Oral daily  losartan 100 milliGRAM(s) Oral daily  melatonin 5 milliGRAM(s) Oral at bedtime  montelukast 10 milliGRAM(s) Oral daily  pantoprazole    Tablet 40 milliGRAM(s) Oral two times a day  senna 2 Tablet(s) Oral at bedtime  silver sulfADIAZINE 1% Cream 1 Application(s) Topical three times a day    MEDICATIONS  (PRN):  acetaminophen   Tablet .. 650 milliGRAM(s) Oral every 6 hours PRN Temp greater or equal to 38C (100.4F), Mild Pain (1 - 3)  ALBUTerol    90 MICROgram(s) HFA Inhaler 2 Puff(s) Inhalation every 6 hours PRN Shortness of Breath and/or Wheezing  guaifenesin/dextromethorphan Oral Liquid 10 milliLiter(s) Oral every 6 hours PRN Cough  ondansetron Injectable 4 milliGRAM(s) IV Push every 6 hours PRN Nausea  polyethylene glycol 3350 17 Gram(s) Oral two times a day PRN Constipation    ICU Vital Signs Last 24 Hrs  T(C): 36.8 (18 Aug 2021 12:00), Max: 37 (18 Aug 2021 00:00)  T(F): 98.2 (18 Aug 2021 12:00), Max: 98.6 (18 Aug 2021 00:00)  HR: 102 (18 Aug 2021 12:00) (101 - 119)  BP: 130/65 (18 Aug 2021 12:00) (110/87 - 158/71)  BP(mean): 96 (18 Aug 2021 09:00) (86 - 105)  RR: 29 (18 Aug 2021 12:00) (20 - 44)  SpO2: 95% (18 Aug 2021 12:00) (90% - 95%)    Drug Dosing Weight  Height (cm): 160 (07 Aug 2021 20:53)  Weight (kg): 91.1 (09 Aug 2021 01:00)  BMI (kg/m2): 35.6 (09 Aug 2021 01:00)  BSA (m2): 1.94 (09 Aug 2021 01:00)    EXAM     Abd:    Skin:  Enteral access: none  IV access: LUE midline cath    LABS  08-18    144  |  103  |  26<H>  ----------------------------<  146<H>  4.1   |  27  |  0.6<L>    Ca    8.6      18 Aug 2021 05:41  Mg     2.1     08-18    TPro  5.9<L>  /  Alb  3.4<L>  /  TBili  0.8  /  DBili  x   /  AST  35  /  ALT  32  /  AlkPhos  91  08-18                        12.4   12.83 )-----------( 418      ( 18 Aug 2021 05:41 )             39.5     CAPILLARY BLOOD GLUCOSE  POCT Blood Glucose.: 173 mg/dL (18 Aug 2021 10:18)  POCT Blood Glucose.: 171 mg/dL (18 Aug 2021 07:30)  POCT Blood Glucose.: 151 mg/dL (18 Aug 2021 06:43)  POCT Blood Glucose.: 188 mg/dL (17 Aug 2021 15:34)    Diet, DASH/TLC:   Sodium & Cholesterol Restricted (08-08-21 @ 03:36)      ASSESSMENT        PLAN NUTRITION SUPPORT CONSULTATION    66 year old female with history of hypothyroid, pre-diabetes, HTN, and asthma presents with shortness of breath starting this morning.  Patient started feeling fatigued with body aches and diarrhea on Monday and was swabbed positive for COVID at that time.  She started feeling short of breath this morning.  In the field she was found to be saturating in the 60s.  In the ED she improved to 89 on NRB, and is currently comfortable while on high-flow 60% at 60L.  She was not vaccinated stating she was worried about her allergies.  In the ED she received dexamethasone and remdesivir.  Chest xray shows opacities suggestive of COVID.  Triage vitals: /61    RR 22  Temp 102.3  SpO2 85% on 6L   pt admitted 8/7, d/w team on rounds earlier in the admission. NST consult requested this afternoon 8/18, as resp status has slowly deteriorated and pt now BiPAP dependent for ~ 48h.    PAST MEDICAL & SURGICAL HISTORY:  Type 2 diabetes mellitus without complication, without long-term current use of insulin  Essential hypertension  Acute gout, unspecified cause, unspecified site  Gastroesophageal reflux disease without esophagitis  Hypothyroidism, unspecified type  H/O abdominal hysterectomy  H/O thyroidectomy    Allergies  Milk (Unknown)  No Known Drug Allergies  Nuts (Unknown)  Seafood (Unknown)    MEDICATIONS  (STANDING):  aspirin  chewable 81 milliGRAM(s) Oral daily  dexAMETHasone  Injectable 6 milliGRAM(s) IV Push every 12 hours  enoxaparin Injectable 90 milliGRAM(s) SubCutaneous two times a day  furosemide   Injectable 40 milliGRAM(s) IV Push daily  insulin glargine Injectable (LANTUS) 16 Unit(s) SubCutaneous every morning  insulin lispro (ADMELOG) corrective regimen sliding scale   SubCutaneous three times a day before meals  insulin lispro Injectable (ADMELOG) 5 Unit(s) SubCutaneous three times a day before meals  levothyroxine 137 MICROGram(s) Oral daily  losartan 100 milliGRAM(s) Oral daily  melatonin 5 milliGRAM(s) Oral at bedtime  montelukast 10 milliGRAM(s) Oral daily  pantoprazole    Tablet 40 milliGRAM(s) Oral two times a day  senna 2 Tablet(s) Oral at bedtime  silver sulfADIAZINE 1% Cream 1 Application(s) Topical three times a day    MEDICATIONS  (PRN):  acetaminophen   Tablet .. 650 milliGRAM(s) Oral every 6 hours PRN Temp greater or equal to 38C (100.4F), Mild Pain (1 - 3)  ALBUTerol    90 MICROgram(s) HFA Inhaler 2 Puff(s) Inhalation every 6 hours PRN Shortness of Breath and/or Wheezing  guaifenesin/dextromethorphan Oral Liquid 10 milliLiter(s) Oral every 6 hours PRN Cough  ondansetron Injectable 4 milliGRAM(s) IV Push every 6 hours PRN Nausea  polyethylene glycol 3350 17 Gram(s) Oral two times a day PRN Constipation    ICU Vital Signs Last 24 Hrs  T(C): 36.8 (18 Aug 2021 12:00), Max: 37 (18 Aug 2021 00:00)  T(F): 98.2 (18 Aug 2021 12:00), Max: 98.6 (18 Aug 2021 00:00)  HR: 102 (18 Aug 2021 12:00) (101 - 119)  BP: 130/65 (18 Aug 2021 12:00) (110/87 - 158/71)  BP(mean): 96 (18 Aug 2021 09:00) (86 - 105)  RR: 29 (18 Aug 2021 12:00) (20 - 44)  SpO2: 95% (18 Aug 2021 12:00) (90% - 95%)    Drug Dosing Weight  Height (cm): 160 (07 Aug 2021 20:53)  Weight (kg): 91.1 (09 Aug 2021 01:00)  BMI (kg/m2): 35.6 (09 Aug 2021 01:00)  BSA (m2): 1.94 (09 Aug 2021 01:00)    EXAM  more sluggish today, on BiPAP, anxious  Abd:  obese, soft, NT , ND  Skin: turgor good, no breakdown reported  Enteral access: none  moves all extremities, no C/C, some edema of hands, pt "doughy" - overall poorly muscled for age and obese  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  IV access: LUE midline cath    LABS  08-18    144  |  103  |  26<H>  ----------------------------<  146<H>  4.1   |  27  |  0.6<L>    Ca    8.6      18 Aug 2021 05:41  Mg     2.1     08-18    TPro  5.9<L>  /  Alb  3.4<L>  /  TBili  0.8  /  DBili  x   /  AST  35  /  ALT  32  /  AlkPhos  91  08-18                        12.4   12.83 )-----------( 418      ( 18 Aug 2021 05:41 )             39.5     A1C with Estimated Average Glucose (08.10.21 @ 05:35) Result: 7.0:     POCT Blood Glucose.: 173 mg/dL (18 Aug 2021 10:18)  POCT Blood Glucose.: 171 mg/dL (18 Aug 2021 07:30)  POCT Blood Glucose.: 151 mg/dL (18 Aug 2021 06:43)  POCT Blood Glucose.: 188 mg/dL (17 Aug 2021 15:34)    Diet, DASH/TLC:   Sodium & Cholesterol Restricted (08-08-21 @ 03:36)      ASSESSMENT  - covid 19 pneumonia  - progressively worsening resp failure  - DM poorly controlled particularly PTA  - sarcopenic obesity (+ morbid obesity)    SUGGEST:  - if pt able to come off BiPAP for about 5 minutes, should place naso-enteric feeding tube (109 cm tube) and advance tip to beyond 2nd portion of the duodenum     - can then feed enterally into SB, by pump  - baseline triglyceride level ordered  - will start modified PPN via Midline tonight      - Pepcid included in PN, and IV PPI d/c     - somewhat lower IVFE load, ordered SMOF-lipid, but still concerned with reliance of n6fa in any PPN (which is not the case with TPN)  - if can not place naso-duodenal tube by tomorrow, should place central line for TPN  - d/w RN and d/w medical resident

## 2021-08-18 NOTE — PROGRESS NOTE ADULT - SUBJECTIVE AND OBJECTIVE BOX
T H I S   I S    N O  T   A    F I N A L I Z E D   N O T E      DANIELA, MIRI  66y, Female  Allergy: Milk (Unknown)  No Known Drug Allergies  Nuts (Unknown)  Seafood (Unknown)    Hospital Day: 11d    Patient seen and examined earlier today.     PMH/PSH:  PAST MEDICAL & SURGICAL HISTORY:  Type 2 diabetes mellitus without complication, without long-term current use of insulin    Essential hypertension    Acute gout, unspecified cause, unspecified site    Gastroesophageal reflux disease without esophagitis    Hypothyroidism, unspecified type    H/O abdominal hysterectomy    H/O thyroidectomy        LAST 24-Hr EVENTS:    VITALS:  T(F): 98 (08-18-21 @ 08:00), Max: 98.6 (08-18-21 @ 00:00)  HR: 117 (08-18-21 @ 07:45)  BP: 128/68 (08-18-21 @ 07:00) (120/78 - 158/71)  RR: 35 (08-18-21 @ 07:00)  SpO2: 93% (08-18-21 @ 07:45)          TESTS & MEASUREMENTS:        08-16-21 @ 07:01  -  08-17-21 @ 07:00  --------------------------------------------------------  IN: 200 mL / OUT: 1150 mL / NET: -950 mL    08-17-21 @ 07:01  -  08-18-21 @ 07:00  --------------------------------------------------------  IN: 100 mL / OUT: 850 mL / NET: -750 mL                            12.4   12.83 )-----------( 418      ( 18 Aug 2021 05:41 )             39.5         08-18    144  |  103  |  26<H>  ----------------------------<  146<H>  4.1   |  27  |  0.6<L>    Ca    8.6      18 Aug 2021 05:41  Mg     2.1     08-18    TPro  5.9<L>  /  Alb  3.4<L>  /  TBili  0.8  /  DBili  x   /  AST  35  /  ALT  32  /  AlkPhos  91  08-18    LIVER FUNCTIONS - ( 18 Aug 2021 05:41 )  Alb: 3.4 g/dL / Pro: 5.9 g/dL / ALK PHOS: 91 U/L / ALT: 32 U/L / AST: 35 U/L / GGT: x                   Procalcitonin, Serum: 0.10 ng/mL (08-16-21 @ 04:27)  Procalcitonin, Serum: 0.05 ng/mL (08-15-21 @ 07:43)  Procalcitonin, Serum: 0.07 ng/mL (08-13-21 @ 05:49)  Procalcitonin, Serum: 0.27 ng/mL (08-11-21 @ 06:00)    D-Dimer Assay, Quantitative: 1265 ng/mL DDU (08-18-21 @ 05:41)  D-Dimer Assay, Quantitative: 4492 ng/mL DDU (08-16-21 @ 04:27)  D-Dimer Assay, Quantitative: 2220 ng/mL DDU (08-15-21 @ 07:43)  D-Dimer Assay, Quantitative: 882 ng/mL DDU (08-14-21 @ 12:48)  D-Dimer Assay, Quantitative: 227 ng/mL DDU (08-11-21 @ 06:00)    Ferritin, Serum: 1359 ng/mL (08-15-21 @ 07:43)  Ferritin, Serum: 1574 ng/mL (08-13-21 @ 15:00)  Ferritin, Serum: 2247 ng/mL (08-11-21 @ 11:55)              Indwelling Urethral Catheter:     Connect To:  Straight Drainage/Gravity    Indication:  Urine Output Monitoring in Critically Ill (08-16-21 @ 14:24) (not performed)      RADIOLOGY, ECG, & ADDITIONAL TESTS:    CT Angio Chest PE Protocol w/ IV Cont:   EXAM:  CT ANGIO CHEST PULM ART WAWIC            PROCEDURE DATE:  08/14/2021            INTERPRETATION:  CLINICAL HISTORY / REASON FOR EXAM: Shortness of breath    TECHNIQUE: Multislice helical sections were obtained from the thoracic inlet to the lung bases during rapid administration of 80 mL Omnipaque 350 intravenous contrast using a CTA pulmonary embolism protocol. Thin sections were reconstructed through the pulmonary vasculature. Coronal, sagittal and 3D/MIP reformatted images are also submitted.    COMPARISON CT: None.    OTHER STUDIES USED FOR CORRELATION: Chest radiograph from August 14, 2021      FINDINGS:    PULMONARY EMBOLUS: Poor opacification of the pulmonary arteries.    TUBES/LINES: None.    LUNGS, PLEURA, AIRWAYS: Interlobular septal thickening superimposed upon ground glass opacities. Small bilateral pleural effusions, right greater than left. .    THORACIC NODES: No mediastinal or axillary lymphadenopathy.    MEDIASTINUM/GREAT VESSELS: No pericardial effusion. Heart size unremarkable. No aneurysmal dilation of the thoracic aorta.    VISUALIZED UPPER ABDOMEN: Hepatic steatosis.    BONES/SOFT TISSUES: Degenerative changes of the thoracic spine.      IMPRESSION:    Nondiagnostic CTA of the pulmonary arteries. Considerrepeat examination or nuclear medicine VQ scan as clinically indicated.    Constellation of pulmonary findings which can be seen with atypical viral pneumonia.    --- End of Report ---              JANET ANDERSON MD; Attending Radiologist  This documenthas been electronically signed. Aug 15 2021  8:14AM (08-14-21 @ 20:57)    RECENT DIAGNOSTIC ORDERS:  Xray Chest 1 View- PORTABLE-Routine: AM   Indication: covid  Transport: Portable  Exam Completed (08-17-21 @ 12:57)  Ferritin, Serum: AM Sched. Collection: 18-Aug-2021 04:30 (08-17-21 @ 12:57)  C-Reactive Protein, Serum: AM Sched. Collection: 18-Aug-2021 04:30 (08-17-21 @ 12:57)  Procalcitonin, Serum: AM Sched. Collection: 18-Aug-2021 04:30 (08-17-21 @ 12:57)      MEDICATIONS:  MEDICATIONS  (STANDING):  aspirin  chewable 81 milliGRAM(s) Oral daily  dexAMETHasone  Injectable 6 milliGRAM(s) IV Push every 12 hours  dextrose 40% Gel 15 Gram(s) Oral once  dextrose 5%. 1000 milliLiter(s) (50 mL/Hr) IV Continuous <Continuous>  dextrose 5%. 1000 milliLiter(s) (100 mL/Hr) IV Continuous <Continuous>  dextrose 50% Injectable 25 Gram(s) IV Push once  dextrose 50% Injectable 12.5 Gram(s) IV Push once  dextrose 50% Injectable 25 Gram(s) IV Push once  enoxaparin Injectable 90 milliGRAM(s) SubCutaneous two times a day  furosemide   Injectable 40 milliGRAM(s) IV Push daily  glucagon  Injectable 1 milliGRAM(s) IntraMuscular once  insulin glargine Injectable (LANTUS) 16 Unit(s) SubCutaneous every morning  insulin lispro (ADMELOG) corrective regimen sliding scale   SubCutaneous three times a day before meals  insulin lispro Injectable (ADMELOG) 5 Unit(s) SubCutaneous three times a day before meals  levothyroxine 137 MICROGram(s) Oral daily  losartan 100 milliGRAM(s) Oral daily  melatonin 5 milliGRAM(s) Oral at bedtime  montelukast 10 milliGRAM(s) Oral daily  pantoprazole    Tablet 40 milliGRAM(s) Oral two times a day  senna 2 Tablet(s) Oral at bedtime  silver sulfADIAZINE 1% Cream 1 Application(s) Topical three times a day    MEDICATIONS  (PRN):  acetaminophen   Tablet .. 650 milliGRAM(s) Oral every 6 hours PRN Temp greater or equal to 38C (100.4F), Mild Pain (1 - 3)  ALBUTerol    90 MICROgram(s) HFA Inhaler 2 Puff(s) Inhalation every 6 hours PRN Shortness of Breath and/or Wheezing  guaifenesin/dextromethorphan Oral Liquid 10 milliLiter(s) Oral every 6 hours PRN Cough  ondansetron Injectable 4 milliGRAM(s) IV Push every 6 hours PRN Nausea  polyethylene glycol 3350 17 Gram(s) Oral two times a day PRN Constipation      HOME MEDICATIONS:  Albuterol (Eqv-ProAir HFA) 90 mcg/inh inhalation aerosol (08-08)  aspirin 81 mg oral tablet, chewable (08-08)  losartan-hydrochlorothiazide 100 mg-25 mg oral tablet (08-08)  metFORMIN 500 mg oral tablet, extended release (08-08)  Singulair 10 mg oral tablet (08-08)  Synthroid 137 mcg (0.137 mg) oral tablet (08-08)      PHYSICAL EXAM:  GENERAL:   CHEST/LUNG:   HEART:   ABDOMEN:   EXTREMITIES:               MIRI SUAREZ  66y, Female  Allergy: Milk (Unknown)  No Known Drug Allergies  Nuts (Unknown)  Seafood (Unknown)    Hospital Day: 11d    Patient seen and examined earlier today.     LAST 24-Hr EVENTS:  Reporting that she's a "a bit" better breathing   no events reported     VITALS:  T(F): 98 (08-18-21 @ 08:00), Max: 98.6 (08-18-21 @ 00:00)  HR: 117 (08-18-21 @ 07:45)  BP: 128/68 (08-18-21 @ 07:00) (120/78 - 158/71)  RR: 35 (08-18-21 @ 07:00)  SpO2: 93% (08-18-21 @ 07:45) on Non-invasive positive pressure ventilation (NIPPV)       TESTS & MEASUREMENTS:    08-16-21 @ 07:01  -  08-17-21 @ 07:00  --------------------------------------------------------  IN: 200 mL / OUT: 1150 mL / NET: -950 mL    08-17-21 @ 07:01  -  08-18-21 @ 07:00  --------------------------------------------------------  IN: 100 mL / OUT: 850 mL / NET: -750 mL                            12.4   12.83 )-----------( 418      ( 18 Aug 2021 05:41 )             39.5         08-18    144  |  103  |  26<H>  ----------------------------<  146<H>  4.1   |  27  |  0.6<L>    Ca    8.6      18 Aug 2021 05:41  Mg     2.1     08-18    TPro  5.9<L>  /  Alb  3.4<L>  /  TBili  0.8  /  DBili  x   /  AST  35  /  ALT  32  /  AlkPhos  91  08-18    LIVER FUNCTIONS - ( 18 Aug 2021 05:41 )  Alb: 3.4 g/dL / Pro: 5.9 g/dL / ALK PHOS: 91 U/L / ALT: 32 U/L / AST: 35 U/L / GGT: x               Procalcitonin, Serum: 0.10 ng/mL (08-16-21 @ 04:27)  Procalcitonin, Serum: 0.05 ng/mL (08-15-21 @ 07:43)  Procalcitonin, Serum: 0.07 ng/mL (08-13-21 @ 05:49)  Procalcitonin, Serum: 0.27 ng/mL (08-11-21 @ 06:00)    D-Dimer Assay, Quantitative: 1265 ng/mL DDU (08-18-21 @ 05:41)  D-Dimer Assay, Quantitative: 4492 ng/mL DDU (08-16-21 @ 04:27)  D-Dimer Assay, Quantitative: 2220 ng/mL DDU (08-15-21 @ 07:43)  D-Dimer Assay, Quantitative: 882 ng/mL DDU (08-14-21 @ 12:48)  D-Dimer Assay, Quantitative: 227 ng/mL DDU (08-11-21 @ 06:00)    Ferritin, Serum: 1359 ng/mL (08-15-21 @ 07:43)  Ferritin, Serum: 1574 ng/mL (08-13-21 @ 15:00)  Ferritin, Serum: 2247 ng/mL (08-11-21 @ 11:55)        Indwelling Urethral Catheter:     Connect To:  Straight Drainage/Gravity    Indication:  Urine Output Monitoring in Critically Ill (08-16-21 @ 14:24) (not performed)      RADIOLOGY, ECG, & ADDITIONAL TESTS:    CT Angio Chest PE Protocol w/ IV Cont:   EXAM:  CT ANGIO CHEST PULM ART WAWIC           IMPRESSION:    Nondiagnostic CTA of the pulmonary arteries. Considerrepeat examination or nuclear medicine VQ scan as clinically indicated.    Constellation of pulmonary findings which can be seen with atypical viral pneumonia.    JANET ANDERSON MD; Attending Radiologist  This documenthas been electronically signed. Aug 15 2021  8:14AM (08-14-21 @ 20:57)    < from: [facility] Duplex Lower Ext Vein Scan, Bilat (08.15.21 @ 17:57) >  Noevidence of deep venous thrombosis or superficial thrombophlebitis in the bilateral lower extremities.    < from: [facility] Duplex Upper Ext Vein Scan, Right (08.15.21 @ 17:55) >  No evidence of deep or superficial thrombosis in the right upper extremity.          MEDICATIONS:  MEDICATIONS  (STANDING):  aspirin  chewable 81 milliGRAM(s) Oral daily  dexAMETHasone  Injectable 6 milliGRAM(s) IV Push every 12 hours  dextrose 40% Gel 15 Gram(s) Oral once  dextrose 5%. 1000 milliLiter(s) (50 mL/Hr) IV Continuous <Continuous>  dextrose 5%. 1000 milliLiter(s) (100 mL/Hr) IV Continuous <Continuous>  dextrose 50% Injectable 25 Gram(s) IV Push once  dextrose 50% Injectable 12.5 Gram(s) IV Push once  dextrose 50% Injectable 25 Gram(s) IV Push once  enoxaparin Injectable 90 milliGRAM(s) SubCutaneous two times a day  furosemide   Injectable 40 milliGRAM(s) IV Push daily  glucagon  Injectable 1 milliGRAM(s) IntraMuscular once  insulin glargine Injectable (LANTUS) 16 Unit(s) SubCutaneous every morning  insulin lispro (ADMELOG) corrective regimen sliding scale   SubCutaneous three times a day before meals  insulin lispro Injectable (ADMELOG) 5 Unit(s) SubCutaneous three times a day before meals  levothyroxine 137 MICROGram(s) Oral daily  losartan 100 milliGRAM(s) Oral daily  melatonin 5 milliGRAM(s) Oral at bedtime  montelukast 10 milliGRAM(s) Oral daily  pantoprazole    Tablet 40 milliGRAM(s) Oral two times a day  senna 2 Tablet(s) Oral at bedtime  silver sulfADIAZINE 1% Cream 1 Application(s) Topical three times a day    MEDICATIONS  (PRN):  acetaminophen   Tablet .. 650 milliGRAM(s) Oral every 6 hours PRN Temp greater or equal to 38C (100.4F), Mild Pain (1 - 3)  ALBUTerol    90 MICROgram(s) HFA Inhaler 2 Puff(s) Inhalation every 6 hours PRN Shortness of Breath and/or Wheezing  guaifenesin/dextromethorphan Oral Liquid 10 milliLiter(s) Oral every 6 hours PRN Cough  ondansetron Injectable 4 milliGRAM(s) IV Push every 6 hours PRN Nausea  polyethylene glycol 3350 17 Gram(s) Oral two times a day PRN Constipation      HOME MEDICATIONS:  Albuterol (Eqv-ProAir HFA) 90 mcg/inh inhalation aerosol (08-08)  aspirin 81 mg oral tablet, chewable (08-08)  losartan-hydrochlorothiazide 100 mg-25 mg oral tablet (08-08)  metFORMIN 500 mg oral tablet, extended release (08-08)  Singulair 10 mg oral tablet (08-08)  Synthroid 137 mcg (0.137 mg) oral tablet (08-08)      PHYSICAL EXAM:  GENERAL: in no severe respiratory distress, on Non-invasive positive pressure ventilation (NIPPV)    CHEST/LUNG: good air entry (ant ausc)  HEART: S1S2  ABDOMEN: obese/ soft  EXTREMITIES:  no tenderness/ no edema/ no clubbing

## 2021-08-18 NOTE — PROGRESS NOTE ADULT - ASSESSMENT
IMPRESSION:    Acute hypoxemic resp failure on bipap 100%  Severe covid pneumonia  Probable bacterial superinfection  Hyponatremia-resolved  HO Gastritis (recently diagnosed)  HO diverticulitis  D Dimer improving    PLAN:    CNS: Avoid CNS depressant    HEENT:  Oral care    PULMONARY:  HOB @ 45 degrees, BiPAP/ HHFNC as tolerated.  Wean O2 as tolerated. keep Decadron to 6 q 12h    CARDIOVASCULAR: Avoid volume overload , iv lasix daily    GI: Protonix BID,  Feeding.  Bowel regimen    RENAL:  F/u  lytes.  Correct as needed. accurate I/O, trend CMP    INFECTIOUS DISEASE:  trend markers, if ferritin increasing, toci x1    HEMATOLOGICAL:   therapeutic LMWH  for now    ENDOCRINE:  Follow up FS.  Insulin protocol if needed.    CODE STATUS: FULL CODE    [facility]

## 2021-08-18 NOTE — CHART NOTE - NSCHARTNOTESELECT_GEN_ALL_CORE
Event Note
Chart/Event Note
Event Note
Event Note
Nutrition Support/Nutrition Services
Transfer Note
Upgrade/Event Note

## 2021-08-19 NOTE — PROGRESS NOTE ADULT - ASSESSMENT
IMPRESSION:    Acute hypoxemic resp failure on bipap 100%  Severe covid pneumonia  Hyponatremia-resolved  HO Gastritis (recently diagnosed)  HO diverticulitis  D Dimer / ferritin improving    PLAN:    CNS: Avoid CNS depressant    HEENT:  Oral care    PULMONARY:  HOB @ 45 degrees, BiPAP/ HHFNC as tolerated.  Wean O2 as tolerated. keep Decadron to 6 q 12h    CARDIOVASCULAR: Avoid volume overload , iv lasix daily    GI: Protonix BID,  Feeding.  Bowel regimen    RENAL:  F/u  lytes.  Correct as needed. accurate I/O,     INFECTIOUS DISEASE:  trend markers    HEMATOLOGICAL:   therapeutic LMWH  for now    ENDOCRINE:  Follow up FS.  Insulin protocol if needed.    CODE STATUS: FULL CODE    DISPOSITION: [facility]    poor prognosis

## 2021-08-19 NOTE — PROGRESS NOTE ADULT - SUBJECTIVE AND OBJECTIVE BOX
SUBJECTIVE:    Patient is a 66y old Female who presents with a chief complaint of covid pneumonia     Currently admitted to medicine with the primary diagnosis of: COVID - 19    Today is hospital day 12d.     Overnight Events:     Patient is still on BIPAP 100% satting 99%. Started on PPN overnight.     PAST MEDICAL & SURGICAL HISTORY  Type 2 diabetes mellitus without complication, without long-term current use of insulin    Essential hypertension    Acute gout, unspecified cause, unspecified site    Gastroesophageal reflux disease without esophagitis    Hypothyroidism, unspecified type    H/O abdominal hysterectomy    H/O thyroidectomy    ALLERGIES:  Milk (Unknown)  No Known Drug Allergies  Nuts (Unknown)  Seafood (Unknown)    MEDICATIONS:  MEDICATIONS  (STANDING):  aspirin  chewable 81 milliGRAM(s) Oral daily  dexAMETHasone  Injectable 6 milliGRAM(s) IV Push every 12 hours  dextrose 40% Gel 15 Gram(s) Oral once  dextrose 5%. 1000 milliLiter(s) (50 mL/Hr) IV Continuous <Continuous>  dextrose 5%. 1000 milliLiter(s) (100 mL/Hr) IV Continuous <Continuous>  dextrose 50% Injectable 25 Gram(s) IV Push once  dextrose 50% Injectable 12.5 Gram(s) IV Push once  dextrose 50% Injectable 25 Gram(s) IV Push once  enoxaparin Injectable 90 milliGRAM(s) SubCutaneous two times a day  furosemide   Injectable 40 milliGRAM(s) IV Push daily  glucagon  Injectable 1 milliGRAM(s) IntraMuscular once  insulin glargine Injectable (LANTUS) 16 Unit(s) SubCutaneous every morning  insulin lispro (ADMELOG) corrective regimen sliding scale   SubCutaneous three times a day before meals  insulin lispro Injectable (ADMELOG) 5 Unit(s) SubCutaneous three times a day before meals  levothyroxine 137 MICROGram(s) Oral daily  losartan 100 milliGRAM(s) Oral daily  melatonin 5 milliGRAM(s) Oral at bedtime  montelukast 10 milliGRAM(s) Oral daily  pantoprazole    Tablet 40 milliGRAM(s) Oral two times a day  senna 2 Tablet(s) Oral at bedtime  silver sulfADIAZINE 1% Cream 1 Application(s) Topical three times a day    MEDICATIONS  (PRN):  acetaminophen   Tablet .. 650 milliGRAM(s) Oral every 6 hours PRN Temp greater or equal to 38C (100.4F), Mild Pain (1 - 3)  ALBUTerol    90 MICROgram(s) HFA Inhaler 2 Puff(s) Inhalation every 6 hours PRN Shortness of Breath and/or Wheezing  guaifenesin/dextromethorphan Oral Liquid 10 milliLiter(s) Oral every 6 hours PRN Cough  ondansetron Injectable 4 milliGRAM(s) IV Push every 6 hours PRN Nausea  polyethylene glycol 3350 17 Gram(s) Oral two times a day PRN Constipation      VITALS:     ICU Vital Signs Last 24 Hrs  T(C): 36.7 (18 Aug 2021 08:00), Max: 37 (18 Aug 2021 00:00)  T(F): 98 (18 Aug 2021 08:00), Max: 98.6 (18 Aug 2021 00:00)  HR: 116 (18 Aug 2021 09:00) (101 - 119)  BP: 110/87 (18 Aug 2021 09:00) (110/87 - 158/71)  BP(mean): 96 (18 Aug 2021 09:00) (84 - 105)  RR: 34 (18 Aug 2021 09:00) (20 - 44)  SpO2: 92% (18 Aug 2021 09:00) (90% - 95%)    LABS:                          12.4   12.83 )-----------( 418      ( 18 Aug 2021 05:41 )             39.5 08-18    144  |  103  |  26  ----------------------------<  146  4.1   |  27  |  0.6  Ca    8.6      18 Aug 2021 05:41Mg     2.1     08-18  TPro  5.9  /  Alb  3.4  /  TBili  0.8  /  DBili  x   /  AST  35  /  ALT  32  /  AlkPhos  91  08-18    Procalcitonin, Serum: 0.10 (08-16)  Procalcitonin, Serum: 0.05 (08-15)  Procalcitonin, Serum: 0.07 (08-13)  Procalcitonin, Serum: 0.27 (08-11)  Procalcitonin, Serum: 0.83 (08-07)    C-Reactive Protein, Serum: 7 (08-16)  C-Reactive Protein, Serum: 11 (08-15)  C-Reactive Protein, Serum: 51 (08-13)  C-Reactive Protein, Serum: 42 (08-11)  C-Reactive Protein, Serum: 204 (08-07)    Ferritin, Serum: 1359 (08-15)  Ferritin, Serum: 1574 (08-13)  Ferritin, Serum: 2247 (08-11)  Ferritin, Serum: 1426 (08-07)      D-Dimer Assay, Quantitative: 1265 (08-18)  D-Dimer Assay, Quantitative: 4492 (08-16)  D-Dimer Assay, Quantitative: 2220 (08-15)  D-Dimer Assay, Quantitative: 882 (08-14)  D-Dimer Assay, Quantitative: 227 (08-11)  D-Dimer Assay, Quantitative: 137 (08-07)          RADIOLOGY:    < from: Xray Chest 1 View- PORTABLE-Urgent (Xray Chest 1 View- PORTABLE-Urgent .) (08.16.21 @ 05:33) >  Impression:    No significant change in bilateral opacities, trace pleural effusions.    --- End of Report ---    < end of copied text >      < from: [facility] Duplex Upper Ext Vein Scan, Right (08.15.21 @ 17:55) >  Impression:    No evidence of deep or superficial thrombosis in the right upper extremity.    ICD-10:M79.89    < end of copied text >  < from: [facility] Duplex Lower Ext Vein Scan, Bilat (08.15.21 @ 17:57) >  Impression:    Noevidence of deep venous thrombosis or superficial thrombophlebitis in the bilateral lower extremities.    ICD-10:M79.89    < end of copied text >  < from: CT Angio Chest PE Protocol w/ IV Cont (08.14.21 @ 20:57) >  IMPRESSION:    Nondiagnostic CTA of the pulmonary arteries. Considerrepeat examination or nuclear medicine VQ scan as clinically indicated.    Constellation of pulmonary findings which can be seen with atypical viral pneumonia.    < end of copied text >      PHYSICAL EXAM:    GENERAL: NAD, lying. very anxious  CHEST/LUNG: B/l coarse breath sounds  HEART: tachycardic. no murmurs  ABDOMEN: Bowel sounds present; Soft, Nontender, Nondistended.  EXTREMITIES: no edema  NERVOUS SYSTEM:  Alert & Oriented X3, speech clear. No deficits   MSK: FROM all 4 extremities, full and equal strength             SUBJECTIVE:    Patient is a 66y old Female who presents with a chief complaint of covid pneumonia     Currently admitted to medicine with the primary diagnosis of: COVID - 19    Today is hospital day 12d.     Overnight Events:     Patient is still on BIPAP 100% satting 99%. Started on PPN overnight.     PAST MEDICAL & SURGICAL HISTORY  Type 2 diabetes mellitus without complication, without long-term current use of insulin    Essential hypertension    Acute gout, unspecified cause, unspecified site    Gastroesophageal reflux disease without esophagitis    Hypothyroidism, unspecified type    H/O abdominal hysterectomy    H/O thyroidectomy    ALLERGIES:  Milk (Unknown)  No Known Drug Allergies  Nuts (Unknown)  Seafood (Unknown)    MEDICATIONS:      MEDICATIONS  (STANDING):  aspirin  chewable 81 milliGRAM(s) Oral daily  dexAMETHasone  Injectable 6 milliGRAM(s) IV Push every 12 hours  dextrose 40% Gel 15 Gram(s) Oral once  dextrose 5%. 1000 milliLiter(s) (50 mL/Hr) IV Continuous <Continuous>  dextrose 5%. 1000 milliLiter(s) (100 mL/Hr) IV Continuous <Continuous>  dextrose 50% Injectable 25 Gram(s) IV Push once  dextrose 50% Injectable 12.5 Gram(s) IV Push once  dextrose 50% Injectable 25 Gram(s) IV Push once  enoxaparin Injectable 90 milliGRAM(s) SubCutaneous two times a day  fat emulsion (Fish Oil and Plant Based) 20% Infusion 0.8233 Gm/kG/Day (31.3 mL/Hr) IV Continuous <Continuous>  fat emulsion (Fish Oil and Plant Based) 20% Infusion 0.8233 Gm/kG/Day (31.3 mL/Hr) IV Continuous <Continuous>  furosemide   Injectable 40 milliGRAM(s) IV Push daily  glucagon  Injectable 1 milliGRAM(s) IntraMuscular once  insulin glargine Injectable (LANTUS) 16 Unit(s) SubCutaneous every morning  insulin lispro (ADMELOG) corrective regimen sliding scale   SubCutaneous three times a day before meals  insulin lispro Injectable (ADMELOG) 5 Unit(s) SubCutaneous three times a day before meals  levothyroxine 137 MICROGram(s) Oral daily  losartan 100 milliGRAM(s) Oral daily  melatonin 5 milliGRAM(s) Oral at bedtime  montelukast 10 milliGRAM(s) Oral daily  Parenteral Nutrition - Adult 1 Each (70 mL/Hr) TPN Continuous <Continuous>  Parenteral Nutrition - Adult 1 Each (70 mL/Hr) TPN Continuous <Continuous>  senna 2 Tablet(s) Oral at bedtime  silver sulfADIAZINE 1% Cream 1 Application(s) Topical three times a day    MEDICATIONS  (PRN):  acetaminophen   Tablet .. 650 milliGRAM(s) Oral every 6 hours PRN Temp greater or equal to 38C (100.4F), Mild Pain (1 - 3)  ALBUTerol    90 MICROgram(s) HFA Inhaler 2 Puff(s) Inhalation every 6 hours PRN Shortness of Breath and/or Wheezing  guaifenesin/dextromethorphan Oral Liquid 10 milliLiter(s) Oral every 6 hours PRN Cough  LORazepam   Injectable 0.5 milliGRAM(s) IV Push once PRN Anxiety  ondansetron Injectable 4 milliGRAM(s) IV Push every 6 hours PRN Nausea  polyethylene glycol 3350 17 Gram(s) Oral two times a day PRN Constipation      VITALS:     ICU Vital Signs Last 24 Hrs  T(C): 37.1 (19 Aug 2021 12:00), Max: 37.2 (19 Aug 2021 08:00)  T(F): 98.8 (19 Aug 2021 12:00), Max: 98.9 (19 Aug 2021 08:00)  HR: 106 (19 Aug 2021 12:00) (101 - 118)  BP: 145/72 (19 Aug 2021 12:00) (124/67 - 186/76)  BP(mean): 99 (19 Aug 2021 12:00) (84 - 109)  RR: 28 (19 Aug 2021 12:00) (20 - 39)  SpO2: 94% (19 Aug 2021 12:00) (85% - 98%)      LABS:                          12.2   14.11 )-----------( 422      ( 19 Aug 2021 05:05 )             38.4 08-19    141  |  99  |  30  ----------------------------<  203  4.0   |  28  |  0.6  Ca    8.7      19 Aug 2021 05:05Phos  3.8     08-19    Mg     2.2     08-19    TPro  6.1  /  Alb  3.7  /  TBili  0.9  /  DBili  x   /  AST  31  /  ALT  30  /  AlkPhos  97  08-19    Procalcitonin, Serum: 0.09 (08-18)  Procalcitonin, Serum: 0.10 (08-16)  Procalcitonin, Serum: 0.05 (08-15)  Procalcitonin, Serum: 0.07 (08-13)  Procalcitonin, Serum: 0.27 (08-11)  Procalcitonin, Serum: 0.83 (08-07)    C-Reactive Protein, Serum: 21 (08-18)  C-Reactive Protein, Serum: 7 (08-16)  C-Reactive Protein, Serum: 11 (08-15)  C-Reactive Protein, Serum: 51 (08-13)  C-Reactive Protein, Serum: 42 (08-11)  C-Reactive Protein, Serum: 204 (08-07)    Ferritin, Serum: 926 (08-18)  Ferritin, Serum: 1359 (08-15)  Ferritin, Serum: 1574 (08-13)  Ferritin, Serum: 2247 (08-11)  Ferritin, Serum: 1426 (08-07)      D-Dimer Assay, Quantitative: 1265 (08-18)  D-Dimer Assay, Quantitative: 4492 (08-16)  D-Dimer Assay, Quantitative: 2220 (08-15)  D-Dimer Assay, Quantitative: 882 (08-14)  D-Dimer Assay, Quantitative: 227 (08-11)  D-Dimer Assay, Quantitative: 137 (08-07)                          12.4   12.83 )-----------( 418      ( 18 Aug 2021 05:41 )             39.5 08-18    144  |  103  |  26  ----------------------------<  146  4.1   |  27  |  0.6  Ca    8.6      18 Aug 2021 05:41Mg     2.1     08-18  TPro  5.9  /  Alb  3.4  /  TBili  0.8  /  DBili  x   /  AST  35  /  ALT  32  /  AlkPhos  91  08-18    Procalcitonin, Serum: 0.10 (08-16)  Procalcitonin, Serum: 0.05 (08-15)  Procalcitonin, Serum: 0.07 (08-13)  Procalcitonin, Serum: 0.27 (08-11)  Procalcitonin, Serum: 0.83 (08-07)    C-Reactive Protein, Serum: 7 (08-16)  C-Reactive Protein, Serum: 11 (08-15)  C-Reactive Protein, Serum: 51 (08-13)  C-Reactive Protein, Serum: 42 (08-11)  C-Reactive Protein, Serum: 204 (08-07)    Ferritin, Serum: 1359 (08-15)  Ferritin, Serum: 1574 (08-13)  Ferritin, Serum: 2247 (08-11)  Ferritin, Serum: 1426 (08-07)      D-Dimer Assay, Quantitative: 1265 (08-18)  D-Dimer Assay, Quantitative: 4492 (08-16)  D-Dimer Assay, Quantitative: 2220 (08-15)  D-Dimer Assay, Quantitative: 882 (08-14)  D-Dimer Assay, Quantitative: 227 (08-11)  D-Dimer Assay, Quantitative: 137 (08-07)          RADIOLOGY:    < from: Xray Chest 1 View- PORTABLE-Routine (Xray Chest 1 View- PORTABLE-Routine in AM.) (08.18.21 @ 05:58) >    Impression:    Stable bilateral lung opacities        ---End of Report ---    < end of copied text >      < from: Xray Chest 1 View- PORTABLE-Urgent (Xray Chest 1 View- PORTABLE-Urgent .) (08.16.21 @ 05:33) >  Impression:    No significant change in bilateral opacities, trace pleural effusions.    --- End of Report ---    < end of copied text >      < from: [facility] Duplex Upper Ext Vein Scan, Right (08.15.21 @ 17:55) >  Impression:    No evidence of deep or superficial thrombosis in the right upper extremity.    ICD-10:M79.89    < end of copied text >  < from: [facility] Duplex Lower Ext Vein Scan, Bilat (08.15.21 @ 17:57) >  Impression:    Noevidence of deep venous thrombosis or superficial thrombophlebitis in the bilateral lower extremities.    ICD-10:M79.89    < end of copied text >  < from: CT Angio Chest PE Protocol w/ IV Cont (08.14.21 @ 20:57) >  IMPRESSION:    Nondiagnostic CTA of the pulmonary arteries. Considerrepeat examination or nuclear medicine VQ scan as clinically indicated.    Constellation of pulmonary findings which can be seen with atypical viral pneumonia.    < end of copied text >      PHYSICAL EXAM:    GENERAL: NAD, lying. looks very tired  CHEST/LUNG: B/l coarse breath sounds  HEART: tachycardic. no murmurs  ABDOMEN: Bowel sounds present; Soft, Nontender, Nondistended.  EXTREMITIES: no edema  NERVOUS SYSTEM:  Alert & Oriented X3, speech clear. No deficits   MSK: FROM all 4 extremities, full and equal strength             SUBJECTIVE:    Patient is a 66y old Female who presents with a chief complaint of covid pneumonia     Currently admitted to medicine with the primary diagnosis of: COVID - 19    Today is hospital day 12d.     Overnight Events:     Patient is still on BIPAP 100% satting 99%. Started on PPN overnight.   no cp, abd pain, fever    PAST MEDICAL & SURGICAL HISTORY  Type 2 diabetes mellitus without complication, without long-term current use of insulin    Essential hypertension    Acute gout, unspecified cause, unspecified site    Gastroesophageal reflux disease without esophagitis    Hypothyroidism, unspecified type    H/O abdominal hysterectomy    H/O thyroidectomy    ALLERGIES:  Milk (Unknown)  No Known Drug Allergies  Nuts (Unknown)  Seafood (Unknown)    MEDICATIONS:      MEDICATIONS  (STANDING):  aspirin  chewable 81 milliGRAM(s) Oral daily  dexAMETHasone  Injectable 6 milliGRAM(s) IV Push every 12 hours  dextrose 40% Gel 15 Gram(s) Oral once  dextrose 5%. 1000 milliLiter(s) (50 mL/Hr) IV Continuous <Continuous>  dextrose 5%. 1000 milliLiter(s) (100 mL/Hr) IV Continuous <Continuous>  dextrose 50% Injectable 25 Gram(s) IV Push once  dextrose 50% Injectable 12.5 Gram(s) IV Push once  dextrose 50% Injectable 25 Gram(s) IV Push once  enoxaparin Injectable 90 milliGRAM(s) SubCutaneous two times a day  fat emulsion (Fish Oil and Plant Based) 20% Infusion 0.8233 Gm/kG/Day (31.3 mL/Hr) IV Continuous <Continuous>  fat emulsion (Fish Oil and Plant Based) 20% Infusion 0.8233 Gm/kG/Day (31.3 mL/Hr) IV Continuous <Continuous>  furosemide   Injectable 40 milliGRAM(s) IV Push daily  glucagon  Injectable 1 milliGRAM(s) IntraMuscular once  insulin glargine Injectable (LANTUS) 16 Unit(s) SubCutaneous every morning  insulin lispro (ADMELOG) corrective regimen sliding scale   SubCutaneous three times a day before meals  insulin lispro Injectable (ADMELOG) 5 Unit(s) SubCutaneous three times a day before meals  levothyroxine 137 MICROGram(s) Oral daily  losartan 100 milliGRAM(s) Oral daily  melatonin 5 milliGRAM(s) Oral at bedtime  montelukast 10 milliGRAM(s) Oral daily  Parenteral Nutrition - Adult 1 Each (70 mL/Hr) TPN Continuous <Continuous>  Parenteral Nutrition - Adult 1 Each (70 mL/Hr) TPN Continuous <Continuous>  senna 2 Tablet(s) Oral at bedtime  silver sulfADIAZINE 1% Cream 1 Application(s) Topical three times a day    MEDICATIONS  (PRN):  acetaminophen   Tablet .. 650 milliGRAM(s) Oral every 6 hours PRN Temp greater or equal to 38C (100.4F), Mild Pain (1 - 3)  ALBUTerol    90 MICROgram(s) HFA Inhaler 2 Puff(s) Inhalation every 6 hours PRN Shortness of Breath and/or Wheezing  guaifenesin/dextromethorphan Oral Liquid 10 milliLiter(s) Oral every 6 hours PRN Cough  LORazepam   Injectable 0.5 milliGRAM(s) IV Push once PRN Anxiety  ondansetron Injectable 4 milliGRAM(s) IV Push every 6 hours PRN Nausea  polyethylene glycol 3350 17 Gram(s) Oral two times a day PRN Constipation      VITALS:     ICU Vital Signs Last 24 Hrs  T(C): 37.1 (19 Aug 2021 12:00), Max: 37.2 (19 Aug 2021 08:00)  T(F): 98.8 (19 Aug 2021 12:00), Max: 98.9 (19 Aug 2021 08:00)  HR: 106 (19 Aug 2021 12:00) (101 - 118)  BP: 145/72 (19 Aug 2021 12:00) (124/67 - 186/76)  BP(mean): 99 (19 Aug 2021 12:00) (84 - 109)  RR: 28 (19 Aug 2021 12:00) (20 - 39)  SpO2: 94% (19 Aug 2021 12:00) (85% - 98%)      LABS:                          12.2   14.11 )-----------( 422      ( 19 Aug 2021 05:05 )             38.4 08-19    141  |  99  |  30  ----------------------------<  203  4.0   |  28  |  0.6  Ca    8.7      19 Aug 2021 05:05Phos  3.8     08-19    Mg     2.2     08-19    TPro  6.1  /  Alb  3.7  /  TBili  0.9  /  DBili  x   /  AST  31  /  ALT  30  /  AlkPhos  97  08-19    Procalcitonin, Serum: 0.09 (08-18)  Procalcitonin, Serum: 0.10 (08-16)  Procalcitonin, Serum: 0.05 (08-15)  Procalcitonin, Serum: 0.07 (08-13)  Procalcitonin, Serum: 0.27 (08-11)  Procalcitonin, Serum: 0.83 (08-07)    C-Reactive Protein, Serum: 21 (08-18)  C-Reactive Protein, Serum: 7 (08-16)  C-Reactive Protein, Serum: 11 (08-15)  C-Reactive Protein, Serum: 51 (08-13)  C-Reactive Protein, Serum: 42 (08-11)  C-Reactive Protein, Serum: 204 (08-07)    Ferritin, Serum: 926 (08-18)  Ferritin, Serum: 1359 (08-15)  Ferritin, Serum: 1574 (08-13)  Ferritin, Serum: 2247 (08-11)  Ferritin, Serum: 1426 (08-07)      D-Dimer Assay, Quantitative: 1265 (08-18)  D-Dimer Assay, Quantitative: 4492 (08-16)  D-Dimer Assay, Quantitative: 2220 (08-15)  D-Dimer Assay, Quantitative: 882 (08-14)  D-Dimer Assay, Quantitative: 227 (08-11)  D-Dimer Assay, Quantitative: 137 (08-07)                          12.4   12.83 )-----------( 418      ( 18 Aug 2021 05:41 )             39.5 08-18    144  |  103  |  26  ----------------------------<  146  4.1   |  27  |  0.6  Ca    8.6      18 Aug 2021 05:41Mg     2.1     08-18  TPro  5.9  /  Alb  3.4  /  TBili  0.8  /  DBili  x   /  AST  35  /  ALT  32  /  AlkPhos  91  08-18    Procalcitonin, Serum: 0.10 (08-16)  Procalcitonin, Serum: 0.05 (08-15)  Procalcitonin, Serum: 0.07 (08-13)  Procalcitonin, Serum: 0.27 (08-11)  Procalcitonin, Serum: 0.83 (08-07)    C-Reactive Protein, Serum: 7 (08-16)  C-Reactive Protein, Serum: 11 (08-15)  C-Reactive Protein, Serum: 51 (08-13)  C-Reactive Protein, Serum: 42 (08-11)  C-Reactive Protein, Serum: 204 (08-07)    Ferritin, Serum: 1359 (08-15)  Ferritin, Serum: 1574 (08-13)  Ferritin, Serum: 2247 (08-11)  Ferritin, Serum: 1426 (08-07)      D-Dimer Assay, Quantitative: 1265 (08-18)  D-Dimer Assay, Quantitative: 4492 (08-16)  D-Dimer Assay, Quantitative: 2220 (08-15)  D-Dimer Assay, Quantitative: 882 (08-14)  D-Dimer Assay, Quantitative: 227 (08-11)  D-Dimer Assay, Quantitative: 137 (08-07)          RADIOLOGY:    < from: Xray Chest 1 View- PORTABLE-Routine (Xray Chest 1 View- PORTABLE-Routine in AM.) (08.18.21 @ 05:58) >    Impression:    Stable bilateral lung opacities        ---End of Report ---    < end of copied text >      < from: Xray Chest 1 View- PORTABLE-Urgent (Xray Chest 1 View- PORTABLE-Urgent .) (08.16.21 @ 05:33) >  Impression:    No significant change in bilateral opacities, trace pleural effusions.    --- End of Report ---    < end of copied text >      < from: [facility] Duplex Upper Ext Vein Scan, Right (08.15.21 @ 17:55) >  Impression:    No evidence of deep or superficial thrombosis in the right upper extremity.    ICD-10:M79.89    < end of copied text >  < from: [facility] Duplex Lower Ext Vein Scan, Bilat (08.15.21 @ 17:57) >  Impression:    Noevidence of deep venous thrombosis or superficial thrombophlebitis in the bilateral lower extremities.    ICD-10:M79.89    < end of copied text >  < from: CT Angio Chest PE Protocol w/ IV Cont (08.14.21 @ 20:57) >  IMPRESSION:    Nondiagnostic CTA of the pulmonary arteries. Considerrepeat examination or nuclear medicine VQ scan as clinically indicated.    Constellation of pulmonary findings which can be seen with atypical viral pneumonia.    < end of copied text >      PHYSICAL EXAM:    GENERAL: NAD, lying. looks very tired  CHEST/LUNG: B/l coarse breath sounds  HEART: tachycardic. no murmurs  ABDOMEN: Bowel sounds present; Soft, Nontender, Nondistended.  EXTREMITIES: no edema  NERVOUS SYSTEM:  Alert & Oriented X3, speech clear. No deficits   MSK: FROM all 4 extremities, full and equal strength

## 2021-08-19 NOTE — PROGRESS NOTE ADULT - SUBJECTIVE AND OBJECTIVE BOX
Over Night Events: events noted, on bipap 100%, ppn, afebrile    PHYSICAL EXAM    ICU Vital Signs Last 24 Hrs  T(C): 36.9 (19 Aug 2021 04:00), Max: 36.9 (18 Aug 2021 20:00)  T(F): 98.5 (19 Aug 2021 04:00), Max: 98.5 (18 Aug 2021 20:00)  HR: 107 (19 Aug 2021 05:00) (101 - 118)  BP: 140/84 (19 Aug 2021 05:00) (110/87 - 156/76)  BP(mean): 102 (19 Aug 2021 05:00) (84 - 109)  RR: 26 (19 Aug 2021 05:00) (20 - 39)  SpO2: 95% (19 Aug 2021 05:00) (85% - 98%)      General: ill looking  HEENT: SOCO             Lymph Nodes: No cervical LN   Lungs: Bilateral crackles  Cardiovascular: Regular   Abdomen: Soft, Positive BS  Extremities: No clubbing   Skin: Warm  Neurological: Non focal       08-17-21 @ 07:01  -  08-18-21 @ 07:00  --------------------------------------------------------  IN:    Oral Fluid: 100 mL  Total IN: 100 mL    OUT:    Voided (mL): 850 mL  Total OUT: 850 mL    Total NET: -750 mL      08-18-21 @ 07:01  -  08-19-21 @ 06:28  --------------------------------------------------------  IN:    dextrose 5%: 400 mL    Fat Emulsion (Fish Oil &amp; Plant Based) 20% Infusion: 312.7 mL    PPN (Peripheral Parenteral Nutrition): 700 mL  Total IN: 1412.7 mL    OUT:    Voided (mL): 1000 mL  Total OUT: 1000 mL    Total NET: 412.7 mL          LABS:                          12.2   14.11 )-----------( 422      ( 19 Aug 2021 05:05 )             38.4                                               08-19    141  |  99  |  30<H>  ----------------------------<  203<H>  4.0   |  28  |  0.6<L>    Ca    8.7      19 Aug 2021 05:05  Phos  3.8     08-19  Mg     2.2     08-19    TPro  6.1  /  Alb  3.7  /  TBili  0.9  /  DBili  x   /  AST  31  /  ALT  30  /  AlkPhos  97  08-19                                                                                           LIVER FUNCTIONS - ( 19 Aug 2021 05:05 )  Alb: 3.7 g/dL / Pro: 6.1 g/dL / ALK PHOS: 97 U/L / ALT: 30 U/L / AST: 31 U/L / GGT: x                                                                                                                                       MEDICATIONS  (STANDING):  aspirin  chewable 81 milliGRAM(s) Oral daily  dexAMETHasone  Injectable 6 milliGRAM(s) IV Push every 12 hours  dextrose 40% Gel 15 Gram(s) Oral once  dextrose 5%. 1000 milliLiter(s) (50 mL/Hr) IV Continuous <Continuous>  dextrose 5%. 1000 milliLiter(s) (100 mL/Hr) IV Continuous <Continuous>  dextrose 50% Injectable 25 Gram(s) IV Push once  dextrose 50% Injectable 12.5 Gram(s) IV Push once  dextrose 50% Injectable 25 Gram(s) IV Push once  enoxaparin Injectable 90 milliGRAM(s) SubCutaneous two times a day  fat emulsion (Fish Oil and Plant Based) 20% Infusion 0.8233 Gm/kG/Day (31.3 mL/Hr) IV Continuous <Continuous>  furosemide   Injectable 40 milliGRAM(s) IV Push daily  glucagon  Injectable 1 milliGRAM(s) IntraMuscular once  insulin glargine Injectable (LANTUS) 16 Unit(s) SubCutaneous every morning  insulin lispro (ADMELOG) corrective regimen sliding scale   SubCutaneous three times a day before meals  insulin lispro Injectable (ADMELOG) 5 Unit(s) SubCutaneous three times a day before meals  levothyroxine 137 MICROGram(s) Oral daily  losartan 100 milliGRAM(s) Oral daily  melatonin 5 milliGRAM(s) Oral at bedtime  montelukast 10 milliGRAM(s) Oral daily  Parenteral Nutrition - Adult 1 Each (70 mL/Hr) TPN Continuous <Continuous>  senna 2 Tablet(s) Oral at bedtime  silver sulfADIAZINE 1% Cream 1 Application(s) Topical three times a day    MEDICATIONS  (PRN):  acetaminophen   Tablet .. 650 milliGRAM(s) Oral every 6 hours PRN Temp greater or equal to 38C (100.4F), Mild Pain (1 - 3)  ALBUTerol    90 MICROgram(s) HFA Inhaler 2 Puff(s) Inhalation every 6 hours PRN Shortness of Breath and/or Wheezing  guaifenesin/dextromethorphan Oral Liquid 10 milliLiter(s) Oral every 6 hours PRN Cough  LORazepam   Injectable 0.5 milliGRAM(s) IV Push once PRN Anxiety  ondansetron Injectable 4 milliGRAM(s) IV Push every 6 hours PRN Nausea  polyethylene glycol 3350 17 Gram(s) Oral two times a day PRN Constipation      Xrays: reviewed

## 2021-08-19 NOTE — PROGRESS NOTE ADULT - ASSESSMENT
66 old female with history of hypothyroid, pre-diabetes, HTN, and asthma presents with shortness of breath starting this morning    # Shortness of breath secondary to severe COVID pneumonia+ superimposed bacterial infection  # suspected PE  - swabbed positive on Monday, shortness of breath started 8/07  - currently on bipap 18/10 24 100% satting 92  - [facility] duplex 8/15 - no evidence of dvt  - CTA 8/14 -> could not r/o PE due to IV infiltration. recommended repeat test.   - Procal 8/16 - .83>.27>.07>.10  - CRP 8/16 -204>42>51>7  - Ferritin 8/15 1426>2247>1574>1359  - D-dimer 8/16 137->227>4492>1265  - legionella and strep ag- negative   - s/p remdesivir 8/12  - s/p toci 800 mg   - s/p abx - rocephin + azithromycin 8/09-8/15  - continue  dexamethasone 6 mg BID   - continue therapeutic lovenox  - continue lasix  - FU procal, crp, ferritin , d-dimer  - f/u ID  - f/u pulm,     # Hypothyroid  - c/w synthroid 137  - tsh - .08     # HTN  - takes losartan 100 / HCTZ 25 at home  - continue losartan 100 qd    # Asthma  - c/w albuterol as needed and singulair    # Hyponatremia- resolved  # Hypomag- resolved  - fu am     # DVT PPX: lovenox 90 bid  # GI PPX: protonix BID  # Diet: DASH  # FULL CODE 66 old female with history of hypothyroid, pre-diabetes, HTN, and asthma presents with shortness of breath starting this morning    # Shortness of breath secondary to severe COVID pneumonia+ superimposed bacterial infection  # suspected PE  - swabbed positive on Monday, shortness of breath started 8/07  - currently on bipap 18/10 24 100% satting 92  - [facility] /UE duplex 8/15 - no evidence of dvt  - CTA 8/14 -> could not r/o PE due to IV infiltration. recommended repeat test.   - Procal 8/18 - .83>.27>.07>.10>.09  - CRP 8/18 -204>42>51>7>21  - Ferritin 8/18 1426>2247>1574>1359>926  - D-dimer 8/18 137->227>4492>1265  - legionella and strep ag- negative   - s/p remdesivir 8/12  - s/p toci 800 mg   - s/p abx - rocephin + azithromycin 8/09-8/15  - continue  dexamethasone 6 mg BID   - continue therapeutic lovenox  - continue lasix  - started on PPN   - repeat procal, crp, ferritin , d-dimer  - f/u ID  - f/u pulm    # Hypothyroid  - c/w synthroid 137  - tsh - .08     # HTN  - takes losartan 100 / HCTZ 25 at home  - continue losartan 100 qd    # Asthma  - c/w albuterol as needed and singulair    # Hyponatremia- resolved  # Hypomag- resolved  - fu am     # DVT PPX: lovenox 90 bid  # GI PPX: pepcid BID  # Diet: PPN   # FULL CODE

## 2021-08-20 NOTE — PROGRESS NOTE ADULT - SUBJECTIVE AND OBJECTIVE BOX
SUBJECTIVE:    Patient is a 66y old Female who presents with a chief complaint of covid pneumonia     Currently admitted to medicine with the primary diagnosis of: COVID - 19    Today is hospital day 13d.     Overnight Events:     Patient is still on BIPAP 100% satting 99%. tolerated 3 hours of HFNC in the am. Patient hypertensive overnight     no cp, abd pain, fever    PAST MEDICAL & SURGICAL HISTORY  Type 2 diabetes mellitus without complication, without long-term current use of insulin    Essential hypertension    Acute gout, unspecified cause, unspecified site    Gastroesophageal reflux disease without esophagitis    Hypothyroidism, unspecified type    H/O abdominal hysterectomy    H/O thyroidectomy    ALLERGIES:  Milk (Unknown)  No Known Drug Allergies  Nuts (Unknown)  Seafood (Unknown)    MEDICATIONS:  MEDICATIONS  (STANDING):  aspirin  chewable 81 milliGRAM(s) Oral daily  dexAMETHasone  Injectable 6 milliGRAM(s) IV Push every 12 hours  enoxaparin Injectable 90 milliGRAM(s) SubCutaneous two times a day  fat emulsion (Fish Oil and Plant Based) 20% Infusion 0.8233 Gm/kG/Day (31.3 mL/Hr) IV Continuous <Continuous>  fat emulsion (Fish Oil and Plant Based) 20% Infusion 0.8233 Gm/kG/Day (31.3 mL/Hr) IV Continuous <Continuous>  furosemide   Injectable 40 milliGRAM(s) IV Push daily  glucagon  Injectable 1 milliGRAM(s) IntraMuscular once  insulin glargine Injectable (LANTUS) 20 Unit(s) SubCutaneous every morning  insulin lispro (ADMELOG) corrective regimen sliding scale   SubCutaneous three times a day before meals  insulin lispro Injectable (ADMELOG) 5 Unit(s) SubCutaneous three times a day before meals  levothyroxine 137 MICROGram(s) Oral daily  losartan 100 milliGRAM(s) Oral daily  melatonin 5 milliGRAM(s) Oral at bedtime  montelukast 10 milliGRAM(s) Oral daily  Parenteral Nutrition - Adult 1 Each (70 mL/Hr) TPN Continuous <Continuous>  Parenteral Nutrition - Adult 1 Each (70 mL/Hr) TPN Continuous <Continuous>  senna 2 Tablet(s) Oral at bedtime  silver sulfADIAZINE 1% Cream 1 Application(s) Topical three times a day    MEDICATIONS  (PRN):  acetaminophen   Tablet .. 650 milliGRAM(s) Oral every 6 hours PRN Temp greater or equal to 38C (100.4F), Mild Pain (1 - 3)  ALBUTerol    90 MICROgram(s) HFA Inhaler 2 Puff(s) Inhalation every 6 hours PRN Shortness of Breath and/or Wheezing  guaifenesin/dextromethorphan Oral Liquid 10 milliLiter(s) Oral every 6 hours PRN Cough  ondansetron Injectable 4 milliGRAM(s) IV Push every 6 hours PRN Nausea  polyethylene glycol 3350 17 Gram(s) Oral two times a day PRN Constipation    VITALS:     ICU Vital Signs Last 24 Hrs  T(C): 36.9 (20 Aug 2021 11:00), Max: 37.2 (19 Aug 2021 20:00)  T(F): 98.5 (20 Aug 2021 11:00), Max: 99 (19 Aug 2021 20:00)  HR: 85 (20 Aug 2021 11:00) (75 - 99)  BP: 142/87 (20 Aug 2021 11:00) (127/65 - 193/87)  BP(mean): 98 (20 Aug 2021 11:00) (89 - 125)  RR: 31 (20 Aug 2021 11:00) (19 - 45)  SpO2: 83% (20 Aug 2021 11:00) (82% - 100%)        LABS:                          11.7   12.87 )-----------( 355      ( 20 Aug 2021 04:38 )             37.3 08-20    139  |  97  |  29  ----------------------------<  223  4.1   |  29  |  0.6  Ca    8.6      20 Aug 2021 04:38Phos  4.1     08-20Mg     2.3     08-20  TPro  5.8  /  Alb  3.5  /  TBili  0.9  /  DBili  x   /  AST  30  /  ALT  26  /  AlkPhos  74  08-20    Procalcitonin, Serum: 0.09 (08-18)  Procalcitonin, Serum: 0.10 (08-16)  Procalcitonin, Serum: 0.05 (08-15)  Procalcitonin, Serum: 0.07 (08-13)  Procalcitonin, Serum: 0.27 (08-11)  Procalcitonin, Serum: 0.83 (08-07)    C-Reactive Protein, Serum: 21 (08-18)  C-Reactive Protein, Serum: 7 (08-16)  C-Reactive Protein, Serum: 11 (08-15)  C-Reactive Protein, Serum: 51 (08-13)  C-Reactive Protein, Serum: 42 (08-11)  C-Reactive Protein, Serum: 204 (08-07)    Ferritin, Serum: 926 (08-18)  Ferritin, Serum: 1359 (08-15)  Ferritin, Serum: 1574 (08-13)  Ferritin, Serum: 2247 (08-11)  Ferritin, Serum: 1426 (08-07)      D-Dimer Assay, Quantitative: 651 (08-20)  D-Dimer Assay, Quantitative: 1265 (08-18)  D-Dimer Assay, Quantitative: 4492 (08-16)  D-Dimer Assay, Quantitative: 2220 (08-15)  D-Dimer Assay, Quantitative: 882 (08-14)  D-Dimer Assay, Quantitative: 227 (08-11)  D-Dimer Assay, Quantitative: 137 (08-07)                          12.2   14.11 )-----------( 422      ( 19 Aug 2021 05:05 )             38.4 08-19    141  |  99  |  30  ----------------------------<  203  4.0   |  28  |  0.6  Ca    8.7      19 Aug 2021 05:05  Phos  3.8     08-19    Mg     2.2     08-19    TPro  6.1  /  Alb  3.7  /  TBili  0.9  /  DBili  x   /  AST  31  /  ALT  30  /  AlkPhos  97  08-19    Procalcitonin, Serum: 0.09 (08-18)  Procalcitonin, Serum: 0.10 (08-16)  Procalcitonin, Serum: 0.05 (08-15)  Procalcitonin, Serum: 0.07 (08-13)  Procalcitonin, Serum: 0.27 (08-11)  Procalcitonin, Serum: 0.83 (08-07)    C-Reactive Protein, Serum: 21 (08-18)  C-Reactive Protein, Serum: 7 (08-16)  C-Reactive Protein, Serum: 11 (08-15)  C-Reactive Protein, Serum: 51 (08-13)  C-Reactive Protein, Serum: 42 (08-11)  C-Reactive Protein, Serum: 204 (08-07)    Ferritin, Serum: 926 (08-18)  Ferritin, Serum: 1359 (08-15)  Ferritin, Serum: 1574 (08-13)  Ferritin, Serum: 2247 (08-11)  Ferritin, Serum: 1426 (08-07)      D-Dimer Assay, Quantitative: 1265 (08-18)  D-Dimer Assay, Quantitative: 4492 (08-16)  D-Dimer Assay, Quantitative: 2220 (08-15)  D-Dimer Assay, Quantitative: 882 (08-14)  D-Dimer Assay, Quantitative: 227 (08-11)  D-Dimer Assay, Quantitative: 137 (08-07)                          12.4   12.83 )-----------( 418      ( 18 Aug 2021 05:41 )             39.5 08-18    144  |  103  |  26  ----------------------------<  146  4.1   |  27  |  0.6  Ca    8.6      18 Aug 2021 05:41Mg     2.1     08-18  TPro  5.9  /  Alb  3.4  /  TBili  0.8  /  DBili  x   /  AST  35  /  ALT  32  /  AlkPhos  91  08-18    Procalcitonin, Serum: 0.10 (08-16)  Procalcitonin, Serum: 0.05 (08-15)  Procalcitonin, Serum: 0.07 (08-13)  Procalcitonin, Serum: 0.27 (08-11)  Procalcitonin, Serum: 0.83 (08-07)    C-Reactive Protein, Serum: 7 (08-16)  C-Reactive Protein, Serum: 11 (08-15)  C-Reactive Protein, Serum: 51 (08-13)  C-Reactive Protein, Serum: 42 (08-11)  C-Reactive Protein, Serum: 204 (08-07)    Ferritin, Serum: 1359 (08-15)  Ferritin, Serum: 1574 (08-13)  Ferritin, Serum: 2247 (08-11)  Ferritin, Serum: 1426 (08-07)      D-Dimer Assay, Quantitative: 1265 (08-18)  D-Dimer Assay, Quantitative: 4492 (08-16)  D-Dimer Assay, Quantitative: 2220 (08-15)  D-Dimer Assay, Quantitative: 882 (08-14)  D-Dimer Assay, Quantitative: 227 (08-11)  D-Dimer Assay, Quantitative: 137 (08-07)          RADIOLOGY:    < from: Xray Chest 1 View- PORTABLE-Routine (Xray Chest 1 View- PORTABLE-Routine in AM.) (08.18.21 @ 05:58) >    Impression:    Stable bilateral lung opacities        ---End of Report ---    < end of copied text >      < from: Xray Chest 1 View- PORTABLE-Urgent (Xray Chest 1 View- PORTABLE-Urgent .) (08.16.21 @ 05:33) >  Impression:    No significant change in bilateral opacities, trace pleural effusions.    --- End of Report ---    < end of copied text >      < from: [facility] Duplex Upper Ext Vein Scan, Right (08.15.21 @ 17:55) >  Impression:    No evidence of deep or superficial thrombosis in the right upper extremity.    ICD-10:M79.89    < end of copied text >  < from: [facility] Duplex Lower Ext Vein Scan, Bilat (08.15.21 @ 17:57) >  Impression:    Noevidence of deep venous thrombosis or superficial thrombophlebitis in the bilateral lower extremities.    ICD-10:M79.89    < end of copied text >  < from: CT Angio Chest PE Protocol w/ IV Cont (08.14.21 @ 20:57) >  IMPRESSION:    Nondiagnostic CTA of the pulmonary arteries. Considerrepeat examination or nuclear medicine VQ scan as clinically indicated.    Constellation of pulmonary findings which can be seen with atypical viral pneumonia.    < end of copied text >      PHYSICAL EXAM:    GENERAL: NAD, lying. looks very tired  CHEST/LUNG: B/l coarse breath sounds  HEART: tachycardic. no murmurs  ABDOMEN: Bowel sounds present; Soft, Nontender, Nondistended.  EXTREMITIES: no edema  NERVOUS SYSTEM:  Alert & Oriented X3, speech clear. No deficits   MSK: FROM all 4 extremities, full and equal strength             SUBJECTIVE:    Patient is a 66y old Female who presents with a chief complaint of covid pneumonia     Currently admitted to medicine with the primary diagnosis of: COVID - 19    Today is hospital day 13d.     Overnight Events:     Patient is still on BIPAP 100% satting 99%. tolerated 3 hours of HFNC in the am. Patient hypertensive overnight     no cp, abd pain, fever  sob unchanged, no cough, orthopnea, pnd    PAST MEDICAL & SURGICAL HISTORY  Type 2 diabetes mellitus without complication, without long-term current use of insulin    Essential hypertension    Acute gout, unspecified cause, unspecified site    Gastroesophageal reflux disease without esophagitis    Hypothyroidism, unspecified type    H/O abdominal hysterectomy    H/O thyroidectomy    ALLERGIES:  Milk (Unknown)  No Known Drug Allergies  Nuts (Unknown)  Seafood (Unknown)    MEDICATIONS:  MEDICATIONS  (STANDING):  aspirin  chewable 81 milliGRAM(s) Oral daily  dexAMETHasone  Injectable 6 milliGRAM(s) IV Push every 12 hours  enoxaparin Injectable 90 milliGRAM(s) SubCutaneous two times a day  fat emulsion (Fish Oil and Plant Based) 20% Infusion 0.8233 Gm/kG/Day (31.3 mL/Hr) IV Continuous <Continuous>  fat emulsion (Fish Oil and Plant Based) 20% Infusion 0.8233 Gm/kG/Day (31.3 mL/Hr) IV Continuous <Continuous>  furosemide   Injectable 40 milliGRAM(s) IV Push daily  glucagon  Injectable 1 milliGRAM(s) IntraMuscular once  insulin glargine Injectable (LANTUS) 20 Unit(s) SubCutaneous every morning  insulin lispro (ADMELOG) corrective regimen sliding scale   SubCutaneous three times a day before meals  insulin lispro Injectable (ADMELOG) 5 Unit(s) SubCutaneous three times a day before meals  levothyroxine 137 MICROGram(s) Oral daily  losartan 100 milliGRAM(s) Oral daily  melatonin 5 milliGRAM(s) Oral at bedtime  montelukast 10 milliGRAM(s) Oral daily  Parenteral Nutrition - Adult 1 Each (70 mL/Hr) TPN Continuous <Continuous>  Parenteral Nutrition - Adult 1 Each (70 mL/Hr) TPN Continuous <Continuous>  senna 2 Tablet(s) Oral at bedtime  silver sulfADIAZINE 1% Cream 1 Application(s) Topical three times a day    MEDICATIONS  (PRN):  acetaminophen   Tablet .. 650 milliGRAM(s) Oral every 6 hours PRN Temp greater or equal to 38C (100.4F), Mild Pain (1 - 3)  ALBUTerol    90 MICROgram(s) HFA Inhaler 2 Puff(s) Inhalation every 6 hours PRN Shortness of Breath and/or Wheezing  guaifenesin/dextromethorphan Oral Liquid 10 milliLiter(s) Oral every 6 hours PRN Cough  ondansetron Injectable 4 milliGRAM(s) IV Push every 6 hours PRN Nausea  polyethylene glycol 3350 17 Gram(s) Oral two times a day PRN Constipation    VITALS:     ICU Vital Signs Last 24 Hrs  T(C): 36.9 (20 Aug 2021 11:00), Max: 37.2 (19 Aug 2021 20:00)  T(F): 98.5 (20 Aug 2021 11:00), Max: 99 (19 Aug 2021 20:00)  HR: 85 (20 Aug 2021 11:00) (75 - 99)  BP: 142/87 (20 Aug 2021 11:00) (127/65 - 193/87)  BP(mean): 98 (20 Aug 2021 11:00) (89 - 125)  RR: 31 (20 Aug 2021 11:00) (19 - 45)  SpO2: 83% (20 Aug 2021 11:00) (82% - 100%)        LABS:                          11.7   12.87 )-----------( 355      ( 20 Aug 2021 04:38 )             37.3 08-20    139  |  97  |  29  ----------------------------<  223  4.1   |  29  |  0.6  Ca    8.6      20 Aug 2021 04:38Phos  4.1     08-20Mg     2.3     08-20  TPro  5.8  /  Alb  3.5  /  TBili  0.9  /  DBili  x   /  AST  30  /  ALT  26  /  AlkPhos  74  08-20    Procalcitonin, Serum: 0.09 (08-18)  Procalcitonin, Serum: 0.10 (08-16)  Procalcitonin, Serum: 0.05 (08-15)  Procalcitonin, Serum: 0.07 (08-13)  Procalcitonin, Serum: 0.27 (08-11)  Procalcitonin, Serum: 0.83 (08-07)    C-Reactive Protein, Serum: 21 (08-18)  C-Reactive Protein, Serum: 7 (08-16)  C-Reactive Protein, Serum: 11 (08-15)  C-Reactive Protein, Serum: 51 (08-13)  C-Reactive Protein, Serum: 42 (08-11)  C-Reactive Protein, Serum: 204 (08-07)    Ferritin, Serum: 926 (08-18)  Ferritin, Serum: 1359 (08-15)  Ferritin, Serum: 1574 (08-13)  Ferritin, Serum: 2247 (08-11)  Ferritin, Serum: 1426 (08-07)      D-Dimer Assay, Quantitative: 651 (08-20)  D-Dimer Assay, Quantitative: 1265 (08-18)  D-Dimer Assay, Quantitative: 4492 (08-16)  D-Dimer Assay, Quantitative: 2220 (08-15)  D-Dimer Assay, Quantitative: 882 (08-14)  D-Dimer Assay, Quantitative: 227 (08-11)  D-Dimer Assay, Quantitative: 137 (08-07)                          12.2   14.11 )-----------( 422      ( 19 Aug 2021 05:05 )             38.4 08-19    141  |  99  |  30  ----------------------------<  203  4.0   |  28  |  0.6  Ca    8.7      19 Aug 2021 05:05  Phos  3.8     08-19    Mg     2.2     08-19    TPro  6.1  /  Alb  3.7  /  TBili  0.9  /  DBili  x   /  AST  31  /  ALT  30  /  AlkPhos  97  08-19    Procalcitonin, Serum: 0.09 (08-18)  Procalcitonin, Serum: 0.10 (08-16)  Procalcitonin, Serum: 0.05 (08-15)  Procalcitonin, Serum: 0.07 (08-13)  Procalcitonin, Serum: 0.27 (08-11)  Procalcitonin, Serum: 0.83 (08-07)    C-Reactive Protein, Serum: 21 (08-18)  C-Reactive Protein, Serum: 7 (08-16)  C-Reactive Protein, Serum: 11 (08-15)  C-Reactive Protein, Serum: 51 (08-13)  C-Reactive Protein, Serum: 42 (08-11)  C-Reactive Protein, Serum: 204 (08-07)    Ferritin, Serum: 926 (08-18)  Ferritin, Serum: 1359 (08-15)  Ferritin, Serum: 1574 (08-13)  Ferritin, Serum: 2247 (08-11)  Ferritin, Serum: 1426 (08-07)      D-Dimer Assay, Quantitative: 1265 (08-18)  D-Dimer Assay, Quantitative: 4492 (08-16)  D-Dimer Assay, Quantitative: 2220 (08-15)  D-Dimer Assay, Quantitative: 882 (08-14)  D-Dimer Assay, Quantitative: 227 (08-11)  D-Dimer Assay, Quantitative: 137 (08-07)                          12.4   12.83 )-----------( 418      ( 18 Aug 2021 05:41 )             39.5 08-18    144  |  103  |  26  ----------------------------<  146  4.1   |  27  |  0.6  Ca    8.6      18 Aug 2021 05:41Mg     2.1     08-18  TPro  5.9  /  Alb  3.4  /  TBili  0.8  /  DBili  x   /  AST  35  /  ALT  32  /  AlkPhos  91  08-18    Procalcitonin, Serum: 0.10 (08-16)  Procalcitonin, Serum: 0.05 (08-15)  Procalcitonin, Serum: 0.07 (08-13)  Procalcitonin, Serum: 0.27 (08-11)  Procalcitonin, Serum: 0.83 (08-07)    C-Reactive Protein, Serum: 7 (08-16)  C-Reactive Protein, Serum: 11 (08-15)  C-Reactive Protein, Serum: 51 (08-13)  C-Reactive Protein, Serum: 42 (08-11)  C-Reactive Protein, Serum: 204 (08-07)    Ferritin, Serum: 1359 (08-15)  Ferritin, Serum: 1574 (08-13)  Ferritin, Serum: 2247 (08-11)  Ferritin, Serum: 1426 (08-07)      D-Dimer Assay, Quantitative: 1265 (08-18)  D-Dimer Assay, Quantitative: 4492 (08-16)  D-Dimer Assay, Quantitative: 2220 (08-15)  D-Dimer Assay, Quantitative: 882 (08-14)  D-Dimer Assay, Quantitative: 227 (08-11)  D-Dimer Assay, Quantitative: 137 (08-07)          RADIOLOGY:    < from: Xray Chest 1 View- PORTABLE-Routine (Xray Chest 1 View- PORTABLE-Routine in AM.) (08.18.21 @ 05:58) >    Impression:    Stable bilateral lung opacities        ---End of Report ---    < end of copied text >      < from: Xray Chest 1 View- PORTABLE-Urgent (Xray Chest 1 View- PORTABLE-Urgent .) (08.16.21 @ 05:33) >  Impression:    No significant change in bilateral opacities, trace pleural effusions.    --- End of Report ---    < end of copied text >      < from: [facility] Duplex Upper Ext Vein Scan, Right (08.15.21 @ 17:55) >  Impression:    No evidence of deep or superficial thrombosis in the right upper extremity.    ICD-10:M79.89    < end of copied text >  < from: [facility] Duplex Lower Ext Vein Scan, Bilat (08.15.21 @ 17:57) >  Impression:    Noevidence of deep venous thrombosis or superficial thrombophlebitis in the bilateral lower extremities.    ICD-10:M79.89    < end of copied text >  < from: CT Angio Chest PE Protocol w/ IV Cont (08.14.21 @ 20:57) >  IMPRESSION:    Nondiagnostic CTA of the pulmonary arteries. Considerrepeat examination or nuclear medicine VQ scan as clinically indicated.    Constellation of pulmonary findings which can be seen with atypical viral pneumonia.    < end of copied text >      PHYSICAL EXAM:    GENERAL: NAD, lying. looks very tired  CHEST/LUNG: B/l coarse breath sounds  HEART: tachycardic. no murmurs  ABDOMEN: Bowel sounds present; Soft, Nontender, Nondistended.  EXTREMITIES: no edema  NERVOUS SYSTEM:  Alert & Oriented X3, speech clear. No deficits   MSK: FROM all 4 extremities, full and equal strength

## 2021-08-20 NOTE — PROGRESS NOTE ADULT - SUBJECTIVE AND OBJECTIVE BOX
pt on high flow for about an hour (so far) when seen this morning, but has been requiring BiPAP continuously for several days  sluggish but rousable  abd obese, soft, NT  bilat Midline catheters in place  + hypertensive overnight  note that po diet is ordered but pt is NPO due to BiPAP dependence  Vital Signs Last 24 Hrs  T(C): 36.9 (20 Aug 2021 11:00), Max: 37.2 (19 Aug 2021 20:00)  T(F): 98.5 (20 Aug 2021 11:00), Max: 99 (19 Aug 2021 20:00)  HR: 85 (20 Aug 2021 11:00) (75 - 99)  BP: 142/87 (20 Aug 2021 11:00) (137/65 - 193/87)  BP(mean): 98 (20 Aug 2021 11:00) (90 - 125)  RR: 31 (20 Aug 2021 11:00) (19 - 45)  SpO2: 83% (20 Aug 2021 11:00) (82% - 100%)    MEDICATIONS  (STANDING):  aspirin  chewable 81 milliGRAM(s) Oral daily  dexAMETHasone  Injectable 6 milliGRAM(s) IV Push every 12 hours  enoxaparin Injectable 90 milliGRAM(s) SubCutaneous two times a day  fat emulsion (Fish Oil and Plant Based) 20% Infusion 0.8233 Gm/kG/Day (31.3 mL/Hr) IV Continuous <Continuous>  furosemide   Injectable 40 milliGRAM(s) IV Push daily  insulin glargine Injectable (LANTUS) 23 Unit(s) SubCutaneous at bedtime  insulin lispro (ADMELOG) corrective regimen sliding scale   SubCutaneous three times a day before meals  insulin lispro Injectable (ADMELOG) 6 Unit(s) SubCutaneous three times a day before meals  levothyroxine 137 MICROGram(s) Oral daily  losartan 100 milliGRAM(s) Oral daily  melatonin 5 milliGRAM(s) Oral at bedtime  montelukast 10 milliGRAM(s) Oral daily  Parenteral Nutrition - Adult 1 Each (70 mL/Hr) TPN Continuous <Continuous>  Parenteral Nutrition - Adult 1 Each (70 mL/Hr) TPN Continuous <Continuous>  senna 2 Tablet(s) Oral at bedtime  silver sulfADIAZINE 1% Cream 1 Application(s) Topical three times a day                        11.7   12.87 )-----------( 355      ( 20 Aug 2021 04:38 )             37.3   08-20    139  |  97<L>  |  29<H>  ----------------------------<  223<H>  4.1   |  29  |  0.6<L>    Ca    8.6      20 Aug 2021 04:38  Phos  4.1     08-20  Mg     2.3     08-20    TPro  5.8<L>  /  Alb  3.5  /  TBili  0.9  /  DBili  x   /  AST  30  /  ALT  26  /  AlkPhos  74  08-20    POCT Blood Glucose.: 270 mg/dL (20 Aug 2021 10:50)  POCT Blood Glucose.: 232 mg/dL (20 Aug 2021 07:37)  POCT Blood Glucose.: 224 mg/dL (20 Aug 2021 05:50)  POCT Blood Glucose.: 240 mg/dL (19 Aug 2021 17:43)    < from: Xray Chest 1 View- PORTABLE-Routine (Xray Chest 1 View- PORTABLE-Routine in AM.) (08.18.21 @ 05:58) >    Stable bilateral lung opacities    < end of copied text >

## 2021-08-20 NOTE — PROGRESS NOTE ADULT - ASSESSMENT
IMPRESSION:    Acute hypoxemic resp failure on bipap 100%  Severe covid pneumonia  Hyponatremia-resolved  D Dimer / ferritin improving    PLAN:    CNS: Avoid CNS depressant    HEENT:  Oral care    PULMONARY:  HOB @ 45 degrees, BiPAP/ HHFNC as tolerated.  Wean O2 as tolerated. keep Decadron to 6 q 12h    CARDIOVASCULAR: Avoid volume overload , iv lasix daily    GI: Protonix BID,  Feeding.  Bowel regimen    RENAL:  F/u  lytes.  Correct as needed. accurate I/O,     INFECTIOUS DISEASE:  trend markers, abx per ID    HEMATOLOGICAL:   therapeutic LMWH  for now    ENDOCRINE:  Follow up FS.  Insulin protocol if needed.    CODE STATUS: FULL CODE    DISPOSITION: MICU    poor prognosis

## 2021-08-20 NOTE — PROGRESS NOTE ADULT - ASSESSMENT
ASSESSMENT  - covid 19 pneumonia  - progressively worsening resp failure  - DM poorly controlled particularly PTA  - sarcopenic obesity (+ morbid obesity)    SUGGEST:  - if pt able to come off BiPAP for about 5 minutes, should place naso-enteric feeding tube (109 cm tube) and advance tip to beyond 2nd portion of the duodenum     - can then feed enterally into SB, by pump  - continue modified PPN via Midline     - Pepcid included in PN, and IV PPI d/c     - somewhat lower IVFE load, ordered SMOF-lipid, but still concerned with reliance of n6fa in any PPN (which is not the case with TPN)  - if can not place naso-duodenal tube by tomorrow, should place central line for TPN  - d/w RN and d/w medical resident.

## 2021-08-20 NOTE — PROGRESS NOTE ADULT - SUBJECTIVE AND OBJECTIVE BOX
Over Night Events: events noted, on BIPAP 100%    PHYSICAL EXAM    ICU Vital Signs Last 24 Hrs  T(C): 37.2 (19 Aug 2021 20:00), Max: 37.2 (19 Aug 2021 08:00)  T(F): 99 (19 Aug 2021 20:00), Max: 99 (19 Aug 2021 20:00)  HR: 84 (20 Aug 2021 06:00) (80 - 110)  BP: 180/81 (20 Aug 2021 06:00) (127/65 - 193/87)  BP(mean): 117 (20 Aug 2021 06:00) (87 - 125)  RR: 27 (20 Aug 2021 06:00) (19 - 45)  SpO2: 93% (20 Aug 2021 06:00) (84% - 100%)      General: ill looking  HEENT: SOCO             Lymph Nodes: No cervical LN   Lungs: Bilateral crackles  Cardiovascular: Regular   Abdomen: Soft, Positive BS  Extremities: No clubbing   Skin: Warm  Neurological: Non focal       08-18-21 @ 07:01  -  08-19-21 @ 07:00  --------------------------------------------------------  IN:    dextrose 5%: 400 mL    Fat Emulsion (Fish Oil &amp; Plant Based) 20% Infusion: 344 mL    PPN (Peripheral Parenteral Nutrition): 700 mL  Total IN: 1444 mL    OUT:    Voided (mL): 1000 mL  Total OUT: 1000 mL    Total NET: 444 mL      08-19-21 @ 07:01  -  08-20-21 @ 06:45  --------------------------------------------------------  IN:    Fat Emulsion (Fish Oil &amp; Plant Based) 20% Infusion: 219.1 mL    PPN (Peripheral Parenteral Nutrition): 910 mL  Total IN: 1129.1 mL    OUT:    Voided (mL): 700 mL  Total OUT: 700 mL    Total NET: 429.1 mL          LABS:                          12.2   14.11 )-----------( 422      ( 19 Aug 2021 05:05 )             38.4                                               08-20    139  |  97<L>  |  29<H>  ----------------------------<  223<H>  4.1   |  29  |  0.6<L>    Ca    8.6      20 Aug 2021 04:38  Phos  4.1     08-20  Mg     2.3     08-20    TPro  5.8<L>  /  Alb  3.5  /  TBili  0.9  /  DBili  x   /  AST  30  /  ALT  26  /  AlkPhos  74  08-20                                                                                           LIVER FUNCTIONS - ( 20 Aug 2021 04:38 )  Alb: 3.5 g/dL / Pro: 5.8 g/dL / ALK PHOS: 74 U/L / ALT: 26 U/L / AST: 30 U/L / GGT: x                                                                                                                                       MEDICATIONS  (STANDING):  aspirin  chewable 81 milliGRAM(s) Oral daily  dexAMETHasone  Injectable 6 milliGRAM(s) IV Push every 12 hours  enoxaparin Injectable 90 milliGRAM(s) SubCutaneous two times a day  fat emulsion (Fish Oil and Plant Based) 20% Infusion 0.8233 Gm/kG/Day (31.3 mL/Hr) IV Continuous <Continuous>  furosemide   Injectable 40 milliGRAM(s) IV Push daily  glucagon  Injectable 1 milliGRAM(s) IntraMuscular once  insulin glargine Injectable (LANTUS) 20 Unit(s) SubCutaneous every morning  insulin lispro (ADMELOG) corrective regimen sliding scale   SubCutaneous three times a day before meals  insulin lispro Injectable (ADMELOG) 5 Unit(s) SubCutaneous three times a day before meals  levothyroxine 137 MICROGram(s) Oral daily  losartan 100 milliGRAM(s) Oral daily  melatonin 5 milliGRAM(s) Oral at bedtime  montelukast 10 milliGRAM(s) Oral daily  Parenteral Nutrition - Adult 1 Each (70 mL/Hr) TPN Continuous <Continuous>  senna 2 Tablet(s) Oral at bedtime  silver sulfADIAZINE 1% Cream 1 Application(s) Topical three times a day    MEDICATIONS  (PRN):  acetaminophen   Tablet .. 650 milliGRAM(s) Oral every 6 hours PRN Temp greater or equal to 38C (100.4F), Mild Pain (1 - 3)  ALBUTerol    90 MICROgram(s) HFA Inhaler 2 Puff(s) Inhalation every 6 hours PRN Shortness of Breath and/or Wheezing  guaifenesin/dextromethorphan Oral Liquid 10 milliLiter(s) Oral every 6 hours PRN Cough  ondansetron Injectable 4 milliGRAM(s) IV Push every 6 hours PRN Nausea  polyethylene glycol 3350 17 Gram(s) Oral two times a day PRN Constipation

## 2021-08-20 NOTE — PROGRESS NOTE ADULT - ASSESSMENT
66 old female with history of hypothyroid, pre-diabetes, HTN, and asthma presents with shortness of breath starting this morning    # Shortness of breath secondary to severe COVID pneumonia+ superimposed bacterial infection  # suspected PE  - swabbed positive on Monday, shortness of breath started 8/07  - currently on bipap 18/10 24 100% satting 92  - [facility] LE /UE duplex 8/15 - no evidence of dvt  - CTA 8/14 -> could not r/o PE due to IV infiltration. recommended repeat test.   - Procal 8/18 - .83>.27>.07>.10>.09  - CRP 8/18 -204>42>51>7>21  - Ferritin 8/18 1426>2247>1574>1359>926  - D-dimer 8/20 137->227>4492>1265>651  - legionella and strep ag- negative   - s/p remdesivir 8/12  - s/p toci 800 mg   - s/p abx - rocephin + azithromycin 8/09-8/15  - continue  dexamethasone 6 mg BID   - continue therapeutic lovenox  - continue lasix  - continue on PPN   - FU procal, crp, ferritin  - f/u ID  - f/u pulm    # Hypothyroid  - c/w synthroid 137  - tsh - .08     # HTN  - takes losartan 100 / HCTZ 25 at home  - s/p labetalol IV push  - continue losartan 100 qd    # Asthma  - c/w albuterol as needed and singulair    # Hyponatremia- resolved  # Hypomag- resolved  - fu am     # DVT PPX: lovenox 90 bid  # GI PPX: pepcid BID  # Diet: PPN   # FULL CODE

## 2021-08-21 NOTE — PROGRESS NOTE ADULT - SUBJECTIVE AND OBJECTIVE BOX
SUBJECTIVE:    Patient is a 66y old Female who presents with a chief complaint of covid pneumonia     Currently admitted to medicine with the primary diagnosis of: COVID - 19    Today is hospital day 14d.     Overnight Events:     Patient is still on BIPAP 100% satting 99%.   no cp, abd pain, fever  sob unchanged, no cough, orthopnea, pnd    PAST MEDICAL & SURGICAL HISTORY  Type 2 diabetes mellitus without complication, without long-term current use of insulin    Essential hypertension    Acute gout, unspecified cause, unspecified site    Gastroesophageal reflux disease without esophagitis    Hypothyroidism, unspecified type    H/O abdominal hysterectomy    H/O thyroidectomy    ALLERGIES:  Milk (Unknown)  No Known Drug Allergies  Nuts (Unknown)  Seafood (Unknown)    MEDICATIONS:  MEDICATIONS  (STANDING):  aspirin  chewable 81 milliGRAM(s) Oral daily  dexAMETHasone  Injectable 6 milliGRAM(s) IV Push every 12 hours  enoxaparin Injectable 90 milliGRAM(s) SubCutaneous two times a day  fat emulsion (Fish Oil and Plant Based) 20% Infusion 0.8233 Gm/kG/Day (31.3 mL/Hr) IV Continuous <Continuous>  furosemide   Injectable 40 milliGRAM(s) IV Push daily  glucagon  Injectable 1 milliGRAM(s) IntraMuscular once  insulin glargine Injectable (LANTUS) 23 Unit(s) SubCutaneous at bedtime  insulin lispro (ADMELOG) corrective regimen sliding scale   SubCutaneous three times a day before meals  insulin lispro Injectable (ADMELOG) 6 Unit(s) SubCutaneous three times a day before meals  levothyroxine 137 MICROGram(s) Oral daily  losartan 100 milliGRAM(s) Oral daily  melatonin 5 milliGRAM(s) Oral at bedtime  montelukast 10 milliGRAM(s) Oral daily  Parenteral Nutrition - Adult 1 Each (70 mL/Hr) TPN Continuous <Continuous>  senna 2 Tablet(s) Oral at bedtime  silver sulfADIAZINE 1% Cream 1 Application(s) Topical three times a day    MEDICATIONS  (PRN):  acetaminophen   Tablet .. 650 milliGRAM(s) Oral every 6 hours PRN Temp greater or equal to 38C (100.4F), Mild Pain (1 - 3)  ALBUTerol    90 MICROgram(s) HFA Inhaler 2 Puff(s) Inhalation every 6 hours PRN Shortness of Breath and/or Wheezing  guaifenesin/dextromethorphan Oral Liquid 10 milliLiter(s) Oral every 6 hours PRN Cough  ondansetron Injectable 4 milliGRAM(s) IV Push every 6 hours PRN Nausea  polyethylene glycol 3350 17 Gram(s) Oral two times a day PRN Constipation      VITALS:     ICU Vital Signs Last 24 Hrs  T(C): 35.8 (21 Aug 2021 04:00), Max: 37 (20 Aug 2021 15:00)  T(F): 96.5 (21 Aug 2021 04:00), Max: 98.6 (20 Aug 2021 15:00)  HR: 84 (21 Aug 2021 07:00) (75 - 89)  BP: 154/78 (21 Aug 2021 07:00) (122/57 - 191/82)  BP(mean): 108 (21 Aug 2021 07:00) (80 - 118)  RR: 30 (21 Aug 2021 07:00) (18 - 34)  SpO2: 97% (21 Aug 2021 07:00) (82% - 99%)      LABS:                          11.9   15.45 )-----------( 303      ( 21 Aug 2021 06:04 )             38.5 08-21    140  |  100  |  25  ----------------------------<  192  4.0   |  28  |  0.5  Ca    8.6      21 Aug 2021 06:04Phos  3.7     08-21Mg     2.1     08-21  TPro  5.7  /  Alb  3.4  /  TBili  0.9  /  DBili  x   /  AST  32  /  ALT  27  /  AlkPhos  64  08-21    Procalcitonin, Serum: 0.03 (08-20)  Procalcitonin, Serum: 0.09 (08-18)  Procalcitonin, Serum: 0.10 (08-16)  Procalcitonin, Serum: 0.05 (08-15)  Procalcitonin, Serum: 0.07 (08-13)  Procalcitonin, Serum: 0.27 (08-11)  Procalcitonin, Serum: 0.83 (08-07)    C-Reactive Protein, Serum: 4 (08-20)  C-Reactive Protein, Serum: 21 (08-18)  C-Reactive Protein, Serum: 7 (08-16)  C-Reactive Protein, Serum: 11 (08-15)  C-Reactive Protein, Serum: 51 (08-13)  C-Reactive Protein, Serum: 42 (08-11)  C-Reactive Protein, Serum: 204 (08-07)    Ferritin, Serum: 926 (08-18)  Ferritin, Serum: 1359 (08-15)  Ferritin, Serum: 1574 (08-13)  Ferritin, Serum: 2247 (08-11)  Ferritin, Serum: 1426 (08-07)      D-Dimer Assay, Quantitative: 651 (08-20)  D-Dimer Assay, Quantitative: 1265 (08-18)  D-Dimer Assay, Quantitative: 4492 (08-16)  D-Dimer Assay, Quantitative: 2220 (08-15)  D-Dimer Assay, Quantitative: 882 (08-14)  D-Dimer Assay, Quantitative: 227 (08-11)  D-Dimer Assay, Quantitative: 137 (08-07)                          11.7   12.87 )-----------( 355      ( 20 Aug 2021 04:38 )             37.3 08-20    139  |  97  |  29  ----------------------------<  223  4.1   |  29  |  0.6  Ca    8.6      20 Aug 2021 04:38Phos  4.1     08-20Mg     2.3     08-20  TPro  5.8  /  Alb  3.5  /  TBili  0.9  /  DBili  x   /  AST  30  /  ALT  26  /  AlkPhos  74  08-20    Procalcitonin, Serum: 0.09 (08-18)  Procalcitonin, Serum: 0.10 (08-16)  Procalcitonin, Serum: 0.05 (08-15)  Procalcitonin, Serum: 0.07 (08-13)  Procalcitonin, Serum: 0.27 (08-11)  Procalcitonin, Serum: 0.83 (08-07)    C-Reactive Protein, Serum: 21 (08-18)  C-Reactive Protein, Serum: 7 (08-16)  C-Reactive Protein, Serum: 11 (08-15)  C-Reactive Protein, Serum: 51 (08-13)  C-Reactive Protein, Serum: 42 (08-11)  C-Reactive Protein, Serum: 204 (08-07)    Ferritin, Serum: 926 (08-18)  Ferritin, Serum: 1359 (08-15)  Ferritin, Serum: 1574 (08-13)  Ferritin, Serum: 2247 (08-11)  Ferritin, Serum: 1426 (08-07)      D-Dimer Assay, Quantitative: 651 (08-20)  D-Dimer Assay, Quantitative: 1265 (08-18)  D-Dimer Assay, Quantitative: 4492 (08-16)  D-Dimer Assay, Quantitative: 2220 (08-15)  D-Dimer Assay, Quantitative: 882 (08-14)  D-Dimer Assay, Quantitative: 227 (08-11)  D-Dimer Assay, Quantitative: 137 (08-07)          RADIOLOGY:    < from: Xray Chest 1 View- PORTABLE-Routine (Xray Chest 1 View- PORTABLE-Routine in AM.) (08.18.21 @ 05:58) >    Impression:    Stable bilateral lung opacities        ---End of Report ---    < end of copied text >      < from: Xray Chest 1 View- PORTABLE-Urgent (Xray Chest 1 View- PORTABLE-Urgent .) (08.16.21 @ 05:33) >  Impression:    No significant change in bilateral opacities, trace pleural effusions.    --- End of Report ---    < end of copied text >      < from: [facility] Duplex Upper Ext Vein Scan, Right (08.15.21 @ 17:55) >  Impression:    No evidence of deep or superficial thrombosis in the right upper extremity.    ICD-10:M79.89    < end of copied text >  < from: [facility] Duplex Lower Ext Vein Scan, Bilat (08.15.21 @ 17:57) >  Impression:    Noevidence of deep venous thrombosis or superficial thrombophlebitis in the bilateral lower extremities.    ICD-10:M79.89    < end of copied text >  < from: CT Angio Chest PE Protocol w/ IV Cont (08.14.21 @ 20:57) >  IMPRESSION:    Nondiagnostic CTA of the pulmonary arteries. Considerrepeat examination or nuclear medicine VQ scan as clinically indicated.    Constellation of pulmonary findings which can be seen with atypical viral pneumonia.    < end of copied text >      PHYSICAL EXAM:    GENERAL: NAD, lying. looks very tired  CHEST/LUNG: B/l coarse breath sounds  HEART: tachycardic. no murmurs  ABDOMEN: Bowel sounds present; Soft, Nontender, Nondistended.  EXTREMITIES: no edema  NERVOUS SYSTEM:  Alert & Oriented X3, speech clear. No deficits   MSK: FROM all 4 extremities, full and equal strength

## 2021-08-21 NOTE — PROGRESS NOTE ADULT - SUBJECTIVE AND OBJECTIVE BOX
Over Night Events: events noted, on bipap 100%, afebrile    PHYSICAL EXAM    ICU Vital Signs Last 24 Hrs  T(C): 35.8 (21 Aug 2021 04:00), Max: 37.1 (20 Aug 2021 08:00)  T(F): 96.5 (21 Aug 2021 04:00), Max: 98.8 (20 Aug 2021 08:00)  HR: 84 (21 Aug 2021 07:00) (75 - 89)  BP: 154/78 (21 Aug 2021 07:00) (122/57 - 191/82)  BP(mean): 108 (21 Aug 2021 07:00) (80 - 118)  RR: 30 (21 Aug 2021 07:00) (18 - 34)  SpO2: 97% (21 Aug 2021 07:00) (82% - 99%)      General: ill looking  HEENT: SOCO             Lymph Nodes: No cervical LN   Lungs: Bilateral craackles  Cardiovascular: Regular   Abdomen: Soft, Positive BS  Extremities: No clubbing   Skin: Warm  Neurological: Non focal       08-20-21 @ 07:01  -  08-21-21 @ 07:00  --------------------------------------------------------  IN:    Fat Emulsion (Fish Oil &amp; Plant Based) 20% Infusion: 219.1 mL    Fat Emulsion (Fish Oil &amp; Plant Based) 20% Infusion: 375.6 mL    PPN (Peripheral Parenteral Nutrition): 1680 mL  Total IN: 2274.7 mL    OUT:    Voided (mL): 1000 mL  Total OUT: 1000 mL    Total NET: 1274.7 mL          LABS:                          11.9   15.45 )-----------( 303      ( 21 Aug 2021 06:04 )             38.5                                               08-21    140  |  100  |  25<H>  ----------------------------<  192<H>  4.0   |  28  |  0.5<L>    Ca    8.6      21 Aug 2021 06:04  Phos  3.7     08-21  Mg     2.1     08-21    TPro  5.7<L>  /  Alb  3.4<L>  /  TBili  0.9  /  DBili  x   /  AST  32  /  ALT  27  /  AlkPhos  64  08-21                                                                                           LIVER FUNCTIONS - ( 21 Aug 2021 06:04 )  Alb: 3.4 g/dL / Pro: 5.7 g/dL / ALK PHOS: 64 U/L / ALT: 27 U/L / AST: 32 U/L / GGT: x                                                                                                                                       MEDICATIONS  (STANDING):  aspirin  chewable 81 milliGRAM(s) Oral daily  dexAMETHasone  Injectable 6 milliGRAM(s) IV Push every 12 hours  enoxaparin Injectable 90 milliGRAM(s) SubCutaneous two times a day  fat emulsion (Fish Oil and Plant Based) 20% Infusion 0.8233 Gm/kG/Day (31.3 mL/Hr) IV Continuous <Continuous>  furosemide   Injectable 40 milliGRAM(s) IV Push daily  glucagon  Injectable 1 milliGRAM(s) IntraMuscular once  insulin glargine Injectable (LANTUS) 23 Unit(s) SubCutaneous at bedtime  insulin lispro (ADMELOG) corrective regimen sliding scale   SubCutaneous three times a day before meals  insulin lispro Injectable (ADMELOG) 6 Unit(s) SubCutaneous three times a day before meals  levothyroxine 137 MICROGram(s) Oral daily  losartan 100 milliGRAM(s) Oral daily  melatonin 5 milliGRAM(s) Oral at bedtime  montelukast 10 milliGRAM(s) Oral daily  Parenteral Nutrition - Adult 1 Each (70 mL/Hr) TPN Continuous <Continuous>  senna 2 Tablet(s) Oral at bedtime  silver sulfADIAZINE 1% Cream 1 Application(s) Topical three times a day    MEDICATIONS  (PRN):  acetaminophen   Tablet .. 650 milliGRAM(s) Oral every 6 hours PRN Temp greater or equal to 38C (100.4F), Mild Pain (1 - 3)  ALBUTerol    90 MICROgram(s) HFA Inhaler 2 Puff(s) Inhalation every 6 hours PRN Shortness of Breath and/or Wheezing  guaifenesin/dextromethorphan Oral Liquid 10 milliLiter(s) Oral every 6 hours PRN Cough  ondansetron Injectable 4 milliGRAM(s) IV Push every 6 hours PRN Nausea  polyethylene glycol 3350 17 Gram(s) Oral two times a day PRN Constipation      Xrays:                                                                                     ECHO

## 2021-08-21 NOTE — PROGRESS NOTE ADULT - ASSESSMENT
IMPRESSION:    Acute hypoxemic resp failure on bipap 100%  Severe covid pneumonia  Hyponatremia-resolved  Doubt bacterial superinfection    PLAN:    CNS: Avoid CNS depressant    HEENT:  Oral care    PULMONARY:  HOB @ 45 degrees, BiPAP/ HHFNC as tolerated.  Wean O2 as tolerated. keep Decadron to 6 q 12h    CARDIOVASCULAR: Avoid volume overload     GI: Protonix BID,  Feeding.  Bowel regimen    RENAL:  F/u  lytes.  Correct as needed. accurate I/O,     INFECTIOUS DISEASE:  trend markers, abx per ID    HEMATOLOGICAL:   therapeutic LMWH    ENDOCRINE:  Follow up FS.  Insulin protocol if needed.    CODE STATUS: FULL CODE    DISPOSITION: MICU    poor prognosis

## 2021-08-21 NOTE — PROGRESS NOTE ADULT - ASSESSMENT
66 old female with history of hypothyroid, pre-diabetes, HTN, and asthma presents with shortness of breath starting this morning    # Shortness of breath secondary to severe COVID pneumonia+ superimposed bacterial infection  # suspected PE  - swabbed positive on Monday, shortness of breath started 8/07  - currently on bipap 18/10 24 100% satting 99  - [facility] LE /UE duplex 8/15 - no evidence of dvt  - CTA 8/14 -> could not r/o PE due to IV infiltration.  - Procal 8/20 - .83>.27>.07>.10>.09>.03  - CRP 8/20 -204>42>51>7>21>4  - Ferritin 8/18 1426>2247>1574>1359>926  - D-dimer 8/20 137->227>4492>1265>651  - legionella and strep ag- negative   - s/p remdesivir 8/12  - s/p toci 800 mg   - s/p abx - rocephin + azithromycin 8/09-8/15  - continue  dexamethasone 6 mg BID   - continue therapeutic lovenox  - continue lasix  - continue on PPN   - WBC increasing but procal neg unlikely superimposed bacterial infection. will monitor  - FU ferritin  - f/u ID  - f/u pulm    # Hypothyroid  - c/w synthroid 137  - tsh - .08     # HTN  - takes losartan 100 / HCTZ 25 at home  - s/p labetalol IV push  - continue losartan 100 qd    # Asthma  - c/w albuterol as needed and singulair    # Hyponatremia- resolved  # Hypomag- resolved  - fu am     # DVT PPX: lovenox 90 bid  # GI PPX: pepcid BID  # Diet: PPN   # FULL CODE

## 2021-08-22 NOTE — PROGRESS NOTE ADULT - ASSESSMENT
66 old female with history of hypothyroid, pre-diabetes, HTN, and asthma presents with shortness of breath starting this morning    # Shortness of breath secondary to severe COVID pneumonia+ superimposed bacterial infection  # suspected PE    - swabbed positive on Monday, shortness of breath started 8/07  - currently on bipap 18/10 24 100% satting 99  - Va LE /UE duplex 8/15 - no evidence of dvt  - CTA 8/14 -> could not r/o PE due to IV infiltration.  - Procal 8/20 - .83>.27>.07>.10>.09>.03  - CRP 8/20 -204>42>51>7>21>4  - Ferritin 8/18 1426>2247>1574>1359>926  - D-dimer 8/20 137->227>4492>1265>651  - legionella and strep ag- negative   - s/p remdesivir 8/12  - s/p toci 800 mg   - s/p abx - rocephin + azithromycin 8/09-8/15  - continue  dexamethasone 6 mg BID   - continue therapeutic lovenox  - continue lasix  - continue on PPN   - WBC increasing but procal neg unlikely superimposed bacterial infection. will monitor        # Hypothyroid    - c/w synthroid 137  - tsh - .08     # HTN    - takes losartan 100 / HCTZ 25 at home  - s/p labetalol IV push  - continue losartan 100 qd    # Asthma    - c/w albuterol as needed and singulair      # DVT PPX: lovenox 90 bid  # GI PPX: protonix  # Diet: PPN   # FULL CODE

## 2021-08-22 NOTE — PHYSICAL THERAPY INITIAL EVALUATION ADULT - ACTIVE RANGE OF MOTION EXAMINATION, REHAB EVAL
bilat full AROM limited by the respiratory status, desaturation with any considerable effort/deficits as listed below

## 2021-08-22 NOTE — PROGRESS NOTE ADULT - ASSESSMENT
IMPRESSION:    Acute hypoxemic resp failure on bipap 100%/ HHFNC 60/100  Severe covid pneumonia  Doubt bacterial superinfection  Possible GAVIN    PLAN:    CNS: Avoid CNS depressant    HEENT:  Oral care    PULMONARY:  HOB @ 45 degrees, BiPAP/ HHFNC as tolerated.  Wean O2 as tolerated. DEC Decadron to 6 q 24    CARDIOVASCULAR: Avoid volume overload     GI: Protonix BID, ppn    RENAL:  F/u  lytes.  Correct as needed. accurate I/O,     INFECTIOUS DISEASE:  trend markers, abx per ID    HEMATOLOGICAL:   therapeutic LMWH    ENDOCRINE:  Follow up FS.  Insulin protocol if needed.    CODE STATUS: FULL CODE    DISPOSITION: MICU    poor prognosis

## 2021-08-22 NOTE — PROGRESS NOTE ADULT - REASON FOR ADMISSION
covid pneumonia

## 2021-08-22 NOTE — PROGRESS NOTE ADULT - PROVIDER SPECIALTY LIST ADULT
Critical Care
Critical Care
Hospitalist
Internal Medicine
Pulmonology
Critical Care
Critical Care
Internal Medicine
Pulmonology
Pulmonology
Internal Medicine
Nutrition Support
Pulmonology

## 2021-08-22 NOTE — PROGRESS NOTE ADULT - SUBJECTIVE AND OBJECTIVE BOX
24H events:    Patient is a 66y old Female who presents with a chief complaint of covid pneumonia (22 Aug 2021 07:36)    Primary diagnosis of COVID-19       Today is hospital day 15d. This morning patient was seen and examined at bedside, resting comfortably in bed.    alternating between HFNC 100/60 and Bipap overnight  stable clinically  had bowel movement yesterday  no major event overnight    PAST MEDICAL & SURGICAL HISTORY  Type 2 diabetes mellitus without complication, without long-term current use of insulin    Essential hypertension    Acute gout, unspecified cause, unspecified site    Gastroesophageal reflux disease without esophagitis    Hypothyroidism, unspecified type    H/O abdominal hysterectomy    H/O thyroidectomy      SOCIAL HISTORY:  Social History:  no smoking, no alcohol, no illicit drug abuse (08 Aug 2021 03:04)      ALLERGIES:  Milk (Unknown)  No Known Drug Allergies  Nuts (Unknown)  Seafood (Unknown)    MEDICATIONS:  STANDING MEDICATIONS  aspirin  chewable 81 milliGRAM(s) Oral daily  dexAMETHasone  Injectable 6 milliGRAM(s) IV Push every 12 hours  enoxaparin Injectable 90 milliGRAM(s) SubCutaneous two times a day  fat emulsion (Fish Oil and Plant Based) 20% Infusion 0.8233 Gm/kG/Day IV Continuous <Continuous>  glucagon  Injectable 1 milliGRAM(s) IntraMuscular once  insulin glargine Injectable (LANTUS) 24 Unit(s) SubCutaneous at bedtime  insulin lispro (ADMELOG) corrective regimen sliding scale   SubCutaneous three times a day before meals  insulin lispro Injectable (ADMELOG) 8 Unit(s) SubCutaneous three times a day before meals  levothyroxine 137 MICROGram(s) Oral daily  losartan 100 milliGRAM(s) Oral daily  melatonin 5 milliGRAM(s) Oral at bedtime  montelukast 10 milliGRAM(s) Oral daily  pantoprazole  Injectable 40 milliGRAM(s) IV Push every 12 hours  Parenteral Nutrition - Adult 1 Each TPN Continuous <Continuous>  senna 2 Tablet(s) Oral at bedtime  silver sulfADIAZINE 1% Cream 1 Application(s) Topical three times a day    PRN MEDICATIONS  acetaminophen   Tablet .. 650 milliGRAM(s) Oral every 6 hours PRN  ALBUTerol    90 MICROgram(s) HFA Inhaler 2 Puff(s) Inhalation every 6 hours PRN  guaifenesin/dextromethorphan Oral Liquid 10 milliLiter(s) Oral every 6 hours PRN  ondansetron Injectable 4 milliGRAM(s) IV Push every 6 hours PRN  polyethylene glycol 3350 17 Gram(s) Oral two times a day PRN    VITALS:   T(F): 98  HR: 80  BP: 141/65  RR: 20  SpO2: 99%    PHYSICAL EXAM:  GENERAL: NAD  NERVOUS SYSTEM:  Alert & Oriented X3, non focal   CHEST/LUNG: bilateral crackles  HEART: Regular rate and rhythm; No murmurs, rubs, or gallops  ABDOMEN: Soft, Nontender, Nondistended; Bowel sounds present  EXTREMITIES:   No clubbing, cyanosis, or edema    LABS:                        11.8   14.95 )-----------( 299      ( 22 Aug 2021 05:40 )             36.8     08-22    138  |  98  |  25<H>  ----------------------------<  185<H>  4.1   |  27  |  0.5<L>    Ca    8.6      22 Aug 2021 05:40  Phos  4.5     08-22  Mg     2.1     08-22    TPro  5.6<L>  /  Alb  3.6  /  TBili  1.0  /  DBili  x   /  AST  29  /  ALT  33  /  AlkPhos  63  08-22                  RADIOLOGY:

## 2021-08-22 NOTE — PHYSICAL THERAPY INITIAL EVALUATION ADULT - GENERAL OBSERVATIONS, REHAB EVAL
[id number] pm. 67 y/o F received/left in bed, HOB 30 deg, + 60/100 NCHF, tele, IV, primafit, RT forearm drsg. pt is A+Ox3, pleasant, follows VC's. participation limited at this time due to pt quickly desaturating with movement, speaking and coughing. per RN and RT, pt is gradually improving. pt participated in limited AAROM bilat UE/LE with frequent rest breaks and PLB exercises. during session required the NRB x 10 min (in addition to the NCHF) per RT - due to desaturation to 81% with coughing; O2 sats improved to 91% within 1-2 min. O2 sats fluctuated between 81 and 96%. /76 HR 91. bed mobility NA at this time due to the respiratory status.

## 2021-08-22 NOTE — PROGRESS NOTE ADULT - SUBJECTIVE AND OBJECTIVE BOX
Over Night Events: events noted, overnight on BIPAP 100%, afebrile    PHYSICAL EXAM    ICU Vital Signs Last 24 Hrs  T(C): 36.7 (21 Aug 2021 20:00), Max: 36.7 (21 Aug 2021 20:00)  T(F): 98 (21 Aug 2021 20:00), Max: 98 (21 Aug 2021 20:00)  HR: 80 (22 Aug 2021 07:00) (77 - 105)  BP: 141/65 (22 Aug 2021 07:00) (128/59 - 199/90)  BP(mean): 93 (22 Aug 2021 07:00) (85 - 129)  RR: 20 (22 Aug 2021 07:00) (17 - 30)  SpO2: 99% (22 Aug 2021 07:00) (93% - 100%)      General: ill looking  HEENT: SOCO             Lymph Nodes: No cervical LN   Lungs: Bilateral rhonchi  Cardiovascular: Regular   Abdomen: Soft, Positive BS  Extremities: No clubbing   Skin: Warm  Neurological: Non focal       08-21-21 @ 07:01  -  08-22-21 @ 07:00  --------------------------------------------------------  IN:    Fat Emulsion (Fish Oil &amp; Plant Based) 20% Infusion: 219.1 mL    Fat Emulsion (Fish Oil &amp; Plant Based) 20% Infusion: 375 mL    PPN (Peripheral Parenteral Nutrition): 1680 mL  Total IN: 2274.1 mL    OUT:    Voided (mL): 1400 mL  Total OUT: 1400 mL    Total NET: 874.1 mL          LABS:                          11.8   14.95 )-----------( 299      ( 22 Aug 2021 05:40 )             36.8                                               08-22    138  |  98  |  25<H>  ----------------------------<  185<H>  4.1   |  27  |  0.5<L>    Ca    8.6      22 Aug 2021 05:40  Phos  4.5     08-22  Mg     2.1     08-22    TPro  5.6<L>  /  Alb  3.6  /  TBili  1.0  /  DBili  x   /  AST  29  /  ALT  33  /  AlkPhos  63  08-22                                                                                           LIVER FUNCTIONS - ( 22 Aug 2021 05:40 )  Alb: 3.6 g/dL / Pro: 5.6 g/dL / ALK PHOS: 63 U/L / ALT: 33 U/L / AST: 29 U/L / GGT: x                                                                                                                                       MEDICATIONS  (STANDING):  aspirin  chewable 81 milliGRAM(s) Oral daily  dexAMETHasone  Injectable 6 milliGRAM(s) IV Push every 12 hours  enoxaparin Injectable 90 milliGRAM(s) SubCutaneous two times a day  fat emulsion (Fish Oil and Plant Based) 20% Infusion 0.8233 Gm/kG/Day (31.3 mL/Hr) IV Continuous <Continuous>  glucagon  Injectable 1 milliGRAM(s) IntraMuscular once  insulin glargine Injectable (LANTUS) 24 Unit(s) SubCutaneous at bedtime  insulin lispro (ADMELOG) corrective regimen sliding scale   SubCutaneous three times a day before meals  insulin lispro Injectable (ADMELOG) 8 Unit(s) SubCutaneous three times a day before meals  levothyroxine 137 MICROGram(s) Oral daily  losartan 100 milliGRAM(s) Oral daily  melatonin 5 milliGRAM(s) Oral at bedtime  montelukast 10 milliGRAM(s) Oral daily  Parenteral Nutrition - Adult 1 Each (70 mL/Hr) TPN Continuous <Continuous>  senna 2 Tablet(s) Oral at bedtime  silver sulfADIAZINE 1% Cream 1 Application(s) Topical three times a day    MEDICATIONS  (PRN):  acetaminophen   Tablet .. 650 milliGRAM(s) Oral every 6 hours PRN Temp greater or equal to 38C (100.4F), Mild Pain (1 - 3)  ALBUTerol    90 MICROgram(s) HFA Inhaler 2 Puff(s) Inhalation every 6 hours PRN Shortness of Breath and/or Wheezing  guaifenesin/dextromethorphan Oral Liquid 10 milliLiter(s) Oral every 6 hours PRN Cough  ondansetron Injectable 4 milliGRAM(s) IV Push every 6 hours PRN Nausea  polyethylene glycol 3350 17 Gram(s) Oral two times a day PRN Constipation

## 2021-08-23 NOTE — DISCHARGE NOTE FOR THE EXPIRED PATIENT - NS PATIENT DEATH CRITERIA
Patient is dead based on Cardiopulmonary criteria including absent breath sounds, pulselessness and/or asystole Hyperparathyroidism  11/08/2018    Active  Ruby Ramsay

## 2021-08-23 NOTE — DISCHARGE NOTE FOR THE EXPIRED PATIENT - HOSPITAL COURSE
66 old female with history of hypothyroid, pre-diabetes, HTN, and asthma presented with SOB, found to have COVID-19, admitted for acute hypoxic respiratory failure. She was suspected to have PE however CTA was nondiagnostic, she was started on therapeutic Lovenox. On 8/22 night, the patient was found to be hypotensive, she received IVF and responded. She was suspected to have an intraabdominal bleed; while transporting to get CTAP,  66 old female with history of hypothyroid, pre-diabetes, HTN, and asthma presented with SOB, found to have COVID-19, admitted for acute hypoxic respiratory failure. She was suspected to have PE however CTA was nondiagnostic, she was started on therapeutic Lovenox. On 8/22 night, the patient was found to be hypotensive and tachycardic, she received IVF and responded. She was complaining of abdominal pain and was suspected to have an intraabdominal bleed; while transporting to get CTAP, she became hypoxic and hypotensive and was obtunded and non-responsive to sternal rub. RR was called and the patient was intubated. She then became pulseless and CPR was started  66 old female with history of hypothyroid, pre-diabetes, HTN, and asthma presented with SOB, found to have COVID-19, admitted for acute hypoxic respiratory failure. She was suspected to have PE however CTA was nondiagnostic, she was started on therapeutic Lovenox. On 8/22 night, the patient was found to be hypotensive and tachycardic, she received IVF and responded. She was complaining of abdominal pain and was suspected to have an intraabdominal bleed; while transporting to get CTAP, she became hypoxic and hypotensive and was obtunded and non-responsive to sternal rub. RR was called and the patient was intubated. She then became pulseless and CPR was started. She achieved ROSC then later became pulseless again, CPR restarted but was unsuccessful. Time of deat 4: 28 AM on 8/23/2021.

## 2021-09-02 DIAGNOSIS — Z79.84 LONG TERM (CURRENT) USE OF ORAL HYPOGLYCEMIC DRUGS: ICD-10-CM

## 2021-09-02 DIAGNOSIS — J45.909 UNSPECIFIED ASTHMA, UNCOMPLICATED: ICD-10-CM

## 2021-09-02 DIAGNOSIS — U07.1 COVID-19: ICD-10-CM

## 2021-09-02 DIAGNOSIS — I46.9 CARDIAC ARREST, CAUSE UNSPECIFIED: ICD-10-CM

## 2021-09-02 DIAGNOSIS — S50.821A BLISTER (NONTHERMAL) OF RIGHT FOREARM, INITIAL ENCOUNTER: ICD-10-CM

## 2021-09-02 DIAGNOSIS — E87.6 HYPOKALEMIA: ICD-10-CM

## 2021-09-02 DIAGNOSIS — K25.9 GASTRIC ULCER, UNSPECIFIED AS ACUTE OR CHRONIC, WITHOUT HEMORRHAGE OR PERFORATION: ICD-10-CM

## 2021-09-02 DIAGNOSIS — K21.9 GASTRO-ESOPHAGEAL REFLUX DISEASE WITHOUT ESOPHAGITIS: ICD-10-CM

## 2021-09-02 DIAGNOSIS — R58 HEMORRHAGE, NOT ELSEWHERE CLASSIFIED: ICD-10-CM

## 2021-09-02 DIAGNOSIS — J12.82 PNEUMONIA DUE TO CORONAVIRUS DISEASE 2019: ICD-10-CM

## 2021-09-02 DIAGNOSIS — E11.9 TYPE 2 DIABETES MELLITUS WITHOUT COMPLICATIONS: ICD-10-CM

## 2021-09-02 DIAGNOSIS — Z79.82 LONG TERM (CURRENT) USE OF ASPIRIN: ICD-10-CM

## 2021-09-02 DIAGNOSIS — I95.9 HYPOTENSION, UNSPECIFIED: ICD-10-CM

## 2021-09-02 DIAGNOSIS — I10 ESSENTIAL (PRIMARY) HYPERTENSION: ICD-10-CM

## 2021-09-02 DIAGNOSIS — E03.9 HYPOTHYROIDISM, UNSPECIFIED: ICD-10-CM

## 2021-09-02 DIAGNOSIS — E83.42 HYPOMAGNESEMIA: ICD-10-CM
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
## 2021-09-02 DIAGNOSIS — E66.01 MORBID (SEVERE) OBESITY DUE TO EXCESS CALORIES: ICD-10-CM

## 2021-09-02 DIAGNOSIS — R74.01 ELEVATION OF LEVELS OF LIVER TRANSAMINASE LEVELS: ICD-10-CM

## 2021-09-02 DIAGNOSIS — Z90.710 ACQUIRED ABSENCE OF BOTH CERVIX AND UTERUS: ICD-10-CM

## 2021-09-02 DIAGNOSIS — A49.9 BACTERIAL INFECTION, UNSPECIFIED: ICD-10-CM

## 2021-09-02 DIAGNOSIS — E87.1 HYPO-OSMOLALITY AND HYPONATREMIA: ICD-10-CM

## 2021-09-02 DIAGNOSIS — I26.99 OTHER PULMONARY EMBOLISM WITHOUT ACUTE COR PULMONALE: ICD-10-CM

## 2021-09-02 DIAGNOSIS — J96.01 ACUTE RESPIRATORY FAILURE WITH HYPOXIA: ICD-10-CM
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
